# Patient Record
Sex: MALE | Race: WHITE | NOT HISPANIC OR LATINO | Employment: OTHER | URBAN - METROPOLITAN AREA
[De-identification: names, ages, dates, MRNs, and addresses within clinical notes are randomized per-mention and may not be internally consistent; named-entity substitution may affect disease eponyms.]

---

## 2017-03-20 ENCOUNTER — HOSPITAL ENCOUNTER (OUTPATIENT)
Dept: RADIOLOGY | Facility: MEDICAL CENTER | Age: 47
End: 2017-03-20
Attending: NURSE PRACTITIONER
Payer: COMMERCIAL

## 2017-03-20 DIAGNOSIS — Z80.51 FAMILY HISTORY OF MALIGNANT NEOPLASM OF KIDNEY: ICD-10-CM

## 2017-03-20 DIAGNOSIS — R31.29 MICROSCOPIC HEMATURIA: ICD-10-CM

## 2017-03-20 PROCEDURE — 76775 US EXAM ABDO BACK WALL LIM: CPT

## 2017-05-15 ENCOUNTER — HOSPITAL ENCOUNTER (OUTPATIENT)
Dept: RADIOLOGY | Facility: MEDICAL CENTER | Age: 47
End: 2017-05-15
Attending: NURSE PRACTITIONER
Payer: COMMERCIAL

## 2017-05-15 DIAGNOSIS — K76.0 FATTY METAMORPHOSIS OF LIVER: ICD-10-CM

## 2017-05-15 PROCEDURE — 76705 ECHO EXAM OF ABDOMEN: CPT

## 2017-05-22 ENCOUNTER — OFFICE VISIT (OUTPATIENT)
Dept: PULMONOLOGY | Facility: HOSPICE | Age: 47
End: 2017-05-22
Payer: COMMERCIAL

## 2017-05-22 VITALS
HEART RATE: 71 BPM | BODY MASS INDEX: 34.87 KG/M2 | SYSTOLIC BLOOD PRESSURE: 122 MMHG | RESPIRATION RATE: 16 BRPM | OXYGEN SATURATION: 96 % | DIASTOLIC BLOOD PRESSURE: 86 MMHG | HEIGHT: 66 IN | WEIGHT: 217 LBS | TEMPERATURE: 98.8 F

## 2017-05-22 DIAGNOSIS — I10 ESSENTIAL HYPERTENSION: ICD-10-CM

## 2017-05-22 DIAGNOSIS — J45.20 MILD INTERMITTENT ASTHMA WITHOUT COMPLICATION: ICD-10-CM

## 2017-05-22 DIAGNOSIS — G47.33 OSA (OBSTRUCTIVE SLEEP APNEA): ICD-10-CM

## 2017-05-22 PROCEDURE — 99214 OFFICE O/P EST MOD 30 MIN: CPT | Performed by: NURSE PRACTITIONER

## 2017-05-22 NOTE — PROGRESS NOTES
Chief Complaint   Patient presents with   • Apnea   • Asthma       HPI:  Zuhair Jj is a 46 y.o. year old male here today for follow-up on his obstructive sleep apnea and mild intermittent Asthma. His prior PSG indicated an AHI of 15.5 with a low oxygen saturation. He has been compliant on CPAP of 12 CM H20. Compliance card download indicates an average use of 5-6 hours at night. However, his machine does not estimate AHI capability. Overnight oximetry 1/10/2017 on his current pressures indicate a mean 02 saturation of 96.6%. He did have 1 episode of desaturation. However, it appears his mask may have been off at that time. He tolerates the CPAP pressure well. He sleeps better on therapy and wakes more refreshed. He denies any morning headaches. He has a full face mask which is comfortable.    He has a history of mild Asthma and seasonal Allergies. He had been on Qvar in the past.  However, he is off all inhalers at this time. PFT's at his last office visit 11/17/2016 indicated an FEV1 of 3.17 L, 89% predicted with an FEV1/FVC ratio of 81 with a DLCO of 139% predicted. PFT's in August 2015 indicated an FEV1 of 3.45 L, 95% predicted with an FEV1/FVC ratio of 83 and a DLCO of 145% predicted. He denies current dyspnea. He notes an occasional cough. He feels this is often related to allergies and post nasal drip. He denies current mucous production. He notes a seldom wheeze usually with exercise. He denies any fevers or chills. He denies any recent respiratory infections. He has an Albuterol inhaler, but has not required use of it months.       Past Medical History   Diagnosis Date   • Hypertension    • Asthma    • Sleep apnea    • Chest pain    • Overweight        History reviewed. No pertinent past surgical history.    Family History   Problem Relation Age of Onset   • Cancer Father    • Heart Failure Mother        Social History     Social History   • Marital Status:      Spouse Name: N/A   • Number of  "Children: N/A   • Years of Education: N/A     Occupational History   • Not on file.     Social History Main Topics   • Smoking status: Not on file   • Smokeless tobacco: Not on file   • Alcohol Use: Not on file   • Drug Use: Not on file   • Sexual Activity: Not on file     Other Topics Concern   • Not on file     Social History Narrative         ROS:  Constitutional: Denies fevers, chills, sweats, fatigue, weight loss  Eyes: Denies vision loss, pain, drainage, double vision. Wears glasses  Ears/Nose/Mouth/Throat: Denies ear ache, difficulty hearing, sore throat, persistent hoarseness, decayed teeth/toothache. Positive rhinitis   Cardiovascular: Denies chest pain, tightness, palpitations, swelling in feet/legs, fainting, difficulty breathing when laying down  Respiratory: See HPI   GI: Denies heartburn, difficulty swallowing, nausea, vomiting, abdominal pain, diarrhea, constipation  : Denies frequent urination, painful urination  Integumentary: Denies rashes, lumps or color changes  MSK: Denies painful joints, sore muscles, and back pain.   Neurological: Denies frequent headaches, dizziness, weakness  Sleep: See HPI       Current Outpatient Prescriptions on File Prior to Visit   Medication Sig Dispense Refill   • lisinopril-hydrochlorothiazide (PRINZIDE, ZESTORETIC) 20-25 MG per tablet      • metformin ER (GLUCOPHAGE XR) 750 MG TABLET SR 24 HR        No current facility-administered medications on file prior to visit.     Pcn    Blood pressure 122/86, pulse 71, temperature 37.1 °C (98.8 °F), resp. rate 16, height 1.676 m (5' 5.98\"), weight 98.431 kg (217 lb), SpO2 96 %.  PE:   Appearance: Well developed, well nourished, no acute distress  Eyes: PERRL, EOM intact, sclera white, conjunctiva moist  Ears: no lesions or deformities  Hearing: grossly intact  Nose: no lesions or deformities  Oropharynx: tongue normal, posterior pharynx without erythema or exudate  Mallampati Classification: class 4   Neck: supple, trachea " midline, no masses   Respiratory effort: no intercostal retractions or use of accessory muscles  Lung auscultation: no rales, rhonchi or wheezes  Heart auscultation: no murmur rub or gallop  Extremities: no cyanosis or edema  Abdomen: soft ,non tender, no masses  Gait and Station: normal  Digits and nails: no clubbing, cyanosis, petechiae or nodes.  Cranial nerves: grossly intact  Skin: no rashes, lesions or ulcers noted  Orientation: Oriented to time, person and place  Mood and affect: mood and affect appropriate, normal interaction with examiner  Judgement: Intact          Assessment:  1. MAGDALENA (obstructive sleep apnea)     2. Mild intermittent asthma without complication     3. Essential hypertension           Plan:    1) Continue CPAP at 12 CM H20. Order for new machine with AHI capability if eligible.   2) Sleep hygiene discussed.  3) Continue Albuterol HFA inhaler as needed. Stepwise treatment approach to Asthma discussed. Currently his symptoms are stable. He will remain off Qvar.   4) Encouraged routine walking/exercise.  5) Recommend updated Influenza vaccine in the Fall.   6) Follow up in 6 months, sooner if needed.

## 2017-05-22 NOTE — MR AVS SNAPSHOT
"        Zuhair Jj   2017 11:20 AM   Office Visit   MRN: [id number]    Department:  [facility]   Dept Phone:  [phone]    Description:  Male : 1970   Provider:  KENDALL Wright           Reason for Visit     Apnea     Asthma           Allergies as of 2017     Allergen Noted Reactions    Pcn [Penicillins] 2016         You were diagnosed with     MAGDALENA (obstructive sleep apnea)   [964068]       Mild intermittent asthma without complication   [653329]       Essential hypertension   [4274131]         Vital Signs     Blood Pressure Pulse Temperature Respirations Height Weight    122/86 mmHg 71 37.1 °C (98.8 °F) 16 1.676 m (5' 5.98\") 98.431 kg (217 lb)    Body Mass Index Oxygen Saturation                35.04 kg/m2 96%          Basic Information     Date Of Birth Sex Race Ethnicity Preferred Language    1970 Male White Unknown English      Your appointments     2017  1:20 PM   Established Patient Pul with KENDALL Wright   [facility] (--)    [address] [facility] [address]  [facility] [id number] [phone]              Problem List              ICD-10-CM Priority Class Noted - Resolved    Mild intermittent asthma without complication J45.20   10/6/2016 - Present    MAGDALENA (obstructive sleep apnea) G47.33   10/6/2016 - Present    Essential hypertension I10   10/6/2016 - Present      Health Maintenance        Date Due Completion Dates    IMM DTaP/Tdap/Td Vaccine (1 - Tdap) 1987 ---    IMM PNEUMOCOCCAL 19-64 (ADULT) MEDIUM RISK SERIES (1 of 1 - PPSV23) 1987 ---            Current Immunizations     Influenza TIV (IM) 10/2/2013    Influenza Vaccine Quad Inj (Preserved) 10/6/2016  3:32 PM      Below and/or attached are the medications your provider expects you to take. Review all of your home medications and newly ordered medications with your provider and/or pharmacist. Follow medication instructions as directed by your " provider and/or pharmacist. Please keep your medication list with you and share with your provider. Update the information when medications are discontinued, doses are changed, or new medications (including over-the-counter products) are added; and carry medication information at all times in the event of emergency situations     Allergies:  PCN - (reactions not documented)               Medications  Valid as of: May 22, 2017 - 12:16 PM    Generic Name Brand Name Tablet Size Instructions for use    Lisinopril-Hydrochlorothiazide (Tab) PRINZIDE, ZESTORETIC 20-25 MG         MetFORMIN HCl (TABLET SR 24 HR) GLUCOPHAGE  MG         .                 Medicines prescribed today were sent to:     [facility][id number] - [place], [place] - [address]    [address] [place] [address]    Phone: [phone] Fax: [phone]    Open 24 Hours?: No      Medication refill instructions:       If your prescription bottle indicates you have medication refills left, it is not necessary to call your provider’s office. Please contact your pharmacy and they will refill your medication.    If your prescription bottle indicates you do not have any refills left, you may request refills at any time through one of the following ways: The online [employer] system (except Urgent Care), by calling your provider’s office, or by asking your pharmacy to contact your provider’s office with a refill request. Medication refills are processed only during regular business hours and may not be available until the next business day. Your provider may request additional information or to have a follow-up visit with you prior to refilling your medication.   *Please Note: Medication refills are assigned a new Rx number when refilled electronically. Your pharmacy may indicate that no refills were authorized even though a new prescription for the same medication is available at the pharmacy. Please request the medicine by name with the pharmacy before  contacting your provider for a refill.           MegaPath Access Code: [email or web address]  Expires: 6/15/2017  4:36 PM    MegaPath  A secure, online tool to manage your health information     [employer]’s MegaPath® is a secure, online tool that connects you to your personalized health information from the privacy of your home -- day or night - making it very easy for you to manage your healthcare. Once the activation process is completed, you can even access your medical information using the MegaPath maile, which is available for free in the Apple Maile store or Google Play store.     MegaPath provides the following levels of access (as shown below):   My Chart Features   [facility] Primary Care Doctor [facility]  Specialists [facility]  Urgent  Care Non-[facility]  Primary Care  Doctor   Email your healthcare team securely and privately 24/7 X X X    Manage appointments: schedule your next appointment; view details of past/upcoming appointments X      Request prescription refills. X      View recent personal medical records, including lab and immunizations X X X X   View health record, including health history, allergies, medications X X X X   Read reports about your outpatient visits, procedures, consult and ER notes X X X X   See your discharge summary, which is a recap of your hospital and/or ER visit that includes your diagnosis, lab results, and care plan. X X       How to register for MegaPath:  1. Go to  [email or web address].  2. Click on the Sign Up Now box, which takes you to the New Member Sign Up page. You will need to provide the following information:  a. Enter your MegaPath Access Code exactly as it appears at the top of this page. (You will not need to use this code after you’ve completed the sign-up process. If you do not sign up before the expiration date, you must request a new code.)   b. Enter your date of birth.   c. Enter your home email address.   d. Click Submit, and follow the next screen’s  instructions.  3. Create a [employer] ID. This will be your [employer] login ID and cannot be changed, so think of one that is secure and easy to remember.  4. Create a [employer] password. You can change your password at any time.  5. Enter your Password Reset Question and Answer. This can be used at a later time if you forget your password.   6. Enter your e-mail address. This allows you to receive e-mail notifications when new information is available in [employer].  7. Click Sign Up. You can now view your health information.    For assistance activating your [employer] account, call [phone]

## 2017-05-22 NOTE — PATIENT INSTRUCTIONS
Plan:    1) Continue CPAP at 12 CM H20. Order for new machine with AHI capability if eligible.   2) Sleep hygiene discussed.  3) Continue Albuterol HFA inhaler as needed. Stepwise treatment approach to Asthma discussed. Currently his symptoms are stable. He will remain off Qvar.   4) Encouraged routine walking/exercise.  5) Recommend updated Influenza vaccine in the Fall.   6) Follow up in 6 months, sooner if needed.

## 2017-06-22 ENCOUNTER — TELEPHONE (OUTPATIENT)
Dept: PULMONOLOGY | Facility: HOSPICE | Age: 47
End: 2017-06-22

## 2017-09-30 RX ORDER — AZITHROMYCIN 250 MG/1
TABLET, FILM COATED ORAL
Qty: 6 TAB | Refills: 0 | Status: SHIPPED | OUTPATIENT
Start: 2017-09-30 | End: 2017-11-20

## 2017-09-30 NOTE — TELEPHONE ENCOUNTER
Pt called me this morning c/o cough productive of yellow sputum. Had similar episode before responded well to zithromax. Denies any fever or CP.     Will prescribe zpack today     He will call the office on Monday if cont to feel sick

## 2017-10-09 ENCOUNTER — OFFICE VISIT (OUTPATIENT)
Dept: PULMONOLOGY | Facility: HOSPICE | Age: 47
End: 2017-10-09
Payer: COMMERCIAL

## 2017-10-09 VITALS
OXYGEN SATURATION: 98 % | HEART RATE: 68 BPM | DIASTOLIC BLOOD PRESSURE: 84 MMHG | BODY MASS INDEX: 34.62 KG/M2 | WEIGHT: 220.6 LBS | RESPIRATION RATE: 16 BRPM | SYSTOLIC BLOOD PRESSURE: 126 MMHG | HEIGHT: 67 IN

## 2017-10-09 DIAGNOSIS — J01.90 ACUTE SINUSITIS, RECURRENCE NOT SPECIFIED, UNSPECIFIED LOCATION: ICD-10-CM

## 2017-10-09 DIAGNOSIS — G47.33 OSA (OBSTRUCTIVE SLEEP APNEA): ICD-10-CM

## 2017-10-09 DIAGNOSIS — I10 ESSENTIAL HYPERTENSION: ICD-10-CM

## 2017-10-09 DIAGNOSIS — J45.20 MILD INTERMITTENT ASTHMA WITHOUT COMPLICATION: ICD-10-CM

## 2017-10-09 PROCEDURE — 99214 OFFICE O/P EST MOD 30 MIN: CPT | Performed by: NURSE PRACTITIONER

## 2017-10-09 RX ORDER — METHYLPREDNISOLONE 4 MG/1
TABLET ORAL
Qty: 21 TAB | Refills: 0 | Status: SHIPPED | OUTPATIENT
Start: 2017-10-09 | End: 2017-11-20

## 2017-10-09 RX ORDER — SULFAMETHOXAZOLE AND TRIMETHOPRIM 800; 160 MG/1; MG/1
1 TABLET ORAL 2 TIMES DAILY
Qty: 20 TAB | Refills: 0 | Status: SHIPPED | OUTPATIENT
Start: 2017-10-09 | End: 2017-11-20

## 2017-10-09 NOTE — PROGRESS NOTES
Chief Complaint   Patient presents with   • Apnea     12 CM H2O   • Asthma       HPI:  Zuhair Jj is a 47 y.o. year old male here today for follow-up on his obstructive sleep apnea and mild intermittent Asthma. His prior PSG indicated an AHI of 15.5 with a low oxygen saturation. He has been compliant on CPAP of 12 CM H20. Prior compliance card download indicates an average use of 5-6 hours at night. His old machine did not have AHI capability. He was ordered a new machine at his last office visit with AHI capability. However, he has only had his machine for 2 weeks and did not bring his chip to today's office visit. Overnight oximetry 1/10/2017 on his current pressures indicate a mean 02 saturation of 96.6%. He did have 1 episode of desaturation. However, it appears his mask may have been off at that time. He tolerates the CPAP pressure well. He sleeps better on therapy and wakes more refreshed. He denies any morning headaches. He has a full face mask which is comfortable.   He has a history of mild Asthma and seasonal Allergies. He had been on Qvar in the past.  However, he is off all inhalers at this time. PFT's 11/17/2016 indicated an FEV1 of 3.17 L, 89% predicted with an FEV1/FVC ratio of 81 with a DLCO of 139% predicted. PFT's in August 2015 indicated an FEV1 of 3.45 L, 95% predicted with an FEV1/FVC ratio of 83 and a DLCO of 145% predicted.   He called into the office on 9/30/2017 with complaints of an increased cough. He was prescribed a Zpak which he completed. He is feeling better overall. However, he continues to cough and produce thick yellow mucous. He did have streaks of blood in his mucous yesterday which he feels is coming from his sinuses. He feels he may have a sinus infection. He has sinus pressure and purulent sinus drainage. He has had an increased wheeze. He denies any fevers or chills. He has had a mild increase in dyspnea as well. He did not tolerate saline irrigation in the past.        Past Medical History:   Diagnosis Date   • Asthma    • Chest pain    • Hypertension    • Overweight    • Sleep apnea        No past surgical history on file.    Family History   Problem Relation Age of Onset   • Cancer Father    • Heart Failure Mother        Social History     Social History   • Marital status:      Spouse name: N/A   • Number of children: N/A   • Years of education: N/A     Occupational History   • Not on file.     Social History Main Topics   • Smoking status: Never Smoker   • Smokeless tobacco: Never Used   • Alcohol use No   • Drug use: No   • Sexual activity: Not on file     Other Topics Concern   • Not on file     Social History Narrative   • No narrative on file         ROS:  Constitutional: Denies fevers, chills, sweats, fatigue, weight loss  Eyes: Denies vision loss, pain, drainage, double vision. Wears glasses   Ears/Nose/Mouth/Throat: Denies ear ache, difficulty hearing, sore throat, persistent hoarseness, decayed teeth/toothache  Cardiovascular: Denies chest pain, tightness, palpitations, swelling in feet/legs, fainting, difficulty breathing when laying down  Respiratory: See HPI   GI: Denies heartburn, difficulty swallowing, nausea, vomiting, abdominal pain, diarrhea, constipation  : Denies frequent urination, painful urination  Integumentary: Denies rashes, lumps or color changes  MSK: Denies painful joints, sore muscles, and back pain.   Neurological: Denies frequent headaches, dizziness, weakness  Sleep: See HPI       Current Outpatient Prescriptions   Medication Sig Dispense Refill   • lisinopril-hydrochlorothiazide (PRINZIDE, ZESTORETIC) 20-25 MG per tablet      • metformin ER (GLUCOPHAGE XR) 750 MG TABLET SR 24 HR      • azithromycin (ZITHROMAX) 250 MG Tab Take 2 tablets on day 1, then take 1 tablet a day for 4 days. 6 Tab 0     No current facility-administered medications for this visit.        Allergies   Allergen Reactions   • Pcn [Penicillins]        Blood  "pressure 126/84, pulse 68, resp. rate 16, height 1.702 m (5' 7\"), weight 100.1 kg (220 lb 9.6 oz), SpO2 98 %.    PE:   Appearance: Well developed, well nourished, no acute distress  Eyes: PERRL, EOM intact, sclera white, conjunctiva moist  Ears: no lesions or deformities  Hearing: grossly intact  Nose: no lesions or deformities  Oropharynx: tongue normal, posterior pharynx without erythema or exudate  Mallampati Classification: class 4  Neck: supple, trachea midline, no masses   Respiratory effort: no intercostal retractions or use of accessory muscles  Lung auscultation: faint scattered expiratory wheeze  Heart auscultation: no murmur rub or gallop  Extremities: no cyanosis or edema  Abdomen: soft ,non tender, no masses  Gait and Station: normal  Digits and nails: no clubbing, cyanosis, petechiae or nodes.  Cranial nerves: grossly intact  Skin: no rashes, lesions or ulcers noted  Orientation: Oriented to time, person and place  Mood and affect: mood and affect appropriate, normal interaction with examiner  Judgement: Intact          Assessment:  1. MAGDALENA (obstructive sleep apnea)     2. Mild intermittent asthma without complication     3. Essential hypertension     4. BMI 34.0-34.9,adult     5. Acute sinusitis, recurrence not specified, unspecified location  sulfamethoxazole-trimethoprim (BACTRIM DS) 800-160 MG tablet    MethylPREDNISolone (MEDROL DOSEPAK) 4 MG Tablet Therapy Pack         Plan:    1) He is PCN allergic. RX for Bactrim DS 1 po bid x 10 days along with a Medrol dosepack. He did not tolerate saline irrigation in the past.   2) Continue CPAP at 12 CM H20. Request compliance download from his DME. Encouraged to bring his compliance chip to his follow up visit.   3) Sleep hygiene discussed. Weight loss recommended.  4) Encouraged routine walking/exercise.  5) He is up to date on Pneumovax 23 vaccination. He would like to hold off on receiving his Influenza vaccine until he is feeling better.  6) Follow up " in 6 weeks, sooner if needed. He is going to bring in [facility] paperwork to be completed.

## 2017-10-09 NOTE — PATIENT INSTRUCTIONS
Plan:    1) He is PCN allergic. RX for Bactrim DS 1 po bid x 10 days along with a Medrol dosepack. He did not tolerate saline irrigation in the past.   2) Continue CPAP at 12 CM H20. Request compliance download from his DME. Encouraged to bring his compliance chip to his follow up visit.   3) Sleep hygiene discussed. Weight loss recommended.  4) Encouraged routine walking/exercise.  5) He is up to date on Pneumovax 23 vaccination. He would like to hold off on receiving his Influenza vaccine until he is feeling better.  6) Follow up in 6 weeks, sooner if needed. He is going to bring in [facility] paperwork to be completed.

## 2017-10-23 ENCOUNTER — TELEPHONE (OUTPATIENT)
Dept: PULMONOLOGY | Facility: HOSPICE | Age: 47
End: 2017-10-23

## 2017-10-23 NOTE — TELEPHONE ENCOUNTER
1. Caller Name: pt                      Call Back Number: [phone] (home)     2. Message: pt was seen on 10/09 by Claudia Hernandez to discuss [place] paperwork that he needed filled out. He dropped off paperwork today, along with DMV paperwork. Please contact pt when completed. Forms are at     3. Patient approves office to leave a detailed voicemail/MyChart message: N\A

## 2017-11-02 NOTE — TELEPHONE ENCOUNTER
I called amd left a message for Zuhair to call me back in regards to his DMV placard and FMLA paperwork.

## 2017-11-03 NOTE — TELEPHONE ENCOUNTER
Patient called back and I clarified the frequency of his flairs with him. I told him we would call and let him know when his papers are completed.

## 2017-11-06 ENCOUNTER — TELEPHONE (OUTPATIENT)
Dept: PULMONOLOGY | Facility: HOSPICE | Age: 47
End: 2017-11-06

## 2017-11-06 NOTE — TELEPHONE ENCOUNTER
I spoke to patient's spouse who is listed as emergency contact and left a message with her letter Zuhair know that all of his paperwork has been faxed.    Faxed [facility] paperwork to [employer] [place] per patient's request.  Phone: [phone]  Fax: [phone]  RECEIVED CONFIRMATION      Faxed DMV placard form to fax number on form.  Fax: [phone]  CONFIRMATION RECEIVED    All paperwork scanned into patient's chart.

## 2017-11-20 ENCOUNTER — OFFICE VISIT (OUTPATIENT)
Dept: PULMONOLOGY | Facility: HOSPICE | Age: 47
End: 2017-11-20
Payer: COMMERCIAL

## 2017-11-20 VITALS
DIASTOLIC BLOOD PRESSURE: 78 MMHG | OXYGEN SATURATION: 95 % | HEART RATE: 61 BPM | WEIGHT: 219 LBS | SYSTOLIC BLOOD PRESSURE: 120 MMHG | RESPIRATION RATE: 16 BRPM | BODY MASS INDEX: 34.37 KG/M2 | HEIGHT: 67 IN

## 2017-11-20 DIAGNOSIS — G47.33 OSA (OBSTRUCTIVE SLEEP APNEA): ICD-10-CM

## 2017-11-20 DIAGNOSIS — I10 ESSENTIAL HYPERTENSION: ICD-10-CM

## 2017-11-20 DIAGNOSIS — J45.20 MILD INTERMITTENT ASTHMA WITHOUT COMPLICATION: ICD-10-CM

## 2017-11-20 PROCEDURE — 99214 OFFICE O/P EST MOD 30 MIN: CPT | Performed by: NURSE PRACTITIONER

## 2017-11-20 RX ORDER — METHYLPREDNISOLONE 4 MG/1
TABLET ORAL
Qty: 21 TAB | Refills: 1 | Status: SHIPPED | OUTPATIENT
Start: 2017-11-20 | End: 2018-10-10

## 2017-11-20 RX ORDER — AZITHROMYCIN 250 MG/1
TABLET, FILM COATED ORAL
Qty: 6 TAB | Refills: 1 | Status: SHIPPED | OUTPATIENT
Start: 2017-11-20 | End: 2018-06-05

## 2017-11-21 NOTE — PROGRESS NOTES
Chief Complaint   Patient presents with   • Apnea     12 CM H2O   • Asthma       HPI:  Zuhair Jj is a 47 y.o. year old male here today for follow-up on his obstructive sleep apnea and mild intermittent Asthma. His prior PSG indicated an AHI of 15.5 with a low oxygen saturation. He has been compliant on CPAP of 12 CM H20. Prior compliance card download indicates an average use of 5-6 hours at night. His old machine did not have AHI capability. He was ordered a new machine with AHI capability. Overnight oximetry 1/10/2017 on his current pressures indicate a mean 02 saturation of 96.6%. He did have 1 episode of desaturation. However, it appears his mask may have been off at that time. He tolerates the CPAP pressure well. He sleeps better on therapy and wakes more refreshed. He denies any morning headaches. He has a full face mask which is comfortable. His compliance card download today in the office indicates an AHI of 0.9 with an average use of 6.5 hours at night.   He has a history of mild Asthma and seasonal Allergies. He had been on Qvar in the past.  However, he is off all inhalers at this time. PFT's 11/17/2016 indicated an FEV1 of 3.17 L, 89% predicted with an FEV1/FVC ratio of 81 with a DLCO of 139% predicted. PFT's in August 2015 indicated an FEV1 of 3.45 L, 95% predicted with an FEV1/FVC ratio of 83 and a DLCO of 145% predicted.   He did have an episode of sinusitis in September that did not improve with Azithromycin. He is allergic to PCN. He was switched to Bactrim DS along with a Medrol dosepack 10/9/2017 which he completed. He is feeling better now. He denies purulent sinus drainage. He notes mild dyspnea which he feels is related to allergy triggers. He denies current wheezing. He denies any fevers or chills. He does have an Albuterol inhaler, but has not required use of it.       Past Medical History:   Diagnosis Date   • Asthma    • Chest pain    • Hypertension    • Overweight    • Sleep apnea         History reviewed. No pertinent surgical history.    Family History   Problem Relation Age of Onset   • Cancer Father    • Heart Failure Mother        Social History     Social History   • Marital status:      Spouse name: N/A   • Number of children: N/A   • Years of education: N/A     Occupational History   • Not on file.     Social History Main Topics   • Smoking status: Never Smoker   • Smokeless tobacco: Never Used   • Alcohol use No   • Drug use: No   • Sexual activity: Not on file     Other Topics Concern   • Not on file     Social History Narrative   • No narrative on file       ROS:  Constitutional: Denies fevers, chills, sweats, fatigue, weight loss  Eyes: Denies vision loss, pain, drainage, double vision. Wears glasses   Ears/Nose/Mouth/Throat: Denies ear ache, difficulty hearing, sore throat, persistent hoarseness, decayed teeth/toothache  Cardiovascular: Denies chest pain, tightness, palpitations, swelling in feet/legs, fainting, difficulty breathing when laying down  Respiratory: See HPI   GI: Denies heartburn, difficulty swallowing, nausea, vomiting, abdominal pain, diarrhea, constipation  : Denies frequent urination, painful urination  Integumentary: Denies rashes, lumps or color changes  MSK: Denies painful joints, sore muscles, and back pain.   Neurological: Denies frequent headaches, dizziness, weakness  Sleep: See HPI     Current Outpatient Prescriptions   Medication Sig Dispense Refill   • lisinopril-hydrochlorothiazide (PRINZIDE, ZESTORETIC) 20-25 MG per tablet      • metformin ER (GLUCOPHAGE XR) 750 MG TABLET SR 24 HR      • sulfamethoxazole-trimethoprim (BACTRIM DS) 800-160 MG tablet Take 1 Tab by mouth 2 times a day. Take until gone. 20 Tab 0   • MethylPREDNISolone (MEDROL DOSEPAK) 4 MG Tablet Therapy Pack Take as directed. 21 Tab 0   • azithromycin (ZITHROMAX) 250 MG Tab Take 2 tablets on day 1, then take 1 tablet a day for 4 days. 6 Tab 0     No current facility-administered  "medications for this visit.        Allergies   Allergen Reactions   • Pcn [Penicillins]        Blood pressure 120/78, pulse 61, resp. rate 16, height 1.702 m (5' 7\"), weight 99.3 kg (219 lb), SpO2 95 %.    PE:   Appearance: Well developed, well nourished, no acute distress  Eyes: PERRL, EOM intact, sclera white, conjunctiva moist  Ears: no lesions or deformities  Hearing: grossly intact  Nose: no lesions or deformities  Oropharynx: tongue normal, posterior pharynx without erythema or exudate  Mallampati Classification: class 4  Neck: supple, trachea midline, no masses   Respiratory effort: no intercostal retractions or use of accessory muscles  Lung auscultation: no rales, rhonchi or wheezes  Heart auscultation: no murmur rub or gallop  Extremities: no cyanosis or edema  Abdomen: soft ,non tender, no masses  Gait and Station: normal  Digits and nails: no clubbing, cyanosis, petechiae or nodes.  Cranial nerves: grossly intact  Skin: no rashes, lesions or ulcers noted  Orientation: Oriented to time, person and place  Mood and affect: mood and affect appropriate, normal interaction with examiner  Judgement: Intact          Assessment:  1. MAGDALENA (obstructive sleep apnea)     2. Mild intermittent asthma without complication  AMB SPIROMETRY    azithromycin (ZITHROMAX) 250 MG Tab    MethylPREDNISolone (MEDROL DOSEPAK) 4 MG Tablet Therapy Pack   3. Essential hypertension     4. BMI 34.0-34.9,adult           Plan:    1) Continue CPAP at 12 CM H20.   2) Sleep hygiene discussed. Weight loss recommended.  3) Encouraged routine walking/exercise.   4) He is up to date on Pneumovax 23 and Influenza vaccines.  5) He is off inhalers, but does have an Albuterol and Qvar inhaler on hand. We discussed stepwise treatment approach to Asthma.   6) 6 month follow up with updated Spirometry, sooner if needed. RX for Zpak and Medrol dosepack sent to local pharmacy to have on hand.   "

## 2017-11-21 NOTE — PATIENT INSTRUCTIONS
Plan:    1) Continue CPAP at 12 CM H20.   2) Sleep hygiene discussed. Weight loss recommended.  3) Encouraged routine walking/exercise.   4) He is up to date on Pneumovax 23 and Influenza vaccines.  5) He is off inhalers, but does have an Albuterol and Qvar inhaler on hand. We discussed stepwise treatment approach to Asthma.   6) 6 month follow up with updated Spirometry, sooner if needed. RX for Zpak and Medrol dosepack sent to local pharmacy to have on hand.

## 2018-06-05 ENCOUNTER — OFFICE VISIT (OUTPATIENT)
Dept: MEDICAL GROUP | Facility: CLINIC | Age: 48
End: 2018-06-05
Payer: COMMERCIAL

## 2018-06-05 VITALS
DIASTOLIC BLOOD PRESSURE: 70 MMHG | HEIGHT: 67 IN | OXYGEN SATURATION: 95 % | SYSTOLIC BLOOD PRESSURE: 136 MMHG | RESPIRATION RATE: 16 BRPM | WEIGHT: 212 LBS | TEMPERATURE: 99.1 F | BODY MASS INDEX: 33.27 KG/M2 | HEART RATE: 69 BPM

## 2018-06-05 DIAGNOSIS — J45.20 MILD INTERMITTENT ASTHMA WITHOUT COMPLICATION: ICD-10-CM

## 2018-06-05 DIAGNOSIS — E66.9 OBESITY (BMI 30-39.9): ICD-10-CM

## 2018-06-05 DIAGNOSIS — I49.3 FREQUENT UNIFOCAL PVCS: ICD-10-CM

## 2018-06-05 DIAGNOSIS — I10 ESSENTIAL HYPERTENSION: ICD-10-CM

## 2018-06-05 DIAGNOSIS — Z13.6 SCREENING FOR CARDIOVASCULAR CONDITION: ICD-10-CM

## 2018-06-05 DIAGNOSIS — Z00.00 ENCOUNTER FOR MEDICAL EXAMINATION TO ESTABLISH CARE: ICD-10-CM

## 2018-06-05 DIAGNOSIS — R79.89 ELEVATED CORTISOL LEVEL: ICD-10-CM

## 2018-06-05 DIAGNOSIS — Z12.11 SPECIAL SCREENING FOR MALIGNANT NEOPLASM OF COLON: ICD-10-CM

## 2018-06-05 DIAGNOSIS — G47.33 OSA (OBSTRUCTIVE SLEEP APNEA): ICD-10-CM

## 2018-06-05 DIAGNOSIS — F55.1: ICD-10-CM

## 2018-06-05 PROBLEM — I49.9 CARDIAC ARRHYTHMIA: Status: RESOLVED | Noted: 2018-06-05 | Resolved: 2018-06-05

## 2018-06-05 PROBLEM — I49.9 CARDIAC ARRHYTHMIA: Status: ACTIVE | Noted: 2018-06-05

## 2018-06-05 PROCEDURE — 99214 OFFICE O/P EST MOD 30 MIN: CPT | Performed by: PHYSICIAN ASSISTANT

## 2018-06-05 RX ORDER — DULAGLUTIDE 1.5 MG/.5ML
INJECTION, SOLUTION SUBCUTANEOUS
COMMUNITY
Start: 2018-03-13 | End: 2018-06-05

## 2018-06-05 RX ORDER — CHLORHEXIDINE GLUCONATE ORAL RINSE 1.2 MG/ML
SOLUTION DENTAL
COMMUNITY
Start: 2018-04-23 | End: 2018-06-05

## 2018-06-05 ASSESSMENT — PATIENT HEALTH QUESTIONNAIRE - PHQ9: CLINICAL INTERPRETATION OF PHQ2 SCORE: 0
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
## 2018-06-05 NOTE — ASSESSMENT & PLAN NOTE
This is a chronic problem, new to this provider.  This patient's blood pressure is well controlled on lisinopril-hydrochlorothiazide 20-25 mg per day.  His blood pressure is 136/70 with a pulse of 69 today.  He denies chest pain or shortness of breath or headaches at this time.

## 2018-06-05 NOTE — ASSESSMENT & PLAN NOTE
This patient cannot recall when however, he believes he had an elevated cortisol level at one point in time.  He states he is a  and is afraid that elevated cortisol levels will result in a heart attack.  He is requesting lab testing at this time.  He does take methylprednisone 2-3 times annually.

## 2018-06-05 NOTE — ASSESSMENT & PLAN NOTE
This patient uses a litany of herbal medications to treat his various ailments without regard to potential side effects or interactions. He and his wife frequently visit a shaman who prescribes these herbs to him. They also do a lot of online research from unknown sources to determine their decisions regarding which herbal medications to use and when.
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
## 2018-06-05 NOTE — ASSESSMENT & PLAN NOTE
Patient's current BMI is 33.2.  He is working to lose weight, having recently changed his diet but without increasing his daily exercise.

## 2018-06-05 NOTE — ASSESSMENT & PLAN NOTE
This patient's previous primary care provider recently changed locations and is no longer seeing this patient.  Thus he wishes to establish care with a new provider at this time.

## 2018-06-05 NOTE — ASSESSMENT & PLAN NOTE
Discovered on EKG today, this patient states that he has a history of this especially in relation to stressful events. He denies chest pain, tightness, or shortness of breath today. See his attached EKG.

## 2018-06-13 DIAGNOSIS — J06.9 UPPER RESPIRATORY TRACT INFECTION, UNSPECIFIED TYPE: ICD-10-CM

## 2018-06-13 RX ORDER — METHYLPREDNISOLONE 4 MG/1
TABLET ORAL
Qty: 21 TAB | Refills: 0 | Status: SHIPPED | OUTPATIENT
Start: 2018-06-13 | End: 2018-10-10

## 2018-06-13 NOTE — TELEPHONE ENCOUNTER
Have we ever prescribed this med? Yes.  If yes, what date? 11/20/17    Last OV: 11/20/17    Next OV: 10/10/08    DX: Mild intermittent asthma without complication (J45.20)    Medications: MethylPREDNISolone (MEDROL DOSEPAK) 4 MG Tablet Therapy Pack      Plan:     1) Continue CPAP at 12 CM H20.   2) Sleep hygiene discussed. Weight loss recommended.  3) Encouraged routine walking/exercise.   4) He is up to date on Pneumovax 23 and Influenza vaccines.  5) He is off inhalers, but does have an Albuterol and Qvar inhaler on hand. We discussed stepwise treatment approach to Asthma.   6) 6 month follow up with updated Spirometry, sooner if needed. RX for Zpak and Medrol dosepack sent to local pharmacy to have on hand

## 2018-06-15 ENCOUNTER — HOSPITAL ENCOUNTER (OUTPATIENT)
Facility: MEDICAL CENTER | Age: 48
End: 2018-06-15
Attending: PHYSICIAN ASSISTANT
Payer: COMMERCIAL

## 2018-06-15 ENCOUNTER — NON-PROVIDER VISIT (OUTPATIENT)
Dept: MEDICAL GROUP | Facility: CLINIC | Age: 48
End: 2018-06-15
Payer: COMMERCIAL

## 2018-06-15 DIAGNOSIS — J45.20 MILD INTERMITTENT ASTHMA WITHOUT COMPLICATION: ICD-10-CM

## 2018-06-15 DIAGNOSIS — R79.89 ELEVATED CORTISOL LEVEL: ICD-10-CM

## 2018-06-15 DIAGNOSIS — Z13.6 SCREENING FOR CARDIOVASCULAR CONDITION: ICD-10-CM

## 2018-06-15 DIAGNOSIS — Z01.89 ROUTINE LAB DRAW: ICD-10-CM

## 2018-06-15 LAB
ALBUMIN SERPL BCP-MCNC: 4.4 G/DL (ref 3.2–4.9)
ALBUMIN/GLOB SERPL: 1.5 G/DL
ALP SERPL-CCNC: 44 U/L (ref 30–99)
ALT SERPL-CCNC: 36 U/L (ref 2–50)
ANION GAP SERPL CALC-SCNC: 8 MMOL/L (ref 0–11.9)
AST SERPL-CCNC: 21 U/L (ref 12–45)
BILIRUB SERPL-MCNC: 0.9 MG/DL (ref 0.1–1.5)
BUN SERPL-MCNC: 14 MG/DL (ref 8–22)
CALCIUM SERPL-MCNC: 9.6 MG/DL (ref 8.5–10.5)
CHLORIDE SERPL-SCNC: 102 MMOL/L (ref 96–112)
CHOLEST SERPL-MCNC: 213 MG/DL (ref 100–199)
CO2 SERPL-SCNC: 24 MMOL/L (ref 20–33)
CORTIS SERPL-MCNC: 9.3 UG/DL (ref 0–23)
CREAT SERPL-MCNC: 0.73 MG/DL (ref 0.5–1.4)
GLOBULIN SER CALC-MCNC: 2.9 G/DL (ref 1.9–3.5)
GLUCOSE SERPL-MCNC: 99 MG/DL (ref 65–99)
HDLC SERPL-MCNC: 76 MG/DL
LDLC SERPL CALC-MCNC: 105 MG/DL
POTASSIUM SERPL-SCNC: 3.8 MMOL/L (ref 3.6–5.5)
PROT SERPL-MCNC: 7.3 G/DL (ref 6–8.2)
SODIUM SERPL-SCNC: 134 MMOL/L (ref 135–145)
TRIGL SERPL-MCNC: 162 MG/DL (ref 0–149)

## 2018-06-15 PROCEDURE — 99000 SPECIMEN HANDLING OFFICE-LAB: CPT | Performed by: NURSE PRACTITIONER

## 2018-06-15 PROCEDURE — 36415 COLL VENOUS BLD VENIPUNCTURE: CPT | Performed by: NURSE PRACTITIONER

## 2018-06-15 PROCEDURE — 80053 COMPREHEN METABOLIC PANEL: CPT

## 2018-06-15 PROCEDURE — 80061 LIPID PANEL: CPT

## 2018-06-15 PROCEDURE — 82533 TOTAL CORTISOL: CPT

## 2018-06-18 ENCOUNTER — TELEMEDICINE2 (OUTPATIENT)
Dept: CARDIOLOGY | Facility: MEDICAL CENTER | Age: 48
End: 2018-06-18
Payer: COMMERCIAL

## 2018-06-18 VITALS
HEIGHT: 67 IN | WEIGHT: 210 LBS | BODY MASS INDEX: 32.96 KG/M2 | SYSTOLIC BLOOD PRESSURE: 122 MMHG | RESPIRATION RATE: 16 BRPM | HEART RATE: 50 BPM | OXYGEN SATURATION: 94 % | DIASTOLIC BLOOD PRESSURE: 78 MMHG | TEMPERATURE: 98.4 F

## 2018-06-18 RX ORDER — ASPIRIN 81 MG/1
81 TABLET ORAL DAILY
COMMUNITY

## 2018-07-02 ENCOUNTER — TELEMEDICINE2 (OUTPATIENT)
Dept: CARDIOLOGY | Facility: MEDICAL CENTER | Age: 48
End: 2018-07-02
Payer: COMMERCIAL

## 2018-07-02 VITALS
BODY MASS INDEX: 33.27 KG/M2 | HEART RATE: 63 BPM | WEIGHT: 212 LBS | SYSTOLIC BLOOD PRESSURE: 126 MMHG | RESPIRATION RATE: 14 BRPM | HEIGHT: 67 IN | DIASTOLIC BLOOD PRESSURE: 84 MMHG | OXYGEN SATURATION: 95 %

## 2018-07-02 DIAGNOSIS — R01.1 HEART MURMUR: ICD-10-CM

## 2018-07-02 DIAGNOSIS — I10 ESSENTIAL HYPERTENSION: ICD-10-CM

## 2018-07-02 DIAGNOSIS — I49.3 PVC'S (PREMATURE VENTRICULAR CONTRACTIONS): ICD-10-CM

## 2018-07-02 PROCEDURE — 99203 OFFICE O/P NEW LOW 30 MIN: CPT | Performed by: INTERNAL MEDICINE

## 2018-07-02 NOTE — PROGRESS NOTES
"Chief Complaint   Patient presents with   • Premature Ventricular Contractions (PVCs)       Subjective:   Zuhair Jj is a 47 y.o. male who presents today via telemedicine for evaluation of frequent PVCs noted on EKG.  Apparently the patient has had PVCs in the past with normal workup about 10 years ago and an unremarkable Holter monitor about a year ago.  He had a treadmill in 2016 when he was under considerable stress.  This study was normal and the patient accomplished 9 minutes of exercise on the Juan protocol and did not have significant ectopics or ischemia by report.  Patient is not aware of sensations of palpitations generally.  Occasionally he can feel his heartbeat but is not disturbed by this.  He has no orthopnea, PND, pedal edema.  He does not have anginal type symptoms of chest pain or pressure.  He has not had stroke or TIA.  Patient is a lifelong non-smoker.  He used to drink 4 5 cups of coffee a day and has reduce that to 1 or 2 per day.  He does not drink alcohol.  He does reportedly have diabetes and has succeeded in losing about 10 pounds the past 6 months by changing his diet and caloric intake.  His recent chemistry panel is noted below with a glucose of 99 and normal renal function.  He would like to lose more weight.  His lipids have improved with 10 pound weight loss although his triglycerides remain somewhat elevated.  See below.    Past Medical History:   Diagnosis Date   • Asthma    • Chest pain    • Hypertension    • Overweight    • Rheumatic fever with cardiac involvement     patient cant recall but thinks there was a \"tear\" in his heart   • Sleep apnea      Past Surgical History:   Procedure Laterality Date   • OTHER      jaw reconstruction     Family History   Problem Relation Age of Onset   • Cancer Father    • Heart Failure Mother    • No Known Problems Child      Social History     Social History   • Marital status:      Spouse name: N/A   • Number of children: N/A   • " "Years of education: N/A     Occupational History   • Not on file.     Social History Main Topics   • Smoking status: Never Smoker   • Smokeless tobacco: Never Used   • Alcohol use No   • Drug use: No   • Sexual activity: Yes     Partners: Female     Other Topics Concern   • Not on file     Social History Narrative   • No narrative on file     Allergies   Allergen Reactions   • Pcn [Penicillins] Anaphylaxis     Outpatient Encounter Prescriptions as of 7/2/2018   Medication Sig Dispense Refill   • aspirin 81 MG EC tablet Take 81 mg by mouth every day at 6 PM.     • MethylPREDNISolone (MEDROL DOSEPAK) 4 MG Tablet Therapy Pack TAKE AS INSTRUCTED - PER PACKAGE INSTRUCTIONS 21 Tab 0   • lisinopril-hydrochlorothiazide (PRINZIDE, ZESTORETIC) 20-25 MG per tablet Take 1 Tab by mouth every day.     • metformin ER (GLUCOPHAGE XR) 750 MG TABLET SR 24 HR Take 750 mg by mouth every day.     • MethylPREDNISolone (MEDROL DOSEPAK) 4 MG Tablet Therapy Pack Take as directed. (Patient not taking: Reported on 6/18/2018) 21 Tab 1     No facility-administered encounter medications on file as of 7/2/2018.      ROS.      Objective:   /84   Pulse 63   Resp 14   Ht 1.702 m (5' 7\")   Wt 96.2 kg (212 lb)   SpO2 95%   BMI 33.20 kg/m²     Physical Exam General: WD, WN, male in NAD. Weight up 2# in the past week but down 10# in the past 6 months .  The exam is limited due to constraints of telemedicine.  The stethoscope was not functional today.  Neuro: Alert, oriented  Psych: normal mood, affect  Results for JERI SIMMONS (MRN [id number]) as of 7/2/2018 08:31   Ref. Range 6/15/2018 07:15   Sodium Latest Ref Range: 135 - 145 mmol/L 134 (L)   Potassium Latest Ref Range: 3.6 - 5.5 mmol/L 3.8   Chloride Latest Ref Range: 96 - 112 mmol/L 102   Co2 Latest Ref Range: 20 - 33 mmol/L 24   Anion Gap Latest Ref Range: 0.0 - 11.9  8.0   Glucose Latest Ref Range: 65 - 99 mg/dL 99   Bun Latest Ref Range: 8 - 22 mg/dL 14   Creatinine Latest Ref " Range: 0.50 - 1.40 mg/dL 0.73   GFR If  Latest Ref Range: >60 mL/min/1.73 m 2 >60   GFR If Non  Latest Ref Range: >60 mL/min/1.73 m 2 >60   Calcium Latest Ref Range: 8.5 - 10.5 mg/dL 9.6   AST(SGOT) Latest Ref Range: 12 - 45 U/L 21   ALT(SGPT) Latest Ref Range: 2 - 50 U/L 36   Alkaline Phosphatase Latest Ref Range: 30 - 99 U/L 44   Total Bilirubin Latest Ref Range: 0.1 - 1.5 mg/dL 0.9   Albumin Latest Ref Range: 3.2 - 4.9 g/dL 4.4   Total Protein Latest Ref Range: 6.0 - 8.2 g/dL 7.3   Globulin Latest Ref Range: 1.9 - 3.5 g/dL 2.9   A-G Ratio Latest Units: g/dL 1.5   Cholesterol,Tot Latest Ref Range: 100 - 199 mg/dL 213 (H)   Triglycerides Latest Ref Range: 0 - 149 mg/dL 162 (H)   HDL Latest Ref Range: >=40 mg/dL 76   LDL Latest Ref Range: <100 mg/dL 105 (H)         Assessment:     1. PVC's (premature ventricular contractions)  ECHOCARDIOGRAM COMP W/O CONT   2. Heart murmur  ECHOCARDIOGRAM COMP W/O CONT   3. Essential hypertension         Medical Decision Making:  Today's Assessment / Status / Plan:   The uniform PVCs that have been documented are likely of a benign nature.  He has already cut back on caffeine and does not drink alcohol.  He is not particularly symptomatic therefore no treatment is likely to benefit patient from a prognostic or a symptomatic standpoint.  I did suggest an echocardiogram to make sure his left ventricular function is normal and to  evaluate his valvular function.  I asked the schedulers to have the patient come to [facility] for the echo in order for me to examine the patient by auscultation as there is a history of heart murmur in the past.  I suspect that his exam will be normal and his left ventricular function will be normal, therefore, treatment of the PVCs will not be indicated.  He exercised for 9 minutes on a Juan protocol in 2016 and is asymptomatic from a coronary ischemic standpoint.  I do not think further ischemic assessment is necessary unless  his left ventricular function is abnormal.  He will call for the results of his study.  He is encouraged to continue his efforts at weight loss.  His blood pressure is well controlled with ACE inhibitor and mild diuretic therapy.

## 2018-07-05 ENCOUNTER — TELEMEDICINE ORIGINATING SITE VISIT (OUTPATIENT)
Dept: MEDICAL GROUP | Facility: CLINIC | Age: 48
End: 2018-07-05
Payer: COMMERCIAL

## 2018-07-05 DIAGNOSIS — I49.3 FREQUENT UNIFOCAL PVCS: ICD-10-CM

## 2018-07-30 ENCOUNTER — HOSPITAL ENCOUNTER (OUTPATIENT)
Dept: CARDIOLOGY | Facility: MEDICAL CENTER | Age: 48
End: 2018-07-30
Attending: INTERNAL MEDICINE
Payer: COMMERCIAL

## 2018-07-30 PROCEDURE — 93306 TTE W/DOPPLER COMPLETE: CPT

## 2018-10-10 ENCOUNTER — OFFICE VISIT (OUTPATIENT)
Dept: PULMONOLOGY | Facility: HOSPICE | Age: 48
End: 2018-10-10
Payer: COMMERCIAL

## 2018-10-10 ENCOUNTER — NON-PROVIDER VISIT (OUTPATIENT)
Dept: PULMONOLOGY | Facility: HOSPICE | Age: 48
End: 2018-10-10
Payer: COMMERCIAL

## 2018-10-10 VITALS
HEIGHT: 67 IN | WEIGHT: 221 LBS | BODY MASS INDEX: 34.69 KG/M2 | TEMPERATURE: 98.8 F | RESPIRATION RATE: 16 BRPM | OXYGEN SATURATION: 94 % | SYSTOLIC BLOOD PRESSURE: 140 MMHG | DIASTOLIC BLOOD PRESSURE: 90 MMHG | HEART RATE: 61 BPM

## 2018-10-10 DIAGNOSIS — J45.20 MILD INTERMITTENT ASTHMA WITHOUT COMPLICATION: ICD-10-CM

## 2018-10-10 DIAGNOSIS — J30.2 SEASONAL ALLERGIES: ICD-10-CM

## 2018-10-10 DIAGNOSIS — G47.33 OSA (OBSTRUCTIVE SLEEP APNEA): ICD-10-CM

## 2018-10-10 PROCEDURE — 99214 OFFICE O/P EST MOD 30 MIN: CPT | Performed by: NURSE PRACTITIONER

## 2018-10-10 PROCEDURE — 94060 EVALUATION OF WHEEZING: CPT | Performed by: INTERNAL MEDICINE

## 2018-10-10 ASSESSMENT — PULMONARY FUNCTION TESTS
FEV1_PREDICTED: 3.55
FVC: 3.8
FEV1: 2.94
FEV1_PERCENT_CHANGE: 3
FEV1/FVC: 79
FVC_PERCENT_PREDICTED: 81
FEV1_PERCENT_CHANGE: 2
FEV1/FVC: 80
FEV1: 3.04
FEV1/FVC_PREDICTED: 77.85
FVC_PREDICTED: 4.56
FEV1_PREDICTED: 82
FEV1/FVC_PERCENT_CHANGE: 150
FEV1/FVC_PERCENT_PREDICTED: 101
FVC: 3.72

## 2018-10-10 NOTE — PATIENT INSTRUCTIONS
Plan:    1) Continue CPAP at 12 CM H20. Order for new mask and supplies sent to his DME.   2) Sleep hygiene discussed. Weight loss recommended.  3) Encouraged routine walking/exercise. Recommend use of Albuterol prior to exercise.   4) He is up to date on Pneumovax 23 and Influenza vaccines.  5) He is off inhalers, but does have an Albuterol and Qvar inhaler on hand. We discussed stepwise treatment approach to Asthma.   6) Add Flonase nasal spray and Mucinex OTC prn.   7) Annual follow up with repeat Spirometry, sooner OV if needed.

## 2018-10-10 NOTE — PROCEDURES
Good patient effort & cooperation. The results of this test meet the ATS standards for acceptability and repeatability. Test was performed on the Brightblue/D spirometry system. Predicted values were N-Ramon. A bronchodilator of Ventolin HFA - 2 puffs with a spacer was administered to the patient.    Spirometry was completed on October 10, 2018.  Patient had borderline reduction of mid flows and FEV1, FEV1 was 2.94 L 82% predicted.  Bronchodilator response was borderline, clinical trial could be indicated.  Superimposed restriction could be present not assessed by spirometry alone.  Impression is mild obstruction with normal-appearing flow volume loop
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
## 2018-10-22 ENCOUNTER — TELEPHONE (OUTPATIENT)
Dept: PULMONOLOGY | Facility: HOSPICE | Age: 48
End: 2018-10-22

## 2018-10-22 NOTE — TELEPHONE ENCOUNTER
Pt mailed in [facility] paperwork Due 10/30/18.Asked to be faxed to [phone] when completed. Filled out and put in [facility] in box.

## 2018-10-24 ENCOUNTER — TELEPHONE (OUTPATIENT)
Dept: PULMONOLOGY | Facility: HOSPICE | Age: 48
End: 2018-10-24

## 2018-10-24 NOTE — TELEPHONE ENCOUNTER
The reason for the bill was pt did not notify angel when he changed from [facility] to [facility].  They had submitted claim to [facility] and it was denied - hence the reason for the bill.  Yani [facility] explained now that they have the correct insurance info, they will resubmit to [facility].  Pt will still have a portion to pay but it will be significantly less.  She will personally call the pt and explain this to him.    I also left him a detailed message with this information and explained nothing was needed from us at this time

## 2018-10-24 NOTE — TELEPHONE ENCOUNTER
Patient came in with a copy of his bill from [employer] and believes that they need something from us and that is why he is getting a bill.  I will verify if that is correct or if this is just his portion of the payment due for equipment.

## 2018-11-05 RX ORDER — METFORMIN HYDROCHLORIDE 750 MG/1
750 TABLET, EXTENDED RELEASE ORAL DAILY
Qty: 30 TAB | Refills: 0 | OUTPATIENT
Start: 2018-11-05

## 2018-11-05 NOTE — TELEPHONE ENCOUNTER
Was the patient seen in the last year in this department? Yes    Does patient have an active prescription for medications requested? Yes    Received Request Via: Patient   Last Appointment 6/5/18  Last Labs 6/15/18

## 2018-11-08 RX ORDER — METFORMIN HYDROCHLORIDE 750 MG/1
750 TABLET, EXTENDED RELEASE ORAL DAILY
Qty: 30 TAB | Refills: 0 | Status: SHIPPED | OUTPATIENT
Start: 2018-11-08 | End: 2018-11-21 | Stop reason: SDUPTHER

## 2018-11-21 ENCOUNTER — OFFICE VISIT (OUTPATIENT)
Dept: MEDICAL GROUP | Facility: CLINIC | Age: 48
End: 2018-11-21
Payer: COMMERCIAL

## 2018-11-21 VITALS
RESPIRATION RATE: 16 BRPM | SYSTOLIC BLOOD PRESSURE: 132 MMHG | HEIGHT: 67 IN | WEIGHT: 218.4 LBS | BODY MASS INDEX: 34.28 KG/M2 | TEMPERATURE: 98.2 F | DIASTOLIC BLOOD PRESSURE: 90 MMHG | HEART RATE: 64 BPM | OXYGEN SATURATION: 97 %

## 2018-11-21 DIAGNOSIS — E11.8 TYPE 2 DIABETES MELLITUS WITH COMPLICATION, WITHOUT LONG-TERM CURRENT USE OF INSULIN (HCC): ICD-10-CM

## 2018-11-21 DIAGNOSIS — I10 ESSENTIAL HYPERTENSION: ICD-10-CM

## 2018-11-21 PROBLEM — Z00.00 ENCOUNTER FOR MEDICAL EXAMINATION TO ESTABLISH CARE: Status: RESOLVED | Noted: 2018-06-05 | Resolved: 2018-11-21

## 2018-11-21 LAB
HBA1C MFR BLD: 5.1 % (ref ?–5.8)
INT CON NEG: NEGATIVE
INT CON POS: POSITIVE

## 2018-11-21 PROCEDURE — 99213 OFFICE O/P EST LOW 20 MIN: CPT | Performed by: PHYSICIAN ASSISTANT

## 2018-11-21 PROCEDURE — 83036 HEMOGLOBIN GLYCOSYLATED A1C: CPT | Performed by: PHYSICIAN ASSISTANT

## 2018-11-21 RX ORDER — METFORMIN HYDROCHLORIDE 750 MG/1
750 TABLET, EXTENDED RELEASE ORAL DAILY
Qty: 90 TAB | Refills: 1 | Status: SHIPPED | OUTPATIENT
Start: 2018-11-21 | End: 2019-05-14 | Stop reason: SDUPTHER

## 2018-11-21 RX ORDER — LISINOPRIL AND HYDROCHLOROTHIAZIDE 25; 20 MG/1; MG/1
1 TABLET ORAL DAILY
Qty: 90 TAB | Refills: 1 | Status: SHIPPED | OUTPATIENT
Start: 2018-11-21 | End: 2019-08-26 | Stop reason: SDUPTHER

## 2018-11-21 NOTE — PROGRESS NOTES
Chief Complaint   Patient presents with   • Other     medications       HISTORY OF PRESENT ILLNESS: Patient is a 48 y.o. male established patient who presents today for evaluation and management of:    Essential hypertension  This is a chronic problem that is currently well controlled on daily-hydrochlorothiazide 20-25 mg/tab.  This patient's blood pressure is 132/90 although he states he does have some white coat syndrome today.  He denies chest pain, shortness of breath or blurry vision.  He, additionally, has an upper respiratory infection at this time.    Type 2 diabetes mellitus with complication, without long-term current use of insulin ([facility])  Last A1c: 5.1% 11/21/18   DM Medications: Metformin extended release 750 mg p.o. once daily patient reports good medication compliance.   HTN: Blood pressure goal <140/<90 Yes  ACE: Lisinopril  Hyperlipidemia: Cholesterol goal LDL <100 No.  LDL currently 105 in June 2018  Currently Rx Statin: None  Diabetic diet: Yes  Exercise: None  Last monofilament foot exam: November 21, 2018 with normal sensation intact to 10 g test in all 4 areas tested in bilateral feet.  Capillary refill less than 3 seconds.  No onychomycosis noted except on right pinky toenail.  No lesions or cracks on feet.  Feet are in good condition.  Checks feet at home: Yes, no sores currently   Last Eye exam: Patient cannot recall.  Kidney function: GFR greater than 60 June 2018  Microalbumin screening: Not recently tested.  Ordered today.  Has patient received flu vaccine: No patient refuses  Has patient received Hep B series:No    A1c goal <7 Yes  Current barriers to control include none  Glucose monitoring frequency: None  Hypoglycemic episodes No  Diabetic complications: none    The patient is taking ASA every day and is taking all other medications as prescribed. Patient denies any side effects of medication.         Patient Active Problem List    Diagnosis Date Noted   • Type 2 diabetes mellitus with  complication, without long-term current use of insulin ([facility]) 2018   • Obesity (BMI 30-39.9) 2018   • Elevated cortisol level ([facility]) 2018   • Frequent unifocal PVCs 2018   • Abuse of herbal or folk remedies 2018   • Mild intermittent asthma without complication 10/06/2016   • MAGDALENA (obstructive sleep apnea) 10/06/2016   • Essential hypertension 10/06/2016       Allergies:Pcn [penicillins]    Current Outpatient Prescriptions   Medication Sig Dispense Refill   • metFORMIN ER (GLUCOPHAGE XR) 750 MG TABLET SR 24 HR Take 1 Tab by mouth every day. 90 Tab 1   • lisinopril-hydrochlorothiazide (PRINZIDE, ZESTORETIC) 20-25 MG per tablet Take 1 Tab by mouth every day. 90 Tab 1   • aspirin 81 MG EC tablet Take 81 mg by mouth every day at 6 PM.       No current facility-administered medications for this visit.        Social History   Substance Use Topics   • Smoking status: Never Smoker   • Smokeless tobacco: Never Used   • Alcohol use No       Family Status   Relation Status   • Fa    • Mo    • Bro Other   • Bro Other   • Child Alive     Family History   Problem Relation Age of Onset   • Cancer Father    • Heart Failure Mother    • No Known Problems Child        Review of Systems: See HPI above.   Constitutional: Negative for fever, chills, weight loss and malaise.   HENT: Negative for ear pain, nosebleeds, congestion, sore throat and neck pain.    Eyes: Negative for blurred vision.   Respiratory: Negative for shortness of breath, cough, sputum production and wheezing.    Cardiovascular: Negative for chest pain, palpitations, orthopnea and leg swelling.   Gastrointestinal: Negative for heartburn, nausea, vomiting and abdominal pain.   Genitourinary: Negative for dysuria, urgency and frequency.   Musculoskeletal: Negative for myalgias, back pain and joint pain.   Skin: Negative for rash and itching.   Neurological: Negative for dizziness, tingling, tremors, sensory change, focal weakness  "and headaches.   Endo/Heme/Allergies: Does not bruise/bleed easily.   Psychiatric/Behavioral: Negative for depression, suicidal ideas and memory loss.  The patient is not nervous/anxious and does not have insomnia.      Exam:  Blood pressure 132/90, pulse 64, temperature 36.8 °C (98.2 °F), temperature source Temporal, resp. rate 16, height 1.702 m (5' 7\"), weight 99.1 kg (218 lb 6.4 oz), SpO2 97 %.  Body mass index is 34.21 kg/m².  General:  Obese male in NAD  Head: is grossly normal.  Neck: Supple without masses. Thyroid is not visibly enlarged.  Pulmonary: Clear to ausculation. Normal effort. No rales, ronchi, or wheezing.  Cardiovascular: Regular rate and rhythm without murmur. Carotid pulses are intact and equal bilaterally.  Extremities: no clubbing, cyanosis, or edema.    Medical decision-making and discussion:  1. Type 2 diabetes mellitus with complication, without long-term current use of insulin (HCC)    - metFORMIN ER (GLUCOPHAGE XR) 750 MG TABLET SR 24 HR; Take 1 Tab by mouth every day.  Dispense: 90 Tab; Refill: 1  - Lipid Profile; Future  - POCT  A1C  - Diabetic Monofilament LE Exam  - MICROALBUMIN CREAT RATIO URINE; Future    2. Essential hypertension    - COMP METABOLIC PANEL; Future  - lisinopril-hydrochlorothiazide (PRINZIDE, ZESTORETIC) 20-25 MG per tablet; Take 1 Tab by mouth every day.  Dispense: 90 Tab; Refill: 1  - MICROALBUMIN CREAT RATIO URINE; Future      Please note that this dictation was created using voice recognition software. I have made every reasonable attempt to correct obvious errors, but I expect that there are errors of grammar and possibly content that I did not discover before finalizing the note.      Return in about 6 months (around 5/21/2019) for DM, HTN, HLD.  "

## 2018-11-21 NOTE — ASSESSMENT & PLAN NOTE
Last A1c: 5.1% 11/21/18   DM Medications: Metformin extended release 750 mg p.o. once daily patient reports good medication compliance.   HTN: Blood pressure goal <140/<90 Yes  ACE: Lisinopril  Hyperlipidemia: Cholesterol goal LDL <100 No.  LDL currently 105 in June 2018  Currently Rx Statin: None  Diabetic diet: Yes  Exercise: None  Last monofilament foot exam: November 21, 2018 with normal sensation intact to 10 g test in all 4 areas tested in bilateral feet.  Capillary refill less than 3 seconds.  No onychomycosis noted except on right pinky toenail.  No lesions or cracks on feet.  Feet are in good condition.  Checks feet at home: Yes, no sores currently   Last Eye exam: Patient cannot recall.  Kidney function: GFR greater than 60 June 2018  Microalbumin screening: Not recently tested.  Ordered today.  Has patient received flu vaccine: No patient refuses  Has patient received Hep B series:No    A1c goal <7 Yes  Current barriers to control include none  Glucose monitoring frequency: None  Hypoglycemic episodes No  Diabetic complications: none    The patient is taking ASA every day and is taking all other medications as prescribed. Patient denies any side effects of medication.

## 2018-11-21 NOTE — ASSESSMENT & PLAN NOTE
This is a chronic problem that is currently well controlled on daily-hydrochlorothiazide 20-25 mg/tab.  This patient's blood pressure is 132/90 although he states he does have some white coat syndrome today.  He denies chest pain, shortness of breath or blurry vision.  He, additionally, has an upper respiratory infection at this time.

## 2019-02-13 ENCOUNTER — OFFICE VISIT (OUTPATIENT)
Dept: MEDICAL GROUP | Facility: CLINIC | Age: 49
End: 2019-02-13
Payer: COMMERCIAL

## 2019-02-13 VITALS
RESPIRATION RATE: 16 BRPM | DIASTOLIC BLOOD PRESSURE: 86 MMHG | WEIGHT: 222 LBS | TEMPERATURE: 98.1 F | SYSTOLIC BLOOD PRESSURE: 124 MMHG | BODY MASS INDEX: 34.84 KG/M2 | OXYGEN SATURATION: 96 % | HEIGHT: 67 IN | HEART RATE: 76 BPM

## 2019-02-13 DIAGNOSIS — E78.2 MIXED HYPERLIPIDEMIA: ICD-10-CM

## 2019-02-13 DIAGNOSIS — E66.9 OBESITY (BMI 30-39.9): ICD-10-CM

## 2019-02-13 DIAGNOSIS — E11.8 TYPE 2 DIABETES MELLITUS WITH COMPLICATION, WITHOUT LONG-TERM CURRENT USE OF INSULIN (HCC): ICD-10-CM

## 2019-02-13 DIAGNOSIS — I10 ESSENTIAL HYPERTENSION: ICD-10-CM

## 2019-02-13 PROCEDURE — 99213 OFFICE O/P EST LOW 20 MIN: CPT | Performed by: PHYSICIAN ASSISTANT

## 2019-02-13 RX ORDER — SIMVASTATIN 10 MG
10 TABLET ORAL EVERY EVENING
Qty: 90 TAB | Refills: 3 | Status: SHIPPED | OUTPATIENT
Start: 2019-02-13 | End: 2020-01-27 | Stop reason: SDUPTHER

## 2019-02-13 RX ORDER — ROSUVASTATIN CALCIUM 5 MG/1
5 TABLET, COATED ORAL EVERY EVENING
COMMUNITY
End: 2019-02-13

## 2019-02-13 ASSESSMENT — PATIENT HEALTH QUESTIONNAIRE - PHQ9: CLINICAL INTERPRETATION OF PHQ2 SCORE: 0

## 2019-02-14 NOTE — ASSESSMENT & PLAN NOTE
This is poorly controlled as patient does not exercise her reduce his calorie intake for weight loss.  Weight has increased from 218pounds to 222 pounds in the last 4 months.

## 2019-02-14 NOTE — PROGRESS NOTES
Chief Complaint   Patient presents with   • Diabetes     FV   • Other     FMLA Paperwork        HISTORY OF PRESENT ILLNESS: Patient is a 48 y.o. male established patient who presents today for evaluation and management of:    Type 2 diabetes mellitus with complication, without long-term current use of insulin ([facility])  Well controlled.     Essential hypertension  Continue lisinopril-hydrochlorothiazide every morning as prescribed. 10 min spent in discussion of FMLA paperwork.  Patient states he is unable to work for longer than 12 hours without experiencing side effects from elevated blood pressures.  Patient denies chest pain or shortness of breath unless working for greater than 12 hours.    Obesity (BMI 30-39.9)  This is poorly controlled as patient does not exercise her reduce his calorie intake for weight loss.  Weight has increased from 218pounds to 222 pounds in the last 4 months.       Patient Active Problem List    Diagnosis Date Noted   • Type 2 diabetes mellitus with complication, without long-term current use of insulin ([facility]) 11/21/2018   • Obesity (BMI 30-39.9) 06/05/2018   • Elevated cortisol level ([facility]) 06/05/2018   • Frequent unifocal PVCs 06/05/2018   • Abuse of herbal or folk remedies 06/05/2018   • Mild intermittent asthma without complication 10/06/2016   • MAGDALENA (obstructive sleep apnea) 10/06/2016   • Essential hypertension 10/06/2016       Allergies:Pcn [penicillins]    Current Outpatient Prescriptions   Medication Sig Dispense Refill   • simvastatin (ZOCOR) 10 MG Tab Take 1 Tab by mouth every evening. 90 Tab 3   • metFORMIN ER (GLUCOPHAGE XR) 750 MG TABLET SR 24 HR Take 1 Tab by mouth every day. 90 Tab 1   • lisinopril-hydrochlorothiazide (PRINZIDE, ZESTORETIC) 20-25 MG per tablet Take 1 Tab by mouth every day. 90 Tab 1   • aspirin 81 MG EC tablet Take 81 mg by mouth every day at 6 PM.       No current facility-administered medications for this visit.        Social History   Substance Use Topics  "  • Smoking status: Never Smoker   • Smokeless tobacco: Never Used   • Alcohol use No       Family Status   Relation Status   • Fa    • Mo    • Bro Other   • Bro Other   • Child Alive     Family History   Problem Relation Age of Onset   • Cancer Father    • Heart Failure Mother    • No Known Problems Child        Review of Systems: See HPI above.   Constitutional: Negative for fever, chills, weight loss and malaise.   HENT: Negative for ear pain, nosebleeds, congestion, sore throat and neck pain.    Eyes: Negative for blurred vision.   Respiratory: Negative for shortness of breath, cough, sputum production and wheezing.    Cardiovascular: Negative for chest pain, palpitations, orthopnea and leg swelling.   Gastrointestinal: Negative for heartburn, nausea, vomiting and abdominal pain.   Genitourinary: Negative for dysuria, urgency and frequency.   Musculoskeletal: Negative for myalgias, back pain and joint pain.   Skin: Negative for rash and itching.   Neurological: Negative for dizziness, tingling, tremors, sensory change, focal weakness and headaches.   Endo/Heme/Allergies: Does not bruise/bleed easily.   Psychiatric/Behavioral: Negative for depression, suicidal ideas and memory loss.  The patient is not nervous/anxious and does not have insomnia.      Exam:  Blood pressure 124/86, pulse 76, temperature 36.7 °C (98.1 °F), temperature source Temporal, resp. rate 16, height 1.702 m (5' 7\"), weight 100.7 kg (222 lb), SpO2 96 %.  Body mass index is 34.77 kg/m².  General:  Obese male in NAD  Head: is grossly normal.  Neck: Supple without masses. Thyroid is not visibly enlarged.  Pulmonary: Clear to ausculation. Normal effort. No rales, ronchi, or wheezing.  Cardiovascular: Regular rate and rhythm without murmur. Carotid pulses are intact and equal bilaterally.  Extremities: no clubbing, cyanosis, or edema.    Medical decision-making and discussion:  1. Mixed hyperlipidemia    - simvastatin (ZOCOR) 10 MG " Tab; Take 1 Tab by mouth every evening.  Dispense: 90 Tab; Refill: 3    2. Type 2 diabetes mellitus with complication, without long-term current use of insulin (HCC)  Continue metformin as prescribed.    3. Essential hypertension  Diet and exercise discussed.  Patient FMLA for paperwork was completed which took at least 15 minutes.    4. Obesity (BMI 30-39.9)        Please note that this dictation was created using voice recognition software. I have made every reasonable attempt to correct obvious errors, but I expect that there are errors of grammar and possibly content that I did not discover before finalizing the note.      Return in about 6 weeks (around 3/27/2019) for for labs, 6 months for follow up with me. .

## 2019-02-14 NOTE — ASSESSMENT & PLAN NOTE
Continue lisinopril-hydrochlorothiazide every morning as prescribed. 10 min spent in discussion of [place] paperwork.  Patient states he is unable to work for longer than 12 hours without experiencing side effects from elevated blood pressures.  Patient denies chest pain or shortness of breath unless working for greater than 12 hours.

## 2019-06-05 ENCOUNTER — NON-PROVIDER VISIT (OUTPATIENT)
Dept: MEDICAL GROUP | Facility: CLINIC | Age: 49
End: 2019-06-05
Payer: COMMERCIAL

## 2019-06-05 ENCOUNTER — OFFICE VISIT (OUTPATIENT)
Dept: MEDICAL GROUP | Facility: CLINIC | Age: 49
End: 2019-06-05
Payer: COMMERCIAL

## 2019-06-05 ENCOUNTER — HOSPITAL ENCOUNTER (OUTPATIENT)
Facility: MEDICAL CENTER | Age: 49
End: 2019-06-05
Attending: PHYSICIAN ASSISTANT
Payer: COMMERCIAL

## 2019-06-05 VITALS
BODY MASS INDEX: 35.94 KG/M2 | OXYGEN SATURATION: 98 % | TEMPERATURE: 98.4 F | WEIGHT: 229 LBS | HEART RATE: 51 BPM | DIASTOLIC BLOOD PRESSURE: 82 MMHG | HEIGHT: 67 IN | SYSTOLIC BLOOD PRESSURE: 140 MMHG

## 2019-06-05 DIAGNOSIS — E11.8 TYPE 2 DIABETES MELLITUS WITH COMPLICATION, WITHOUT LONG-TERM CURRENT USE OF INSULIN (HCC): ICD-10-CM

## 2019-06-05 DIAGNOSIS — S83.412A SPRAIN OF MEDIAL COLLATERAL LIGAMENT OF LEFT KNEE, INITIAL ENCOUNTER: ICD-10-CM

## 2019-06-05 DIAGNOSIS — I10 ESSENTIAL HYPERTENSION: ICD-10-CM

## 2019-06-05 PROCEDURE — 80053 COMPREHEN METABOLIC PANEL: CPT

## 2019-06-05 PROCEDURE — 82570 ASSAY OF URINE CREATININE: CPT

## 2019-06-05 PROCEDURE — 99213 OFFICE O/P EST LOW 20 MIN: CPT | Performed by: PHYSICIAN ASSISTANT

## 2019-06-05 PROCEDURE — 36415 COLL VENOUS BLD VENIPUNCTURE: CPT | Performed by: PHYSICIAN ASSISTANT

## 2019-06-05 PROCEDURE — 82043 UR ALBUMIN QUANTITATIVE: CPT

## 2019-06-05 PROCEDURE — 99000 SPECIMEN HANDLING OFFICE-LAB: CPT | Performed by: PHYSICIAN ASSISTANT

## 2019-06-05 PROCEDURE — 80061 LIPID PANEL: CPT

## 2019-06-05 NOTE — ASSESSMENT & PLAN NOTE
Patient states he fell approximately 3 weeks ago on his left knee and continues to have pain.  He states he has been adjusted multiple times by a chiropractor and continues to have pain.  He is using Aspercreme for relief but this does not seem to be working very well at this time.

## 2019-06-05 NOTE — PROGRESS NOTES
Chief Complaint   Patient presents with   • Knee Pain     L knee, due to fall   • Shoulder Pain     R shoulder, due to fall       HISTORY OF PRESENT ILLNESS: Patient is a 48 y.o. male established patient who presents today for evaluation and management of:    Sprain of medial collateral ligament of left knee  Patient states he fell approximately 3 weeks ago on his left knee and continues to have pain.  He states he has been adjusted multiple times by a chiropractor and continues to have pain.  He is using Aspercreme for relief but this does not seem to be working very well at this time.       Patient Active Problem List    Diagnosis Date Noted   • Sprain of medial collateral ligament of left knee 2019   • Type 2 diabetes mellitus with complication, without long-term current use of insulin ([facility]) 2018   • Obesity (BMI 30-39.9) 2018   • Elevated cortisol level ([facility]) 2018   • Frequent unifocal PVCs 2018   • Abuse of herbal or folk remedies 2018   • Mild intermittent asthma without complication 10/06/2016   • MAGDALENA (obstructive sleep apnea) 10/06/2016   • Essential hypertension 10/06/2016       Allergies:Pcn [penicillins]    Current Outpatient Prescriptions   Medication Sig Dispense Refill   • Diclofenac Sodium 1 % Gel Apply 1 mg to skin as directed 2 times a day as needed. 30 g 0   • metFORMIN ER (GLUCOPHAGE XR) 750 MG TABLET SR 24 HR TAKE ONE TABLET BY MOUTH DAILY 90 Tab 2   • simvastatin (ZOCOR) 10 MG Tab Take 1 Tab by mouth every evening. 90 Tab 3   • lisinopril-hydrochlorothiazide (PRINZIDE, ZESTORETIC) 20-25 MG per tablet Take 1 Tab by mouth every day. 90 Tab 1   • aspirin 81 MG EC tablet Take 81 mg by mouth every day at 6 PM.       No current facility-administered medications for this visit.        Social History   Substance Use Topics   • Smoking status: Never Smoker   • Smokeless tobacco: Never Used   • Alcohol use No       Family Status   Relation Status   • Fa    • Mo  "   • Bro Other   • Bro Other   • Child Alive     Family History   Problem Relation Age of Onset   • Cancer Father    • Heart Failure Mother    • No Known Problems Child        Review of Systems: See HPI above.   Constitutional: Negative for fever, chills, weight loss and malaise.   Respiratory: Negative for shortness of breath  Cardiovascular: Negative for chest pain  Musculoskeletal: Negative for myalgias, back pain and positive for left knee and right shoulder pain.   Neurological: Negative for dizziness, tingling, tremors, sensory change, focal weakness and headaches.       Exam:  /82 (BP Location: Left arm, Patient Position: Sitting, BP Cuff Size: Large adult)   Pulse (!) 51   Temp 36.9 °C (98.4 °F) (Temporal)   Ht 1.702 m (5' 7\")   Wt 103.9 kg (229 lb)   SpO2 98%   Body mass index is 35.87 kg/m².  General:  Obese male in NAD  Head: is grossly normal.  Neck: Supple without masses. Thyroid is not visibly enlarged.  Pulmonary: Normal effort.  Cardiovascular: Carotid pulses are intact and equal bilaterally.  Extremities: no clubbing, cyanosis, or edema.  Musculoskeletal: Pain with lateral torsion of left leg.    Medical decision-making and discussion:  1. Sprain of medial collateral ligament of left knee, initial encounter  Patient provided with knee brace today.  - Diclofenac Sodium 1 % Gel; Apply 1 mg to skin as directed 2 times a day as needed.  Dispense: 30 g; Refill: 0      Please note that this dictation was created using voice recognition software. I have made every reasonable attempt to correct obvious errors, but I expect that there are errors of grammar and possibly content that I did not discover before finalizing the note.      Return in about 3 months (around 2019) for Chronic conditions.  "

## 2019-06-06 ENCOUNTER — TELEPHONE (OUTPATIENT)
Dept: MEDICAL GROUP | Facility: CLINIC | Age: 49
End: 2019-06-06

## 2019-06-06 LAB
ALBUMIN SERPL BCP-MCNC: 4.5 G/DL (ref 3.2–4.9)
ALBUMIN/GLOB SERPL: 2 G/DL
ALP SERPL-CCNC: 42 U/L (ref 30–99)
ALT SERPL-CCNC: 68 U/L (ref 2–50)
ANION GAP SERPL CALC-SCNC: 9 MMOL/L (ref 0–11.9)
AST SERPL-CCNC: 47 U/L (ref 12–45)
BILIRUB SERPL-MCNC: 1.4 MG/DL (ref 0.1–1.5)
BUN SERPL-MCNC: 16 MG/DL (ref 8–22)
CALCIUM SERPL-MCNC: 9.5 MG/DL (ref 8.5–10.5)
CHLORIDE SERPL-SCNC: 101 MMOL/L (ref 96–112)
CHOLEST SERPL-MCNC: 213 MG/DL (ref 100–199)
CO2 SERPL-SCNC: 26 MMOL/L (ref 20–33)
CREAT SERPL-MCNC: 0.9 MG/DL (ref 0.5–1.4)
CREAT UR-MCNC: 290.3 MG/DL
GLOBULIN SER CALC-MCNC: 2.3 G/DL (ref 1.9–3.5)
GLUCOSE SERPL-MCNC: 111 MG/DL (ref 65–99)
HDLC SERPL-MCNC: 65 MG/DL
LDLC SERPL CALC-MCNC: 96 MG/DL
MICROALBUMIN UR-MCNC: 1.2 MG/DL
MICROALBUMIN/CREAT UR: 4 MG/G (ref 0–30)
POTASSIUM SERPL-SCNC: 4.1 MMOL/L (ref 3.6–5.5)
PROT SERPL-MCNC: 6.8 G/DL (ref 6–8.2)
SODIUM SERPL-SCNC: 136 MMOL/L (ref 135–145)
TRIGL SERPL-MCNC: 261 MG/DL (ref 0–149)

## 2019-06-06 NOTE — TELEPHONE ENCOUNTER
Phone Number Called: [phone]     Call outcome: left message for patient to call back regarding message below    Message:

## 2019-06-06 NOTE — TELEPHONE ENCOUNTER
----- Message from Tasha Wyatt P.A.-C. sent at 6/6/2019 10:51 AM PDT -----  I reviewed labs. Liver function has decreased again, blood sugar is elevated and everything else is within normal limits and looks good. Reduce carbohydrate intake, work on weight loss and return for follow up as scheduled.

## 2019-06-13 ENCOUNTER — TELEPHONE (OUTPATIENT)
Dept: MEDICAL GROUP | Facility: CLINIC | Age: 49
End: 2019-06-13

## 2019-06-13 ENCOUNTER — TELEPHONE (OUTPATIENT)
Dept: PULMONOLOGY | Facility: HOSPICE | Age: 49
End: 2019-06-13

## 2019-06-13 DIAGNOSIS — J45.909 UNCOMPLICATED ASTHMA, UNSPECIFIED ASTHMA SEVERITY, UNSPECIFIED WHETHER PERSISTENT: ICD-10-CM

## 2019-06-13 RX ORDER — AZITHROMYCIN 250 MG/1
TABLET, FILM COATED ORAL
Qty: 6 TAB | Refills: 1 | Status: SHIPPED | OUTPATIENT
Start: 2019-06-13 | End: 2020-02-12

## 2019-06-13 NOTE — TELEPHONE ENCOUNTER
The patient called and stated [place] and is asking for an antibiotic to take with him in case he gets sick.  Last appt was 10/10/18 with David.  Please advise, thank you.

## 2019-06-13 NOTE — TELEPHONE ENCOUNTER
DOCUMENTATION OF PAR STATUS:    1. Name of Medication & Dose: Diclofenac Gel 1%     2. Name of Prescription Coverage Company & phone #: [employer]    3. Date Prior Auth Submitted: 6/13/19     4. What information was given to obtain insurance decision? All info on pain and last Chart note    5. Prior Auth Status? Approved    6. Patient Notified: yes

## 2019-08-28 ENCOUNTER — TELEPHONE (OUTPATIENT)
Dept: MEDICAL GROUP | Facility: CLINIC | Age: 49
End: 2019-08-28

## 2019-08-28 NOTE — TELEPHONE ENCOUNTER
Pt came into the office inquiring about his cologaurd test that has not been sent to him. Informed Pt that I would look into it for him and be in touch.

## 2019-08-29 ENCOUNTER — TELEPHONE (OUTPATIENT)
Dept: PULMONOLOGY | Facility: HOSPICE | Age: 49
End: 2019-08-29

## 2019-08-29 NOTE — TELEPHONE ENCOUNTER
VOICEMAIL 08/28/19  1. Caller Name: Zuhair                      Call Back Number: [phone] (home)       2. Message: Pt called and l/m.  RE: FMLA. If they new physician will being okay with taking it over.    3. Patient approves office to leave a detailed voicemail/MyChart message: N\A        08/29/19  Called and spoke with pt. Pt said wanted to see if the new physician would take over his FMLA paper work that Claudia Hernandez has been doing for him . Notified pt she probably would but he would need to be seen first.  Told pt to discuss this with Jessica Simmons PA-C at his appt.  Pt understood.

## 2019-10-09 ENCOUNTER — NON-PROVIDER VISIT (OUTPATIENT)
Dept: PULMONOLOGY | Facility: HOSPICE | Age: 49
End: 2019-10-09
Payer: COMMERCIAL

## 2019-10-09 ENCOUNTER — OFFICE VISIT (OUTPATIENT)
Dept: PULMONOLOGY | Facility: HOSPICE | Age: 49
End: 2019-10-09
Payer: COMMERCIAL

## 2019-10-09 VITALS
RESPIRATION RATE: 16 BRPM | BODY MASS INDEX: 35.31 KG/M2 | HEART RATE: 85 BPM | HEIGHT: 67 IN | OXYGEN SATURATION: 95 % | SYSTOLIC BLOOD PRESSURE: 136 MMHG | WEIGHT: 225 LBS | DIASTOLIC BLOOD PRESSURE: 82 MMHG

## 2019-10-09 VITALS — BODY MASS INDEX: 35.24 KG/M2 | WEIGHT: 225 LBS

## 2019-10-09 DIAGNOSIS — G47.33 OSA (OBSTRUCTIVE SLEEP APNEA): ICD-10-CM

## 2019-10-09 DIAGNOSIS — J45.20 MILD INTERMITTENT ASTHMA WITHOUT COMPLICATION: ICD-10-CM

## 2019-10-09 PROBLEM — F55.1: Status: RESOLVED | Noted: 2018-06-05 | Resolved: 2019-10-09

## 2019-10-09 PROCEDURE — 94060 EVALUATION OF WHEEZING: CPT | Performed by: INTERNAL MEDICINE

## 2019-10-09 PROCEDURE — 99214 OFFICE O/P EST MOD 30 MIN: CPT | Performed by: PHYSICIAN ASSISTANT

## 2019-10-09 ASSESSMENT — ENCOUNTER SYMPTOMS
DIZZINESS: 1
WEIGHT LOSS: 0
ORTHOPNEA: 0
ROS GI COMMENTS: NO DENTURES, NO DIFFICULTY SWALLOWING
SPUTUM PRODUCTION: 0
INSOMNIA: 0
HEARTBURN: 0
DIAPHORESIS: 0
EYES NEGATIVE: 1
COUGH: 1
TREMORS: 0
WHEEZING: 1
SORE THROAT: 1
PALPITATIONS: 1
SHORTNESS OF BREATH: 1
HEADACHES: 0
CLAUDICATION: 0
FEVER: 0
CHILLS: 0
SINUS PAIN: 1

## 2019-10-09 ASSESSMENT — PULMONARY FUNCTION TESTS
FEV1/FVC_PERCENT_CHANGE: 0
FEV1: 3.04
FEV1_PREDICTED: 86
FVC: 3.96
FVC_PREDICTED: 4.54
FVC_PERCENT_PREDICTED: 87
FVC_PERCENT_PREDICTED: 87
FEV1: 3.19
FEV1_PERCENT_PREDICTED: 90
FEV1/FVC: 77
FEV1_PERCENT_CHANGE: 0
FEV1/FVC_PREDICTED: 77.53
FEV1/FVC_PERCENT_PREDICTED: 103
FEV1/FVC: 80.56
FEV1_PREDICTED: 3.52
FVC: 3.96
FEV1_PERCENT_CHANGE: 4
FEV1/FVC_PERCENT_PREDICTED: 99
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
## 2019-10-09 NOTE — PROCEDURES
Technician: Luis Miguel Macario notes:  Good patient effort and cooperation. ATS standards met for reproducibility and acceptability. Predicted set is N-HanesIII.  A bronchodilator of Ventolin HFA 2 puffs via spacer was administered.    Interpretation:  FVC is normal, improved compared to 10/2018, from 3.72 L to 3.96 L  FEV1 is normal, improved compared to 10/2018, from 2.94L to 3.04 L  There is no obstruction  Normal spirometry    __________  Augusto Marcos MD  Pulmonary and Critical Care Medicine  Formerly [facility], later [facility]

## 2019-11-21 DIAGNOSIS — I10 ESSENTIAL HYPERTENSION: ICD-10-CM

## 2019-11-21 RX ORDER — LISINOPRIL AND HYDROCHLOROTHIAZIDE 25; 20 MG/1; MG/1
TABLET ORAL
Qty: 90 TAB | Refills: 0 | Status: SHIPPED | OUTPATIENT
Start: 2019-11-21 | End: 2020-01-27 | Stop reason: SDUPTHER

## 2020-01-27 ENCOUNTER — OFFICE VISIT (OUTPATIENT)
Dept: MEDICAL GROUP | Facility: CLINIC | Age: 50
End: 2020-01-27
Payer: COMMERCIAL

## 2020-01-27 VITALS
OXYGEN SATURATION: 93 % | DIASTOLIC BLOOD PRESSURE: 78 MMHG | TEMPERATURE: 98.1 F | HEIGHT: 67 IN | SYSTOLIC BLOOD PRESSURE: 122 MMHG | HEART RATE: 60 BPM | RESPIRATION RATE: 16 BRPM | BODY MASS INDEX: 35.91 KG/M2 | WEIGHT: 228.8 LBS

## 2020-01-27 DIAGNOSIS — E78.2 MIXED HYPERLIPIDEMIA: ICD-10-CM

## 2020-01-27 DIAGNOSIS — M25.512 ACUTE PAIN OF LEFT SHOULDER: ICD-10-CM

## 2020-01-27 DIAGNOSIS — E11.8 TYPE 2 DIABETES MELLITUS WITH COMPLICATION, WITHOUT LONG-TERM CURRENT USE OF INSULIN (HCC): ICD-10-CM

## 2020-01-27 DIAGNOSIS — E66.9 OBESITY (BMI 30-39.9): ICD-10-CM

## 2020-01-27 DIAGNOSIS — I10 ESSENTIAL HYPERTENSION: ICD-10-CM

## 2020-01-27 PROCEDURE — 99214 OFFICE O/P EST MOD 30 MIN: CPT | Performed by: PHYSICIAN ASSISTANT

## 2020-01-27 RX ORDER — METFORMIN HYDROCHLORIDE 750 MG/1
750 TABLET, EXTENDED RELEASE ORAL DAILY
Qty: 90 TAB | Refills: 2 | Status: SHIPPED | OUTPATIENT
Start: 2020-01-27 | End: 2020-11-17 | Stop reason: SDUPTHER

## 2020-01-27 RX ORDER — LISINOPRIL AND HYDROCHLOROTHIAZIDE 25; 20 MG/1; MG/1
1 TABLET ORAL DAILY
Qty: 90 TAB | Refills: 2 | Status: SHIPPED
Start: 2020-01-27 | End: 2020-03-24

## 2020-01-27 RX ORDER — SIMVASTATIN 10 MG
10 TABLET ORAL EVERY EVENING
Qty: 90 TAB | Refills: 3 | Status: SHIPPED | OUTPATIENT
Start: 2020-01-27 | End: 2020-10-12

## 2020-01-27 NOTE — ASSESSMENT & PLAN NOTE
Last A1c: 5.1% sustained for 1 year   DM Medications: metformin XR 750mg  Patient reports poor medication compliance.   HTN: Blood pressure goal <130/<80 Yes  ACE: lisinopril  Hyperlipidemia: Cholesterol goal LDL <100 No.   Currently Rx Statin: patient refuses on and off, currently not taking but will restart  Diabetic diet: No  Exercise: walks at work. No formal exercise.   Last monofilament foot exam: 11/2018wnl Checks feet at home: Yes, no sores currently   Last Eye exam: none recently    Kidney function: GFr > 60 December 2019  Microalbumin screening: wnl December 2019  Has patient received flu vaccine: Yes  Has patient received Hep B series:Yes    A1c goal <7 Yes  Current barriers to control include none   Glucose monitoring frequency: none.   Diabetic complications: cardiovascular disease    The patient is taking ASA every day and is not taking all other medications as prescribed. Patient denies any side effects of medication.

## 2020-01-27 NOTE — ASSESSMENT & PLAN NOTE
December 21st, 2019  Total: 241  HDL: 63  LDL: 151 mg/dL    Most recent labs as above. Continues to deny blurry vision or headache. Is not currently taking a statin medicine against medical advice. Does not modify diet to reduce cholesterol intake. Does not exercise. Patient denies adverse medication side effects including myalgias.

## 2020-01-27 NOTE — ASSESSMENT & PLAN NOTE
This is poorly controlled as patient does not exercise or reduce his calorie intake for weight loss.  Weight has increased from 218pounds to 228 pounds in the last year
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
## 2020-02-10 ENCOUNTER — HOSPITAL ENCOUNTER (EMERGENCY)
Facility: MEDICAL CENTER | Age: 50
End: 2020-02-11
Attending: EMERGENCY MEDICINE
Payer: COMMERCIAL

## 2020-02-10 DIAGNOSIS — B34.9 VIRAL ILLNESS: ICD-10-CM

## 2020-02-10 LAB — GLUCOSE BLD-MCNC: 131 MG/DL (ref 65–99)

## 2020-02-10 PROCEDURE — 82962 GLUCOSE BLOOD TEST: CPT

## 2020-02-10 PROCEDURE — 99284 EMERGENCY DEPT VISIT MOD MDM: CPT

## 2020-02-11 ENCOUNTER — APPOINTMENT (OUTPATIENT)
Dept: RADIOLOGY | Facility: MEDICAL CENTER | Age: 50
End: 2020-02-11
Attending: EMERGENCY MEDICINE
Payer: COMMERCIAL

## 2020-02-11 VITALS
OXYGEN SATURATION: 93 % | BODY MASS INDEX: 33.91 KG/M2 | SYSTOLIC BLOOD PRESSURE: 145 MMHG | HEART RATE: 105 BPM | RESPIRATION RATE: 20 BRPM | WEIGHT: 223.77 LBS | HEIGHT: 68 IN | TEMPERATURE: 98.2 F | DIASTOLIC BLOOD PRESSURE: 61 MMHG

## 2020-02-11 LAB
ALBUMIN SERPL BCP-MCNC: 4.8 G/DL (ref 3.2–4.9)
ALBUMIN/GLOB SERPL: 1.9 G/DL
ALP SERPL-CCNC: 47 U/L (ref 30–99)
ALT SERPL-CCNC: 40 U/L (ref 2–50)
ANION GAP SERPL CALC-SCNC: 14 MMOL/L (ref 0–11.9)
AST SERPL-CCNC: 34 U/L (ref 12–45)
BASOPHILS # BLD AUTO: 0.7 % (ref 0–1.8)
BASOPHILS # BLD: 0.05 K/UL (ref 0–0.12)
BILIRUB SERPL-MCNC: 0.5 MG/DL (ref 0.1–1.5)
BUN SERPL-MCNC: 16 MG/DL (ref 8–22)
CALCIUM SERPL-MCNC: 8.9 MG/DL (ref 8.5–10.5)
CHLORIDE SERPL-SCNC: 103 MMOL/L (ref 96–112)
CO2 SERPL-SCNC: 23 MMOL/L (ref 20–33)
CREAT SERPL-MCNC: 0.84 MG/DL (ref 0.5–1.4)
EKG IMPRESSION: NORMAL
EOSINOPHIL # BLD AUTO: 0.11 K/UL (ref 0–0.51)
EOSINOPHIL NFR BLD: 1.5 % (ref 0–6.9)
ERYTHROCYTE [DISTWIDTH] IN BLOOD BY AUTOMATED COUNT: 45.7 FL (ref 35.9–50)
GLOBULIN SER CALC-MCNC: 2.5 G/DL (ref 1.9–3.5)
GLUCOSE SERPL-MCNC: 104 MG/DL (ref 65–99)
HCT VFR BLD AUTO: 46.7 % (ref 42–52)
HGB BLD-MCNC: 16.6 G/DL (ref 14–18)
IMM GRANULOCYTES # BLD AUTO: 0.03 K/UL (ref 0–0.11)
IMM GRANULOCYTES NFR BLD AUTO: 0.4 % (ref 0–0.9)
LIPASE SERPL-CCNC: 34 U/L (ref 11–82)
LYMPHOCYTES # BLD AUTO: 1.62 K/UL (ref 1–4.8)
LYMPHOCYTES NFR BLD: 21.4 % (ref 22–41)
MCH RBC QN AUTO: 33.9 PG (ref 27–33)
MCHC RBC AUTO-ENTMCNC: 35.5 G/DL (ref 33.7–35.3)
MCV RBC AUTO: 95.3 FL (ref 81.4–97.8)
MONOCYTES # BLD AUTO: 0.99 K/UL (ref 0–0.85)
MONOCYTES NFR BLD AUTO: 13.1 % (ref 0–13.4)
NEUTROPHILS # BLD AUTO: 4.78 K/UL (ref 1.82–7.42)
NEUTROPHILS NFR BLD: 62.9 % (ref 44–72)
NRBC # BLD AUTO: 0 K/UL
NRBC BLD-RTO: 0 /100 WBC
PLATELET # BLD AUTO: 187 K/UL (ref 164–446)
PMV BLD AUTO: 8.7 FL (ref 9–12.9)
POTASSIUM SERPL-SCNC: 3.7 MMOL/L (ref 3.6–5.5)
PROT SERPL-MCNC: 7.3 G/DL (ref 6–8.2)
RBC # BLD AUTO: 4.9 M/UL (ref 4.7–6.1)
SODIUM SERPL-SCNC: 140 MMOL/L (ref 135–145)
TROPONIN T SERPL-MCNC: <6 NG/L (ref 6–19)
WBC # BLD AUTO: 7.6 K/UL (ref 4.8–10.8)

## 2020-02-11 PROCEDURE — 85025 COMPLETE CBC W/AUTO DIFF WBC: CPT

## 2020-02-11 PROCEDURE — 71045 X-RAY EXAM CHEST 1 VIEW: CPT

## 2020-02-11 PROCEDURE — 84484 ASSAY OF TROPONIN QUANT: CPT

## 2020-02-11 PROCEDURE — 80053 COMPREHEN METABOLIC PANEL: CPT

## 2020-02-11 PROCEDURE — 93005 ELECTROCARDIOGRAM TRACING: CPT | Performed by: EMERGENCY MEDICINE

## 2020-02-11 PROCEDURE — 83690 ASSAY OF LIPASE: CPT
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
## 2020-02-11 NOTE — ED PROVIDER NOTES
"ED Provider Note    CHIEF COMPLAINT  Chief Complaint   Patient presents with   • Cough   • Fatigue   • Shoulder Pain       HPI  Zuhair Jj is a 49 y.o. male who presents with generalized malaise and a cough.  The patient states he has been sick over the last week.  He states he has no energy.  He does not have any focal weakness.  He has had a cough.  He is also had some left shoulder pain.  He states he does have a history of hypertension.  He states he is had a cardiac stress test recently that was negative.  He does have diabetes myelitis as well.  The patient has had some nausea but no vomiting.  He has had some anorexia.    REVIEW OF SYSTEMS  See HPI for further details. All other systems are negative.     PAST MEDICAL HISTORY  Past Medical History:   Diagnosis Date   • Asthma    • Chest pain    • Hypertension    • Overweight    • Rheumatic fever with cardiac involvement     patient cant recall but thinks there was a \"tear\" in his heart   • Sleep apnea        FAMILY HISTORY  [unfilled]    SOCIAL HISTORY  Social History     Socioeconomic History   • Marital status:      Spouse name: Not on file   • Number of children: Not on file   • Years of education: Not on file   • Highest education level: Not on file   Occupational History   • Not on file   Social Needs   • Financial resource strain: Not on file   • Food insecurity:     Worry: Not on file     Inability: Not on file   • Transportation needs:     Medical: Not on file     Non-medical: Not on file   Tobacco Use   • Smoking status: Never Smoker   • Smokeless tobacco: Never Used   Substance and Sexual Activity   • Alcohol use: No   • Drug use: No   • Sexual activity: Yes     Partners: Female   Lifestyle   • Physical activity:     Days per week: Not on file     Minutes per session: Not on file   • Stress: Not on file   Relationships   • Social connections:     Talks on phone: Not on file     Gets together: Not on file     Attends Orthodox service: Not " "on file     Active member of club or organization: Not on file     Attends meetings of clubs or organizations: Not on file     Relationship status: Not on file   • Intimate partner violence:     Fear of current or ex partner: Not on file     Emotionally abused: Not on file     Physically abused: Not on file     Forced sexual activity: Not on file   Other Topics Concern   • Not on file   Social History Narrative   • Not on file       SURGICAL HISTORY  Past Surgical History:   Procedure Laterality Date   • OTHER      jaw reconstruction       CURRENT MEDICATIONS  Home Medications    **Home medications have not yet been reviewed for this encounter**         ALLERGIES  Allergies   Allergen Reactions   • Pcn [Penicillins] Anaphylaxis       PHYSICAL EXAM  VITAL SIGNS: /86   Pulse 100   Temp 36.8 °C (98.2 °F) (Oral)   Resp 20   Ht 1.727 m (5' 8\")   Wt 101.5 kg (223 lb 12.3 oz)   SpO2 97%   BMI 34.02 kg/m²       Constitutional: Patient appears ill but nontoxic.   HENT: Normocephalic, Atraumatic, Bilateral external ears normal, Oropharynx moist, No oral exudates, Nose normal.   Eyes: PERRLA, EOMI, Conjunctiva normal, No discharge.   Neck: Normal range of motion, No tenderness, Supple, No stridor.   Lymphatic: No lymphadenopathy noted.   Cardiovascular: Tachycardic heart rate, irregular irregular rhythm, No murmurs, No rubs, No gallops.   Thorax & Lungs: Normal breath sounds, No respiratory distress, No wheezing, No chest tenderness.   Abdomen: Bowel sounds normal, Soft, No tenderness, No masses, No pulsatile masses.   Skin: Warm, Dry, No erythema, No rash.   Back: No tenderness, No CVA tenderness.   Extremities: Intact distal pulses, No edema, No tenderness, No cyanosis, No clubbing.   Neurologic: Alert & oriented x 3, Normal motor function, Normal sensory function, No focal deficits noted.   Psychiatric: Affect normal, Judgment normal, Mood normal.     Results for orders placed or performed during the hospital " encounter of 02/10/20   CBC WITH DIFFERENTIAL   Result Value Ref Range    WBC 7.6 4.8 - 10.8 K/uL    RBC 4.90 4.70 - 6.10 M/uL    Hemoglobin 16.6 14.0 - 18.0 g/dL    Hematocrit 46.7 42.0 - 52.0 %    MCV 95.3 81.4 - 97.8 fL    MCH 33.9 (H) 27.0 - 33.0 pg    MCHC 35.5 (H) 33.7 - 35.3 g/dL    RDW 45.7 35.9 - 50.0 fL    Platelet Count 187 164 - 446 K/uL    MPV 8.7 (L) 9.0 - 12.9 fL    Neutrophils-Polys 62.90 44.00 - 72.00 %    Lymphocytes 21.40 (L) 22.00 - 41.00 %    Monocytes 13.10 0.00 - 13.40 %    Eosinophils 1.50 0.00 - 6.90 %    Basophils 0.70 0.00 - 1.80 %    Immature Granulocytes 0.40 0.00 - 0.90 %    Nucleated RBC 0.00 /100 WBC    Neutrophils (Absolute) 4.78 1.82 - 7.42 K/uL    Lymphs (Absolute) 1.62 1.00 - 4.80 K/uL    Monos (Absolute) 0.99 (H) 0.00 - 0.85 K/uL    Eos (Absolute) 0.11 0.00 - 0.51 K/uL    Baso (Absolute) 0.05 0.00 - 0.12 K/uL    Immature Granulocytes (abs) 0.03 0.00 - 0.11 K/uL    NRBC (Absolute) 0.00 K/uL   COMP METABOLIC PANEL   Result Value Ref Range    Sodium 140 135 - 145 mmol/L    Potassium 3.7 3.6 - 5.5 mmol/L    Chloride 103 96 - 112 mmol/L    Co2 23 20 - 33 mmol/L    Anion Gap 14.0 (H) 0.0 - 11.9    Glucose 104 (H) 65 - 99 mg/dL    Bun 16 8 - 22 mg/dL    Creatinine 0.84 0.50 - 1.40 mg/dL    Calcium 8.9 8.5 - 10.5 mg/dL    AST(SGOT) 34 12 - 45 U/L    ALT(SGPT) 40 2 - 50 U/L    Alkaline Phosphatase 47 30 - 99 U/L    Total Bilirubin 0.5 0.1 - 1.5 mg/dL    Albumin 4.8 3.2 - 4.9 g/dL    Total Protein 7.3 6.0 - 8.2 g/dL    Globulin 2.5 1.9 - 3.5 g/dL    A-G Ratio 1.9 g/dL   LIPASE   Result Value Ref Range    Lipase 34 11 - 82 U/L   TROPONIN   Result Value Ref Range    Troponin T <6 6 - 19 ng/L   ESTIMATED GFR   Result Value Ref Range    GFR If African American >60 >60 mL/min/1.73 m 2    GFR If Non African American >60 >60 mL/min/1.73 m 2   ACCU-CHEK GLUCOSE   Result Value Ref Range    Glucose - Accu-Ck 131 (H) 65 - 99 mg/dL   EKG (NOW)   Result Value Ref Range    Report       [facility]  [facility] Emergency Dept.    Test Date:  2020  Pt Name:    JERI SIMMONS                Department: ER  MRN:        [id number]                      Room:       [facility]  Gender:     Male                         Technician: [id number]  :        1970                   Requested By:IBAN THOMPSON  Order #:    [id number]                    Reading MD: IBAN THOMPSON MD    Measurements  Intervals                                Axis  Rate:       103                          P:          34  KY:         168                          QRS:        41  QRSD:       94                           T:          52  QT:         320  QTc:        419    Interpretive Statements  Twelve-lead EKG shows a sinus tachycardia with frequent premature ventricular    contractions but no sustained runs of ventricular beats.  The native beats do    not show any evidence of ST segment elevation nor depression and abnormal T  waves.  Electronically Signed On 2020 1:28:34 PST by IBAN THOMPSON MD         RADIOLOGY/PROCEDURES  DX-CHEST-PORTABLE (1 VIEW)   Final Result         1.  No acute cardiopulmonary disease.            COURSE & MEDICAL DECISION MAKING  Pertinent Labs & Imaging studies reviewed. (See chart for details)  This a 49-year-old male who presents the emergency department with generalized malaise, cough, and shoulder pain.  The patient's EKG does show premature contractions but no evidence of ischemia.  He states that he is had a recent stress test that was negative.  Based on the patient's presenting symptoms I suspect this is all from a viral process.  Chest x-ray does not show any evidence of pneumonia.  The laboratory analysis is reassuring as the patient is not acidotic nor does he have a significant leukocytosis.  He did have a troponin ordered in triage via protocol which is negative and again this does not sound cardiac in nature.  At the time of discharge the patient continues to maintain a nontoxic state.   I suspect this is all from a viral process.  The patient will take antipyretics and drink lots of fluids.  The patient will return to the emerge department if he is acutely worse.    FINAL IMPRESSION  1.  Viral illness    Disposition  The patient will be discharged in stable condition    Electronically signed by: Trevor Paulino M.D., 2/11/2020 12:28 AM

## 2020-02-11 NOTE — ED NOTES
Patient awake alert and oriented x 4, [place] 15, bed in low position, call light within reach, on room air, unlabored breathing noted, no cough noted, interacts with staff, interactions noted as appropriate.

## 2020-02-11 NOTE — ED NOTES
Patient verbalized understanding of discharge instructions, provided with discharge paperwork, gait steady, ambulated independently to NIALL gómez.

## 2020-02-12 ENCOUNTER — OFFICE VISIT (OUTPATIENT)
Dept: MEDICAL GROUP | Facility: CLINIC | Age: 50
End: 2020-02-12
Payer: COMMERCIAL

## 2020-02-12 VITALS
OXYGEN SATURATION: 95 % | SYSTOLIC BLOOD PRESSURE: 146 MMHG | HEART RATE: 46 BPM | BODY MASS INDEX: 34.69 KG/M2 | HEIGHT: 67 IN | DIASTOLIC BLOOD PRESSURE: 72 MMHG | TEMPERATURE: 97 F | WEIGHT: 221 LBS

## 2020-02-12 DIAGNOSIS — M25.512 ACUTE PAIN OF LEFT SHOULDER: ICD-10-CM

## 2020-02-12 DIAGNOSIS — J00 ACUTE NASOPHARYNGITIS: ICD-10-CM

## 2020-02-12 PROCEDURE — 99213 OFFICE O/P EST LOW 20 MIN: CPT | Performed by: PHYSICIAN ASSISTANT

## 2020-02-12 ASSESSMENT — PATIENT HEALTH QUESTIONNAIRE - PHQ9
CLINICAL INTERPRETATION OF PHQ2 SCORE: 3
SUM OF ALL RESPONSES TO PHQ QUESTIONS 1-9: 10
5. POOR APPETITE OR OVEREATING: 2 - MORE THAN HALF THE DAYS

## 2020-02-12 NOTE — LETTER
February 12, 2020       Patient: Zuhair Jj   YOB: 1970   Date of Visit: 2/12/2020         To Whom It May Concern:    It is my medical opinion that Zuhair Jj work no more than 12 hours per 24 hour period.    If you have any questions or concerns, please don't hesitate to call [phone].          Sincerely,          Tasha Wyatt P.A.-C.

## 2020-02-12 NOTE — LETTER
February 12, 2020       Patient: Zuhair Jj   YOB: 1970   Date of Visit: 2/12/2020         To Whom It May Concern:    It is my medical opinion that Zuhair Jj has been acutely ill and return to work on 20/17/2020.    If you have any questions or concerns, please don't hesitate to call [phone].          Sincerely,          Tasha Wyatt P.A.-C.

## 2020-02-12 NOTE — LETTER
February 12, 2020       Patient: Zuhair Jj   YOB: 1970   Date of Visit: 2/12/2020         To Whom It May Concern:    It is my medical opinion that Zuhair Jj has been acutely ill and return to work on 02/17/2020.      If you have any questions or concerns, please don't hesitate to call [phone].          Sincerely,          Tasha Wyatt P.A.-C.

## 2020-02-13 PROBLEM — J00 ACUTE NASOPHARYNGITIS: Status: ACTIVE | Noted: 2020-02-13
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
## 2020-02-13 NOTE — ASSESSMENT & PLAN NOTE
varun has been ill for the past 2 weeks, and continues to have some URI symptoms with improvement recently.

## 2020-02-16 ENCOUNTER — TELEPHONE (OUTPATIENT)
Dept: PULMONOLOGY | Facility: HOSPICE | Age: 50
End: 2020-02-16

## 2020-02-16 RX ORDER — AZITHROMYCIN 250 MG/1
TABLET, FILM COATED ORAL
Qty: 6 TAB | Refills: 0 | Status: SHIPPED
Start: 2020-02-16 | End: 2020-03-24

## 2020-02-16 NOTE — TELEPHONE ENCOUNTER
Called by patient regarding worsening cough with yellow phlegm and subjective fever/chills. He had a recent ED visit on 02/10/2020 for a cough presumed to be related to viral infection. Given yellow phlegm and subjective fever, will start a course of Z-pack. Patient is to seek ED if cough or fever/chills worsen. He endorsed understanding and amenable to plan.    Naun Hemphill MD   Pulmonary Critical Care   [facility]

## 2020-03-09 ENCOUNTER — OFFICE VISIT (OUTPATIENT)
Dept: MEDICAL GROUP | Facility: CLINIC | Age: 50
End: 2020-03-09
Payer: COMMERCIAL

## 2020-03-09 VITALS
HEIGHT: 67 IN | SYSTOLIC BLOOD PRESSURE: 132 MMHG | DIASTOLIC BLOOD PRESSURE: 82 MMHG | BODY MASS INDEX: 34.53 KG/M2 | HEART RATE: 65 BPM | WEIGHT: 220 LBS | TEMPERATURE: 98.6 F | RESPIRATION RATE: 16 BRPM | OXYGEN SATURATION: 96 %

## 2020-03-09 DIAGNOSIS — J45.20 MILD INTERMITTENT ASTHMA WITHOUT COMPLICATION: ICD-10-CM

## 2020-03-09 DIAGNOSIS — Z02.71 DISABILITY EXAMINATION: ICD-10-CM

## 2020-03-09 DIAGNOSIS — I49.3 FREQUENT UNIFOCAL PVCS: ICD-10-CM

## 2020-03-09 DIAGNOSIS — I10 ESSENTIAL HYPERTENSION: ICD-10-CM

## 2020-03-09 DIAGNOSIS — M25.512 ACUTE PAIN OF LEFT SHOULDER: ICD-10-CM

## 2020-03-09 PROCEDURE — 99214 OFFICE O/P EST MOD 30 MIN: CPT | Performed by: PHYSICIAN ASSISTANT

## 2020-03-09 RX ORDER — MELOXICAM 15 MG/1
TABLET ORAL
COMMUNITY
Start: 2020-02-13 | End: 2020-03-24

## 2020-03-09 ASSESSMENT — FIBROSIS 4 INDEX: FIB4 SCORE: 1.466146915168975872

## 2020-03-09 NOTE — ASSESSMENT & PLAN NOTE
Continue lisinopril-hydrochlorothiazide every morning as prescribed. 10 min spent in discussion of disability paperwork.  Patient states he is unable to work for longer than 12 hours without experiencing side effects from elevated blood pressures.  Patient denies chest pain or shortness of breath unless working for greater than 12 hours.

## 2020-03-09 NOTE — ASSESSMENT & PLAN NOTE
Discovered on EKG and per cardiology this is not problematic.  The patient states he does feel these and does have some shortness of breath when they occur.  He claims that these make it difficult for him to breathe while running
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
## 2020-03-09 NOTE — ASSESSMENT & PLAN NOTE
This patient claims today that his work is forcing him into early correction due to poor physical condition.  For many years now, they have made an exception and allowing him to continue to work as a  but given him the option to not work overtime in order to maintain a normal blood pressure.  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  He is requesting paperwork to be completed which has been scanned into his chart.

## 2020-03-19 ENCOUNTER — TELEPHONE (OUTPATIENT)
Dept: MEDICAL GROUP | Facility: CLINIC | Age: 50
End: 2020-03-19

## 2020-03-19 DIAGNOSIS — I10 ESSENTIAL HYPERTENSION: ICD-10-CM

## 2020-03-19 DIAGNOSIS — I49.3 FREQUENT UNIFOCAL PVCS: ICD-10-CM

## 2020-03-19 NOTE — TELEPHONE ENCOUNTER
VOICEMAIL  1. Caller Name: Zuhair S Garrison                        Call Back Number: [phone] (home)       2. Message: Patients shelter would like him to see Cardiologist, he needs a referral     3. Patient approves office to leave a detailed voicemail/MyChart message: yes

## 2020-03-19 NOTE — TELEPHONE ENCOUNTER
This is curious. The patient has been telling me that he has been seeing a cardiologist as is documented at his last visit when he said that his cardiologist told him his PVC's were not problematic. I am happy to place a referral.

## 2020-03-24 ENCOUNTER — OFFICE VISIT (OUTPATIENT)
Dept: CARDIOLOGY | Facility: MEDICAL CENTER | Age: 50
End: 2020-03-24
Payer: COMMERCIAL

## 2020-03-24 VITALS
WEIGHT: 224.65 LBS | OXYGEN SATURATION: 93 % | HEIGHT: 67 IN | SYSTOLIC BLOOD PRESSURE: 148 MMHG | BODY MASS INDEX: 35.26 KG/M2 | DIASTOLIC BLOOD PRESSURE: 90 MMHG | HEART RATE: 86 BPM

## 2020-03-24 DIAGNOSIS — I10 ESSENTIAL HYPERTENSION: ICD-10-CM

## 2020-03-24 DIAGNOSIS — I49.3 PVC'S (PREMATURE VENTRICULAR CONTRACTIONS): ICD-10-CM

## 2020-03-24 DIAGNOSIS — R06.09 DYSPNEA ON EXERTION: ICD-10-CM

## 2020-03-24 DIAGNOSIS — E78.2 MIXED HYPERLIPIDEMIA: ICD-10-CM

## 2020-03-24 LAB — EKG IMPRESSION: NORMAL

## 2020-03-24 PROCEDURE — 93000 ELECTROCARDIOGRAM COMPLETE: CPT | Performed by: INTERNAL MEDICINE

## 2020-03-24 PROCEDURE — 99215 OFFICE O/P EST HI 40 MIN: CPT | Performed by: INTERNAL MEDICINE

## 2020-03-24 RX ORDER — METOPROLOL SUCCINATE 25 MG/1
25 TABLET, EXTENDED RELEASE ORAL DAILY
Qty: 90 TAB | Refills: 3 | Status: SHIPPED | OUTPATIENT
Start: 2020-03-24 | End: 2020-03-30 | Stop reason: SDUPTHER

## 2020-03-24 RX ORDER — LISINOPRIL AND HYDROCHLOROTHIAZIDE 25; 20 MG/1; MG/1
2 TABLET ORAL DAILY
Qty: 180 TAB | Refills: 3 | Status: SHIPPED | OUTPATIENT
Start: 2020-03-24 | End: 2020-11-17 | Stop reason: SDUPTHER

## 2020-03-24 ASSESSMENT — ENCOUNTER SYMPTOMS
DECREASED APPETITE: 0
SHORTNESS OF BREATH: 0
CONSTIPATION: 0
HEARTBURN: 0
DYSPNEA ON EXERTION: 1
BLURRED VISION: 0
WEIGHT LOSS: 0
FLANK PAIN: 0
SYNCOPE: 0
DIARRHEA: 0
WEIGHT GAIN: 0
ABDOMINAL PAIN: 0
BACK PAIN: 0
FEVER: 0
PALPITATIONS: 0
PND: 0
DEPRESSION: 0
ALTERED MENTAL STATUS: 0
VOMITING: 0
CLAUDICATION: 0
COUGH: 0
ORTHOPNEA: 0
NEAR-SYNCOPE: 0
IRREGULAR HEARTBEAT: 0
NAUSEA: 0
DIZZINESS: 0

## 2020-03-24 ASSESSMENT — FIBROSIS 4 INDEX: FIB4 SCORE: 1.466146915168975872
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
## 2020-03-25 ENCOUNTER — OFFICE VISIT (OUTPATIENT)
Dept: PULMONOLOGY | Facility: HOSPICE | Age: 50
End: 2020-03-25
Payer: COMMERCIAL

## 2020-03-25 ENCOUNTER — TELEPHONE (OUTPATIENT)
Dept: CARDIOLOGY | Facility: MEDICAL CENTER | Age: 50
End: 2020-03-25

## 2020-03-25 ENCOUNTER — NON-PROVIDER VISIT (OUTPATIENT)
Dept: CARDIOLOGY | Facility: MEDICAL CENTER | Age: 50
End: 2020-03-25
Payer: COMMERCIAL

## 2020-03-25 VITALS
WEIGHT: 220 LBS | OXYGEN SATURATION: 92 % | DIASTOLIC BLOOD PRESSURE: 82 MMHG | RESPIRATION RATE: 16 BRPM | SYSTOLIC BLOOD PRESSURE: 144 MMHG | HEART RATE: 82 BPM | HEIGHT: 67 IN | BODY MASS INDEX: 34.53 KG/M2

## 2020-03-25 DIAGNOSIS — J45.20 MILD INTERMITTENT ASTHMA WITHOUT COMPLICATION: ICD-10-CM

## 2020-03-25 DIAGNOSIS — G47.33 OSA (OBSTRUCTIVE SLEEP APNEA): ICD-10-CM

## 2020-03-25 DIAGNOSIS — E66.9 OBESITY (BMI 30-39.9): ICD-10-CM

## 2020-03-25 DIAGNOSIS — R07.9 CHEST PAIN, UNSPECIFIED TYPE: ICD-10-CM

## 2020-03-25 DIAGNOSIS — I49.3 PVC'S (PREMATURE VENTRICULAR CONTRACTIONS): ICD-10-CM

## 2020-03-25 PROCEDURE — 99214 OFFICE O/P EST MOD 30 MIN: CPT | Performed by: INTERNAL MEDICINE

## 2020-03-25 PROCEDURE — 93268 ECG RECORD/REVIEW: CPT | Performed by: INTERNAL MEDICINE

## 2020-03-25 ASSESSMENT — ENCOUNTER SYMPTOMS
EYE DISCHARGE: 0
CHILLS: 0
DIARRHEA: 0
WEIGHT LOSS: 0
BACK PAIN: 0
PALPITATIONS: 0
DIAPHORESIS: 0
BLURRED VISION: 0
COUGH: 0
FALLS: 0
SPEECH CHANGE: 0
ORTHOPNEA: 0
PND: 0
WHEEZING: 0
SPUTUM PRODUCTION: 0
DEPRESSION: 0
CONSTIPATION: 0
PHOTOPHOBIA: 0
FOCAL WEAKNESS: 0
TREMORS: 0
HEARTBURN: 0
HEADACHES: 0
DOUBLE VISION: 0
NAUSEA: 0
DIZZINESS: 0
VOMITING: 0
STRIDOR: 0
EYE PAIN: 0
WEAKNESS: 0
FEVER: 0
SORE THROAT: 0
HEMOPTYSIS: 0
SHORTNESS OF BREATH: 1
CLAUDICATION: 0
SINUS PAIN: 0
NECK PAIN: 0
ABDOMINAL PAIN: 0
EYE REDNESS: 0
MYALGIAS: 0

## 2020-03-25 ASSESSMENT — FIBROSIS 4 INDEX: FIB4 SCORE: 1.466146915168975872

## 2020-03-25 NOTE — TELEPHONE ENCOUNTER
Patient enrolled in the 3 day [employer] Heart monitoring program per Dr. Amaro.  Monitor to be mailed to patient by [facility].>Pending Baseline.  >Pending EOS.
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
## 2020-03-30 ENCOUNTER — HOSPITAL ENCOUNTER (OUTPATIENT)
Dept: RADIOLOGY | Facility: MEDICAL CENTER | Age: 50
End: 2020-03-30
Attending: INTERNAL MEDICINE
Payer: COMMERCIAL

## 2020-03-30 ENCOUNTER — TELEPHONE (OUTPATIENT)
Dept: CARDIOLOGY | Facility: MEDICAL CENTER | Age: 50
End: 2020-03-30

## 2020-03-30 DIAGNOSIS — R06.09 DYSPNEA ON EXERTION: ICD-10-CM

## 2020-03-30 PROCEDURE — A9502 TC99M TETROFOSMIN: HCPCS

## 2020-03-30 PROCEDURE — 93018 CV STRESS TEST I&R ONLY: CPT | Performed by: INTERNAL MEDICINE

## 2020-03-30 PROCEDURE — 78452 HT MUSCLE IMAGE SPECT MULT: CPT | Mod: 26 | Performed by: INTERNAL MEDICINE

## 2020-03-30 RX ORDER — METOPROLOL SUCCINATE 25 MG/1
50 TABLET, EXTENDED RELEASE ORAL DAILY
Qty: 90 TAB | Refills: 3 | COMMUNITY
Start: 2020-03-30 | End: 2020-04-10

## 2020-03-30 NOTE — TELEPHONE ENCOUNTER
Riccardo Amaro M.D.  Gail Richards R.N.             Wellington Reynolds,   Can you let Mr Jj know that his nuclear stress test did not suggest any significant coronary disease? And if he is taking his metoprolol and tolerating, can we titrate up to 50mg daily? He has a lot of ventricular ectopy?     Called pt and discussed stress test results and recommendations to increase metoprolol for ventricular ectopy. He has been tolerating 25mg of metoprolol ok and says that the lowest his BP has been is 110/60. Med list updated. He will f/u as planned on 4/10/20.

## 2020-04-01 NOTE — TELEPHONE ENCOUNTER
Returned call. Ever since increasing metoprolol dose to 50mg daily on 3/30/20, pt noticed some mild diarrhea, dizziness, and drowsiness. He says that it is tolerable, but he wanted to let us know. Since it has only been a couple days since increasing the dose, he is fine with continuing as is until f/u appt with [facility] on 4/10/20. He is hoping symptoms will resolve by then. If not, advised pt that he should go back to 25mg daily dose if symptoms become any worse. He reports that his BP has been 140s/80s the last couple days.

## 2020-04-01 NOTE — TELEPHONE ENCOUNTER
SUNDAY/roberta    Pt calling to report a little drowsy, a little dizzy and some diarrhea for last few days and believes it is from the metoprolol.  These issues have developed since you spoke with him on 3/30.    Please call Zuhair

## 2020-04-02 ENCOUNTER — OFFICE VISIT (OUTPATIENT)
Dept: MEDICAL GROUP | Facility: CLINIC | Age: 50
End: 2020-04-02
Payer: COMMERCIAL

## 2020-04-02 DIAGNOSIS — Z02.71 DISABILITY EXAMINATION: ICD-10-CM

## 2020-04-02 PROCEDURE — 99211 OFF/OP EST MAY X REQ PHY/QHP: CPT | Performed by: PHYSICIAN ASSISTANT

## 2020-04-02 NOTE — PROGRESS NOTES
"Chief Complaint   Patient presents with   • Paperwork       HISTORY OF PRESENT ILLNESS: Patient is a 49 y.o. male established patient who presents today for evaluation and management of:    Disability examination  This patient presents for \"catastrophic leave\" paperwork. He states that he hasn't gone back to work since February 24th 2020 due to illness and because he doesn't feel he can work the overtime hours that are requested of him. He does not have a catastrophic illness however, his paperwork is completed because part of the reason he feels he has not gone back to work is his asthma. He also presents with a list of his essential job duties and requests that this is signed by myself confirming that he is unable to perform these functions. I did not sign this second form because, this patient knows that he is a . He is aware that because of his job he needs to remain physically fit. It seems to me that many of his ailments and the reason he can no longer complete these physically demanding functions is due to deconditioning. I explain to the patient today that had he remained physically fit, eaten healthy food in correct portion sizes and taken better care of his body he would likely still be able to complete his job functions and that as he continues to live a sedentary lifestyle he only becomes more unable to do his job. He has claimed for a couple of years that he has an arrhythmia and that he has hypertension that prevent him from working long hours but his arrhythmia has recently been shown to be PVC's, which are not life-threatening and are being further evaluated by cardiology. His asthma has been evaluated by pulmonology recently and he is deemed to be fit if he uses his medication correctly. I do not believe this qualifies him to be receiving disability and no longer wish to complete any further paperwork for his medical halfway or leave from work.        Patient Active Problem " List    Diagnosis Date Noted   • Dyspnea on exertion 2020   • PVC's (premature ventricular contractions) 2020   • Disability examination 2020   • Acute nasopharyngitis 2020   • Acute pain of left shoulder 2020   • Mixed hyperlipidemia 2020   • Sprain of medial collateral ligament of left knee 2019   • Type 2 diabetes mellitus with complication, without long-term current use of insulin (HCC) 2018   • Obesity (BMI 30-39.9) 2018   • Elevated cortisol level 2018   • Frequent unifocal PVCs 2018   • Mild intermittent asthma without complication 10/06/2016   • MAGDALENA (obstructive sleep apnea) 10/06/2016   • Essential hypertension 10/06/2016       Allergies:Pcn [penicillins]    Current Outpatient Medications   Medication Sig Dispense Refill   • metoprolol SR (TOPROL XL) 25 MG TABLET SR 24 HR Take 2 Tabs by mouth every day. 90 Tab 3   • lisinopril-hydrochlorothiazide (PRINZIDE) 20-25 MG per tablet Take 2 Tabs by mouth every day. 180 Tab 3   • simvastatin (ZOCOR) 10 MG Tab Take 1 Tab by mouth every evening. 90 Tab 3   • metFORMIN ER (GLUCOPHAGE XR) 750 MG TABLET SR 24 HR Take 1 Tab by mouth every day. 90 Tab 2   • aspirin 81 MG EC tablet Take 81 mg by mouth every day at 6 PM.       No current facility-administered medications for this visit.        Social History     Tobacco Use   • Smoking status: Never Smoker   • Smokeless tobacco: Never Used   Substance Use Topics   • Alcohol use: No   • Drug use: No       Family Status   Relation Name Status   • Fa     • Mo     • Bro  Other   • Bro  Other   • Child  Alive     Family History   Problem Relation Age of Onset   • Cancer Father    • Heart Failure Mother    • No Known Problems Child        Review of Systems: See HPI above.   Constitutional: Negative for fever, chills, weight loss and malaise.   HENT: Negative for ear pain, nosebleeds, congestion, sore throat and neck pain.    Eyes: Negative for  blurred vision.   Respiratory: Negative for shortness of breath, cough, sputum production and wheezing.    Cardiovascular: Negative for chest pain, palpitations, orthopnea and leg swelling.   Gastrointestinal: Negative for heartburn, nausea, vomiting and abdominal pain.   Genitourinary: Negative for dysuria, urgency and frequency.   Musculoskeletal: Negative for myalgias, back pain and positive for shoulder pain.       Exam:  There were no vitals taken for this visit.  There is no height or weight on file to calculate BMI.  General:  Obese male in NAD  Head: is grossly normal.  Neck: Supple without masses. Thyroid is not visibly enlarged.  Pulmonary:  Normal effort.  Cardiovascular: Carotid pulses are intact and equal bilaterally.  Extremities: no clubbing, cyanosis, or edema.  Due to COVID-19 precautions, no physical contact occurred between myself and this patient.     Medical decision-making and discussion:  1. Disability examination  See HPI above, I refuse to complete any further paperwork for this patient regarding his wish to receive disability from work.       Please note that this dictation was created using voice recognition software. I have made every reasonable attempt to correct obvious errors, but I expect that there are errors of grammar and possibly content that I did not discover before finalizing the note.

## 2020-04-02 NOTE — ASSESSMENT & PLAN NOTE
"This patient presents for \"catastrophic leave\" paperwork. He states that he hasn't gone back to work since February 24th 2020 due to illness and because he doesn't feel he can work the overtime hours that are requested of him. He does not have a catastrophic illness however, his paperwork is completed because part of the reason he feels he has not gone back to work is his asthma. He also presents with a list of his essential job duties and requests that this is signed by myself confirming that he is unable to perform these functions. I did not sign this second form because, this patient knows that he is a . He is aware that because of his job he needs to remain physically fit. It seems to me that many of his ailments and the reason he can no longer complete these physically demanding functions is due to deconditioning. I explain to the patient today that had he remained physically fit, eaten healthy food in correct portion sizes and taken better care of his body he would likely still be able to complete his job functions and that as he continues to live a sedentary lifestyle he only becomes more unable to do his job. He has claimed for a couple of years that he has an arrhythmia and that he has hypertension that prevent him from working long hours but his arrhythmia has recently been shown to be PVC's, which are not life-threatening and are being further evaluated by cardiology. His asthma has been evaluated by pulmonology recently and he is deemed to be fit if he uses his medication correctly. I do not believe this qualifies him to be receiving disability and no longer wish to complete any further paperwork for his medical custodial or leave from work.   "

## 2020-04-06 DIAGNOSIS — I49.3 PVC'S (PREMATURE VENTRICULAR CONTRACTIONS): ICD-10-CM

## 2020-04-09 ENCOUNTER — TELEPHONE (OUTPATIENT)
Dept: CARDIOLOGY | Facility: MEDICAL CENTER | Age: 50
End: 2020-04-09

## 2020-04-09 NOTE — TELEPHONE ENCOUNTER
Pt returned call. He thinks that he can complete a virtual visit because his son will be able to help him. He requests that the instructions be emailed. Emailed mobile and PC instructions to pt's email: [email or web address]. Changed appt to on demand. He has paperwork that he needs filled out stating that he is unable to complete 12 hour shifts. Advised that this paperwork needs to be processed by our PARS, and that he will need to complete an SHRUTHI and pay a fee. Also explained that we ask for 7-10 business days for paperwork. He will respond to the email with his paperwork, and he understands that he will be contacted by a PAR for further processing.

## 2020-04-09 NOTE — TELEPHONE ENCOUNTER
Ivis Richards R.N.             Wellington Reynolds, patient is scheduled to see Dr. Amaro tomorrow but has questions about his paperwork (sorry I don't know what kind of paperwork). This might be something that can be handled without having to have an appointment. Can you call him? If he needs to come in we can leave is appointment scheduled with Dr. Amaro tomorrow. Thank you!!      FINESSEM with pt at [phone] requesting call back to discuss paperwork.

## 2020-04-10 ENCOUNTER — TELEMEDICINE (OUTPATIENT)
Dept: CARDIOLOGY | Facility: MEDICAL CENTER | Age: 50
End: 2020-04-10
Payer: COMMERCIAL

## 2020-04-10 VITALS
HEART RATE: 82 BPM | WEIGHT: 220 LBS | HEIGHT: 67 IN | BODY MASS INDEX: 34.53 KG/M2 | DIASTOLIC BLOOD PRESSURE: 82 MMHG | SYSTOLIC BLOOD PRESSURE: 143 MMHG

## 2020-04-10 DIAGNOSIS — I49.3 PVC'S (PREMATURE VENTRICULAR CONTRACTIONS): ICD-10-CM

## 2020-04-10 DIAGNOSIS — I49.3 FREQUENT UNIFOCAL PVCS: ICD-10-CM

## 2020-04-10 DIAGNOSIS — I10 ESSENTIAL HYPERTENSION: ICD-10-CM

## 2020-04-10 DIAGNOSIS — E11.8 TYPE 2 DIABETES MELLITUS WITH COMPLICATION, WITHOUT LONG-TERM CURRENT USE OF INSULIN (HCC): ICD-10-CM

## 2020-04-10 DIAGNOSIS — E78.2 MIXED HYPERLIPIDEMIA: ICD-10-CM

## 2020-04-10 PROCEDURE — 99215 OFFICE O/P EST HI 40 MIN: CPT | Mod: 95,CR | Performed by: INTERNAL MEDICINE

## 2020-04-10 RX ORDER — METOPROLOL SUCCINATE 100 MG/1
100 TABLET, EXTENDED RELEASE ORAL DAILY
Qty: 90 TAB | Refills: 3 | Status: SHIPPED
Start: 2020-04-10 | End: 2020-04-10

## 2020-04-10 RX ORDER — METOPROLOL SUCCINATE 50 MG/1
50 TABLET, EXTENDED RELEASE ORAL DAILY
Qty: 90 TAB | Refills: 3 | Status: SHIPPED
Start: 2020-04-10 | End: 2020-08-06

## 2020-04-10 RX ORDER — AMLODIPINE BESYLATE 5 MG/1
5 TABLET ORAL DAILY
Qty: 90 TAB | Refills: 3 | Status: SHIPPED
Start: 2020-04-10 | End: 2020-08-06

## 2020-04-10 ASSESSMENT — ENCOUNTER SYMPTOMS
NEAR-SYNCOPE: 0
SHORTNESS OF BREATH: 0
NAUSEA: 0
VOMITING: 0
CONSTIPATION: 0
ORTHOPNEA: 0
DEPRESSION: 0
WEIGHT LOSS: 0
IRREGULAR HEARTBEAT: 0
BLURRED VISION: 0
FLANK PAIN: 0
COUGH: 0
SYNCOPE: 0
PND: 0
ALTERED MENTAL STATUS: 0
HEARTBURN: 0
CLAUDICATION: 0
WEIGHT GAIN: 0
DYSPNEA ON EXERTION: 1
DECREASED APPETITE: 0
DIARRHEA: 0
BACK PAIN: 0
PALPITATIONS: 0
FEVER: 0
ABDOMINAL PAIN: 0
DIZZINESS: 0

## 2020-04-10 ASSESSMENT — FIBROSIS 4 INDEX: FIB4 SCORE: 1.466146915168975872

## 2020-04-10 NOTE — PROGRESS NOTES
"This encounter was conducted via ZOOM.  Verbal consent was obtained. Patient's identity was verified.    Cardiology Note    Chief Complaint   Patient presents with   • Premature Ventricular Contractions (PVCs)       History of Present Illness: Zuhair Jj is a 49 y.o. male PMH MAGDALENA on cpap, asthma, HTN, PVCs, DM2, HLD, obesity who presents for follow up.    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 He is very stressed due to inability to meet demand of 16h day due to physical strain; he states he can do 12h. He does say wakes during the night with dyspnea despite wearing cpap mask. Describes home blood pressure 140s/60-80s. Palpitations daily. Probably fewer in number, but persisting.     Review of Systems   Constitution: Negative for decreased appetite, fever, malaise/fatigue, weight gain and weight loss.   HENT: Negative for congestion and nosebleeds.    Eyes: Negative for blurred vision.   Cardiovascular: Positive for chest pain and dyspnea on exertion. Negative for claudication, irregular heartbeat, leg swelling, near-syncope, orthopnea, palpitations, paroxysmal nocturnal dyspnea and syncope.   Respiratory: Negative for cough and shortness of breath.    Endocrine: Negative for cold intolerance and heat intolerance.   Skin: Negative for rash.   Musculoskeletal: Negative for back pain.   Gastrointestinal: Negative for abdominal pain, constipation, diarrhea, heartburn, melena, nausea and vomiting.   Genitourinary: Negative for dysuria, flank pain and hematuria.   Neurological: Negative for dizziness.   Psychiatric/Behavioral: Negative for altered mental status and depression.         Past Medical History:   Diagnosis Date   • Asthma    • Chest pain    • Hypertension    • Overweight    • Rheumatic fever with cardiac involvement     patient cant recall but thinks there was a \"tear\" in his heart   • Sleep apnea          Past Surgical " History:   Procedure Laterality Date   • OTHER      jaw reconstruction         Current Outpatient Medications   Medication Sig Dispense Refill   • metoprolol SR (TOPROL XL) 50 MG TABLET SR 24 HR Take 1 Tab by mouth every day. 90 Tab 3   • amLODIPine (NORVASC) 5 MG Tab Take 1 Tab by mouth every day. 90 Tab 3   • lisinopril-hydrochlorothiazide (PRINZIDE) 20-25 MG per tablet Take 2 Tabs by mouth every day. 180 Tab 3   • simvastatin (ZOCOR) 10 MG Tab Take 1 Tab by mouth every evening. 90 Tab 3   • metFORMIN ER (GLUCOPHAGE XR) 750 MG TABLET SR 24 HR Take 1 Tab by mouth every day. 90 Tab 2   • aspirin 81 MG EC tablet Take 81 mg by mouth every day at 6 PM.       No current facility-administered medications for this visit.          Allergies   Allergen Reactions   • Pcn [Penicillins] Anaphylaxis         Family History   Problem Relation Age of Onset   • Cancer Father    • Heart Failure Mother    • No Known Problems Child          Social History     Socioeconomic History   • Marital status:      Spouse name: Not on file   • Number of children: Not on file   • Years of education: Not on file   • Highest education level: Not on file   Occupational History   • Not on file   Social Needs   • Financial resource strain: Not on file   • Food insecurity     Worry: Not on file     Inability: Not on file   • Transportation needs     Medical: Not on file     Non-medical: Not on file   Tobacco Use   • Smoking status: Never Smoker   • Smokeless tobacco: Never Used   Substance and Sexual Activity   • Alcohol use: No   • Drug use: No   • Sexual activity: Yes     Partners: Female   Lifestyle   • Physical activity     Days per week: Not on file     Minutes per session: Not on file   • Stress: Not on file   Relationships   • Social connections     Talks on phone: Not on file     Gets together: Not on file     Attends Ten[employer] service: Not on file     Active member of club or organization: Not on file     Attends meetings of clubs or  "organizations: Not on file     Relationship status: Not on file   • Intimate partner violence     Fear of current or ex partner: Not on file     Emotionally abused: Not on file     Physically abused: Not on file     Forced sexual activity: Not on file   Other Topics Concern   • Not on file   Social History Narrative   • Not on file         Physical Exam:  Ambulatory Vitals  /82 (BP Location: Left arm, Patient Position: Sitting, BP Cuff Size: Adult)   Pulse 82   Ht 1.702 m (5' 7\")   Wt 99.8 kg (220 lb)    BP Readings from Last 4 Encounters:   04/10/20 143/82   03/25/20 144/82   03/25/20 136/88   03/24/20 148/90     Weight/BMI:   Vitals:    04/10/20 0948   BP: 143/82   Weight: 99.8 kg (220 lb)   Height: 1.702 m (5' 7\")    Body mass index is 34.46 kg/m².  Wt Readings from Last 4 Encounters:   04/10/20 99.8 kg (220 lb)   03/25/20 99.8 kg (220 lb)   03/25/20 99.8 kg (220 lb)   03/24/20 101.9 kg (224 lb 10.4 oz)     Physical Exam:  Constitutional: Alert, no distress, well-groomed.  Skin: No rashes in visible areas.  Eye: Round. Conjunctiva clear, lids normal. No icterus.   ENMT: Lips pink without lesions, good dentition, moist mucous membranes. Phonation normal.  Neck: No masses, no thyromegaly. Moves freely without pain.  CV: Pulse as reported by patient  Respiratory: Unlabored respiratory effort, no cough or audible wheeze  Psych: Alert and oriented x3, normal affect and mood.     Lab Data Review:  Lab Results   Component Value Date/Time    CHOLSTRLTOT 213 (H) 06/05/2019 07:50 AM    LDL 96 06/05/2019 07:50 AM    HDL 65 06/05/2019 07:50 AM    TRIGLYCERIDE 261 (H) 06/05/2019 07:50 AM       Lab Results   Component Value Date/Time    SODIUM 140 02/11/2020 12:38 AM    POTASSIUM 3.7 02/11/2020 12:38 AM    CHLORIDE 103 02/11/2020 12:38 AM    CO2 23 02/11/2020 12:38 AM    GLUCOSE 104 (H) 02/11/2020 12:38 AM    BUN 16 02/11/2020 12:38 AM    CREATININE 0.84 02/11/2020 12:38 AM     CrCl cannot be calculated (Patient's " most recent lab result is older than the maximum 7 days allowed.).  Lab Results   Component Value Date/Time    ALKPHOSPHAT 47 02/11/2020 12:38 AM    ASTSGOT 34 02/11/2020 12:38 AM    ALTSGPT 40 02/11/2020 12:38 AM    TBILIRUBIN 0.5 02/11/2020 12:38 AM      Lab Results   Component Value Date/Time    WBC 7.6 02/11/2020 12:38 AM     Lab Results   Component Value Date/Time    HBA1C 5.1 11/21/2018 08:47 AM     No components found for: TROP      Cardiac Imaging and Procedures Review:      EKG 3/24/20 interpreted by me sinus, early R transition, frequent unifocal PVCs    treadmill EKG 2016 - no ischemia at 10.1 METS workload.    TTE 7/2018 - mild concentric LVH otherwise normal study    Nuclear stress spect exercise 3/2020   NUCLEAR IMAGING INTERPRETATION   Negative ECG for signs of ischemia at 8.3 METS of exercise.   Baseline hypertensive with normal blood pressure response to exercise.   Normal heart rate response to exercise.   No perfusion evidence of significant jeopardized viable myocardium or prior    myocardial infarction.   Normal left ventricular size, ejection fraction, and wall motion.   Frequent ventricular ectopy.   Little treadmill score +7.5, low risk.   ECG INTERPRETATION   No ischemic changes with stress compared to baseline ECG. Frequent premature    ventricular contractions (PVC) and single vetricular couplet with stress.    Frequent PVC with rest. PVC burden somewhat increased with stress.    biotel 4/2020  Findings:  Underlying rhythm: Limited information. Mostly sinus rhythm. Frequent ventricular ectopy.    Medical Decision Making:  Problem List Items Addressed This Visit     Essential hypertension    Relevant Medications    metoprolol SR (TOPROL XL) 50 MG TABLET SR 24 HR    amLODIPine (NORVASC) 5 MG Tab    Type 2 diabetes mellitus with complication, without long-term current use of insulin (HCC)    Mixed hyperlipidemia    Relevant Medications    metoprolol SR (TOPROL XL) 50 MG TABLET SR 24 HR     amLODIPine (NORVASC) 5 MG Tab    PVC's (premature ventricular contractions)    Relevant Medications    metoprolol SR (TOPROL XL) 50 MG TABLET SR 24 HR    amLODIPine (NORVASC) 5 MG Tab    RESOLVED: Frequent unifocal PVCs    Relevant Medications    metoprolol SR (TOPROL XL) 50 MG TABLET SR 24 HR    amLODIPine (NORVASC) 5 MG Tab        HTN above goal <130/80. Continue prinzide. Add amlodipine. Check BP at home. He is aware of goal and will inform if sustains above.    PVCs. Frequent on EKG and stress. Titrate metoprolol. Instructed as to how to do. He will keep us updated on progress. If does not resolve or reach limit with max metoprolol, can consider antiarrhythmic vs ablation with EP.    HLD/DM. Check annual lipids with PMD. Continue statin. Continue aspirin.    His chest pressure and dyspnea is likely due to combination of factors including cole, obesity, htn, and pvcs. Will attempt to best control cardiac component medically for now. No ischemia/infarct on stress.    It was my pleasure to meet with Mr. Jj.

## 2020-04-16 ENCOUNTER — TELEPHONE (OUTPATIENT)
Dept: CARDIOLOGY | Facility: MEDICAL CENTER | Age: 50
End: 2020-04-16

## 2020-04-16 NOTE — TELEPHONE ENCOUNTER
"VR reviewed pt's \"Certification for Catastrophic Leave Request.\" After reviewing pt's chart he finds no indication to complete catastrophic leave request. 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 Called pt and informed him of this. He thinks he sent us the wrong paperwork. He is going to look and see if he has paperwork that would be more appropriate for recommending a restriction in his shift length. He still has the email address he sent the first set of paperwork to. Incomplete paperwork sent to scanning.   "

## 2020-04-16 NOTE — TELEPHONE ENCOUNTER
VR    Hello, patient would like a call back to go over his medications and to make sure he is taking them correctly, He also would like an update on a medical form he had emailed Xi for his FDC. He would like a call back at [phone]

## 2020-04-16 NOTE — TELEPHONE ENCOUNTER
Returned call. Reviewed med list and dosages with pt, and he has been taking correctly. He said his BP has been 120s-130s/70s-80s, HR 70s-80s. Also advised that VR is rounding in the hospital this week, but when he is able to, he will review and advise on his paperwork.

## 2020-04-20 ENCOUNTER — NON-PROVIDER VISIT (OUTPATIENT)
Dept: CARDIOLOGY | Facility: MEDICAL CENTER | Age: 50
End: 2020-04-20
Payer: COMMERCIAL

## 2020-04-20 DIAGNOSIS — Z02.9 ADMINISTRATIVE ENCOUNTER: Primary | ICD-10-CM

## 2020-05-06 ENCOUNTER — TELEPHONE (OUTPATIENT)
Dept: CARDIOLOGY | Facility: MEDICAL CENTER | Age: 50
End: 2020-05-06

## 2020-05-06 NOTE — TELEPHONE ENCOUNTER
ALBINO Wick,     I spoke with Zuhair a few minutes ago because he had questions about is [place] [employer] paperwork that our office completed for him. I was unable to help answer his questions so I felt that it was necessary to send you a message. Zuhair states that he's getting the run around from a [place] [employer] Physician whose not in agreement about his paperwork and how it was completed. The patient would like to talked to VR and get his opinion on this situation. I told Zuhair that I would relay this information to you.     Zuhair stated that he called our office because he sent Xi Cowan an email over the weekend but didn't get a response obviously because she's no longer working in our office. The patient was made aware of this information.     Called pt to discuss. He explains that the doctor with [employer] thought that additional information is needed regarding his activity restrictions at work. 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 The doctor would like this clarified. Pt will email over this paperwork for further review.

## 2020-05-13 DIAGNOSIS — I49.3 PVC'S (PREMATURE VENTRICULAR CONTRACTIONS): ICD-10-CM

## 2020-05-15 ENCOUNTER — PATIENT MESSAGE (OUTPATIENT)
Dept: CARDIOLOGY | Facility: MEDICAL CENTER | Age: 50
End: 2020-05-15

## 2020-05-18 NOTE — PATIENT COMMUNICATION
D/w VR. VR tried to call ROSALINDA ANGELES doctor last week, but was unable to get a hold of them. He does not think it likely that we will be able to add anything to the previously completed paperwork, but he wanted to s/w ROSALINDA ANGELES doctor first just to be sure. He is ok with us providing ROSALINDA ANGELES office with his cell number for further discussion. Called [phone] and LVM with Brian Jones providing with Dr. Gu's cell phone number and requesting that the medical advisor contact him.

## 2020-07-07 ENCOUNTER — APPOINTMENT (OUTPATIENT)
Dept: SLEEP MEDICINE | Facility: MEDICAL CENTER | Age: 50
End: 2020-07-07
Payer: COMMERCIAL

## 2020-08-06 ENCOUNTER — TELEMEDICINE (OUTPATIENT)
Dept: MEDICAL GROUP | Facility: CLINIC | Age: 50
End: 2020-08-06
Payer: COMMERCIAL

## 2020-08-06 VITALS — SYSTOLIC BLOOD PRESSURE: 120 MMHG | RESPIRATION RATE: 16 BRPM | HEART RATE: 68 BPM | DIASTOLIC BLOOD PRESSURE: 70 MMHG

## 2020-08-06 DIAGNOSIS — R06.09 DYSPNEA ON EXERTION: ICD-10-CM

## 2020-08-06 DIAGNOSIS — I10 ESSENTIAL HYPERTENSION: ICD-10-CM

## 2020-08-06 DIAGNOSIS — I49.3 PVC'S (PREMATURE VENTRICULAR CONTRACTIONS): ICD-10-CM

## 2020-08-06 DIAGNOSIS — E78.2 MIXED HYPERLIPIDEMIA: ICD-10-CM

## 2020-08-06 DIAGNOSIS — J45.20 MILD INTERMITTENT ASTHMA WITHOUT COMPLICATION: ICD-10-CM

## 2020-08-06 DIAGNOSIS — Z20.822 CLOSE EXPOSURE TO COVID-19 VIRUS: ICD-10-CM

## 2020-08-06 DIAGNOSIS — E66.9 OBESITY (BMI 30-39.9): ICD-10-CM

## 2020-08-06 DIAGNOSIS — G47.33 OSA (OBSTRUCTIVE SLEEP APNEA): ICD-10-CM

## 2020-08-06 PROBLEM — J00 ACUTE NASOPHARYNGITIS: Status: RESOLVED | Noted: 2020-02-13 | Resolved: 2020-08-06

## 2020-08-06 PROBLEM — Z02.71 DISABILITY EXAMINATION: Status: RESOLVED | Noted: 2020-03-09 | Resolved: 2020-08-06

## 2020-08-06 PROCEDURE — 99214 OFFICE O/P EST MOD 30 MIN: CPT | Mod: 95,CR | Performed by: PHYSICIAN ASSISTANT

## 2020-08-06 NOTE — ASSESSMENT & PLAN NOTE
This is a chronic condition for this patient who has previously been controlled on amlodipine 5 mg, lisinopril-hydrochlorothiazide 20-25 mg 2 tablets daily, and metoprolol 50 mg daily.  States that a few months ago he hit his chest against a tree, and after that time he noticed that his blood pressure had dropped and his pulse rate had returned to normal.  So he tapered himself off of amlodipine and metoprolol.  He states that his blood pressures at home have been normal with today's blood pressure being 120/70 on his machine.  He also has decreased his lisinopril-hydrochlorothiazide down to 1 tablet daily.  He is being followed by Riccardo Amaro MD in cardiology.  His last visit with cardiology was in April 2020 and he plans on seeing them again in the next couple months.  He will discuss his medication management further with them at that time.  We will continue to follow and I have instructed the patient to contact me if he has any problems.

## 2020-08-06 NOTE — ASSESSMENT & PLAN NOTE
This is a chronic condition for this patient.  He states that he has been having a little more difficulty breathing recently.  States it has been much worse since the beginning of the year when he had a viral infection in his lungs.  He is currently on 2 breathing medications Qvar 2 puffs twice a day, albuterol MDI as needed.  He has an appointment with Starla Domínguez MD with pulmonary on October 1, 2020 and will address the effectiveness of his medications at that time.  He has not been using his rescue inhaler very often.  We will continue to follow along with pulmonology.

## 2020-08-06 NOTE — ASSESSMENT & PLAN NOTE
This is a chronic condition for this patient.  Patient states he is recently started on the keto diet along with intermittent fasting.  States that he walks 3 to 4 miles a day in addition to his diet.  He did not weigh today as his scale is broken, but he feels that he is losing weight as his pants are fitting better and his thighs do not rub together.  He will continue on this current diet and exercise plan and we will weigh him at his next visit.

## 2020-08-06 NOTE — ASSESSMENT & PLAN NOTE
This is a chronic condition for this patient that was exacerbated after a viral infection in his lungs in January 2020.  He is on 2 breathing medications to help with this condition and is followed by pulmonary with his next appointment on September 23, 2020.  We will continue to follow along with pulmonary.

## 2020-08-06 NOTE — ASSESSMENT & PLAN NOTE
Chronic condition for this patient for which he uses a CPAP.  He has a appointment with WILLIAM West in pulmonology on September 23, 2020 to discuss his breathing medications and adjust his CPAP if necessary.

## 2020-08-06 NOTE — PROGRESS NOTES
Telemedicine Visit: Established Patient     This encounter was conducted via Zoom .   Verbal consent was obtained. Patient's identity was verified.    Subjective:   CC: Establish care with a new provider  Zuhair Jj is a 49 y.o. male presenting for evaluation and management of:    Mild intermittent asthma without complication  This is a chronic condition for this patient.  He states that he has been having a little more difficulty breathing recently.  States it has been much worse since the beginning of the year when he had a viral infection in his lungs.  He is currently on 2 breathing medications Qvar 2 puffs twice a day, albuterol MDI as needed.  He has an appointment with Starla Doímnguez MD with pulmonary on October 1, 2020 and will address the effectiveness of his medications at that time.  He has not been using his rescue inhaler very often.  We will continue to follow along with pulmonology.    MAGDALENA (obstructive sleep apnea)  Chronic condition for this patient for which he uses a CPAP.  He has a appointment with WILLIAM West in pulmonology on September 23, 2020 to discuss his breathing medications and adjust his CPAP if necessary.    Essential hypertension  This is a chronic condition for this patient who has previously been controlled on amlodipine 5 mg, lisinopril-hydrochlorothiazide 20-25 mg 2 tablets daily, and metoprolol 50 mg daily.  States that a few months ago he hit his chest against a tree, and after that time he noticed that his blood pressure had dropped and his pulse rate had returned to normal.  So he tapered himself off of amlodipine and metoprolol.  He states that his blood pressures at home have been normal with today's blood pressure being 120/70 on his machine.  He also has decreased his lisinopril-hydrochlorothiazide down to 1 tablet daily.  He is being followed by Riccardo Amaro MD in cardiology.  His last visit with cardiology was in April 2020 and he plans on seeing them again  in the next couple months.  He will discuss his medication management further with them at that time.  We will continue to follow and I have instructed the patient to contact me if he has any problems.    Dyspnea on exertion  This is a chronic condition for this patient that was exacerbated after a viral infection in his lungs in January 2020.  He is on 2 breathing medications to help with this condition and is followed by pulmonary with his next appointment on September 23, 2020.  We will continue to follow along with pulmonary.    PVC's (premature ventricular contractions)  This was a chronic condition for the patient in the past.  States he took a blow to the chest a few months ago after running into a tree limb.  After that time he noted that his irregular heartbeats went away and his blood pressure dropped back down to normal.  See note above for essential hypertension.  We will continue to monitor this along with cardiology.    Mixed hyperlipidemia  This is a chronic condition for this patient for which he takes simvastatin 10 mg daily. His last lipid profile was June 2019 which showed total cholesterol 213, triglycerides 261, HDL 65, LDL 96.  We will order his annual lab testing and discuss results with him at his next visit.  We will continue on his current medication and dosage.    Close exposure to COVID-19 virus  Both the patient and his wife were exposed to a COVID positive patient approximately 2 weeks ago.  The positive contact is his wife's boss.  They have self quarantine for the last 2 weeks and neither are showing symptoms.  We discussed signs and symptoms that would prompt a need for him to go to the hospital.  We will continue to monitor.    Obesity (BMI 30-39.9)  This is a chronic condition for this patient.  Patient states he is recently started on the keto diet along with intermittent fasting.  States that he walks 3 to 4 miles a day in addition to his diet.  He did not weigh today as his  scale is broken, but he feels that he is losing weight as his pants are fitting better and his thighs do not rub together.  He will continue on this current diet and exercise plan and we will weigh him at his next visit.      ROS   Constitutional: Negative for fever, chills, weight change, fatigue, loss of appetite.  HENT: Negative for ear pain, nosebleeds, congestion, odynophagia, sore throat or changes in taste.    Eyes: Negative for vision changes.   Ears: Negative for recent changes in hearing, pain or discharge.  Neck: Negative for pain, swelling, lumps or goiter.  Respiratory: Positive for shortness of breath and intermittent wheezing.  Negative for cough, sputum production.    Cardiovascular: Negative for chest pain, palpitations, orthopnea and leg swelling.   Gastrointestinal: Negative for constipation, diarrhea, heartburn, dysphagia, nausea, vomiting or abdominal pain.   Genitourinary: Negative for dysuria, urgency and frequency.   Musculoskeletal: Negative for myalgias, joint pain, and back pain.  Skin: Negative for skin, hair or nail changes, rash, itching.   Neurological: Negative for dizziness, tingling, tremors, sensory change, gait/coordination changes, focal weakness and headaches.   Endo/Heme/Allergies: Does not bruise/bleed easily.   Psychiatric/Behavioral: Negative for depression, suicidal ideas and memory loss.  The patient is not nervous/anxious and does not have insomnia.    All other systems reviewed and are negative except as in HPI.      Allergies   Allergen Reactions   • Pcn [Penicillins] Anaphylaxis       Current medicines (including changes today)  Current Outpatient Medications   Medication Sig Dispense Refill   • lisinopril-hydrochlorothiazide (PRINZIDE) 20-25 MG per tablet Take 2 Tabs by mouth every day. 180 Tab 3   • simvastatin (ZOCOR) 10 MG Tab Take 1 Tab by mouth every evening. 90 Tab 3   • metFORMIN ER (GLUCOPHAGE XR) 750 MG TABLET SR 24 HR Take 1 Tab by mouth every day. 90 Tab 2    • aspirin 81 MG EC tablet Take 81 mg by mouth every day at 6 PM.       No current facility-administered medications for this visit.        Patient Active Problem List    Diagnosis Date Noted   • Close exposure to COVID-19 virus 08/06/2020   • Dyspnea on exertion 03/24/2020   • PVC's (premature ventricular contractions) 03/24/2020   • Acute pain of left shoulder 01/27/2020   • Mixed hyperlipidemia 01/27/2020   • Sprain of medial collateral ligament of left knee 06/05/2019   • Type 2 diabetes mellitus with complication, without long-term current use of insulin (HCC) 11/21/2018   • Obesity (BMI 30-39.9) 06/05/2018   • Elevated cortisol level 06/05/2018   • Mild intermittent asthma without complication 10/06/2016   • MAGDALENA (obstructive sleep apnea) 10/06/2016   • Essential hypertension 10/06/2016       Family History   Problem Relation Age of Onset   • Cancer Father    • Heart Failure Mother    • No Known Problems Child        He  has a past medical history of Asthma, Chest pain, Hypertension, Overweight, Rheumatic fever with cardiac involvement, and Sleep apnea.  He  has a past surgical history that includes other.       Objective:   /70 (BP Location: Left arm, Patient Position: Sitting, BP Cuff Size: Adult) Comment: Per patient  Pulse 68   Resp 16     Physical Exam:  Constitutional: Alert, no distress, well-groomed.  Skin: No rashes in visible areas.  Eye: Round. Conjunctiva clear, lids normal. No icterus.   ENMT: Lips pink without lesions, good dentition, moist mucous membranes. Phonation normal.  Neck: No masses, no thyromegaly. Moves freely without pain.  CV: Pulse and blood pressure as reported by patient  Respiratory: Unlabored respiratory effort, no cough or audible wheeze  Psych: Alert and oriented x3, normal affect and mood.       Assessment and Plan:   The following treatment plan was discussed:     1. Mild intermittent asthma without complication  - CBC WITH DIFFERENTIAL; Future    2. Essential  hypertension  - Comp Metabolic Panel; Future  - CBC WITH DIFFERENTIAL; Future  - Lipid Profile; Future    3. Obesity (BMI 30-39.9)  - CBC WITH DIFFERENTIAL; Future    4. Dyspnea on exertion    5. PVC's (premature ventricular contractions)    6. MAGDALENA (obstructive sleep apnea)    7. Mixed hyperlipidemia    8. Close exposure to COVID-19 virus        Follow-up: Return in about 2 months (around 10/6/2020) for Follow-up with labs.

## 2020-08-06 NOTE — ASSESSMENT & PLAN NOTE
This is a chronic condition for this patient for which he takes simvastatin 10 mg daily. His last lipid profile was June 2019 which showed total cholesterol 213, triglycerides 261, HDL 65, LDL 96.  We will order his annual lab testing and discuss results with him at his next visit.  We will continue on his current medication and dosage.

## 2020-08-06 NOTE — ASSESSMENT & PLAN NOTE
This was a chronic condition for the patient in the past.  States he took a blow to the chest a few months ago after running into a tree limb.  After that time he noted that his irregular heartbeats went away and his blood pressure dropped back down to normal.  See note above for essential hypertension.  We will continue to monitor this along with cardiology.

## 2020-08-06 NOTE — ASSESSMENT & PLAN NOTE
Both the patient and his wife were exposed to a COVID positive patient approximately 2 weeks ago.  The positive contact is his wife's boss.  They have self quarantine for the last 2 weeks and neither are showing symptoms.  We discussed signs and symptoms that would prompt a need for him to go to the hospital.  We will continue to monitor.

## 2020-08-06 NOTE — PROGRESS NOTES
No chief complaint on file.      HISTORY OF PRESENT ILLNESS: Patient is a 49 y.o. male established patient who presents today to discuss the following issues:    No problem-specific Assessment & Plan notes found for this encounter.      Patient Active Problem List    Diagnosis Date Noted   • Dyspnea on exertion 03/24/2020   • PVC's (premature ventricular contractions) 03/24/2020   • Disability examination 03/09/2020   • Acute nasopharyngitis 02/13/2020   • Acute pain of left shoulder 01/27/2020   • Mixed hyperlipidemia 01/27/2020   • Sprain of medial collateral ligament of left knee 06/05/2019   • Type 2 diabetes mellitus with complication, without long-term current use of insulin (HCC) 11/21/2018   • Obesity (BMI 30-39.9) 06/05/2018   • Elevated cortisol level 06/05/2018   • Mild intermittent asthma without complication 10/06/2016   • MAGDALENA (obstructive sleep apnea) 10/06/2016   • Essential hypertension 10/06/2016     qvar 2 puff bid    COVID exposure 2 weeks ago through wifes boss. ([facility] and office)    michele vera PRN    Pulm appt in sept 23rd    Riccardo Amaro MD  Cardiology    Allergies:Pcn [penicillins]    Current Outpatient Medications   Medication Sig Dispense Refill   • metoprolol SR (TOPROL XL) 50 MG TABLET SR 24 HR Take 1 Tab by mouth every day. 90 Tab 3   • amLODIPine (NORVASC) 5 MG Tab Take 1 Tab by mouth every day. 90 Tab 3   • lisinopril-hydrochlorothiazide (PRINZIDE) 20-25 MG per tablet Take 2 Tabs by mouth every day. 180 Tab 3   • simvastatin (ZOCOR) 10 MG Tab Take 1 Tab by mouth every evening. 90 Tab 3   • metFORMIN ER (GLUCOPHAGE XR) 750 MG TABLET SR 24 HR Take 1 Tab by mouth every day. 90 Tab 2   • aspirin 81 MG EC tablet Take 81 mg by mouth every day at 6 PM.       No current facility-administered medications for this visit.        Office Visit on 03/24/2020   Component Date Value Ref Range Status   • Report 03/24/2020    Final                    Value:[facility] [place]  B    Test Date:  2020  Pt Name:    JERI SIMMONS                Department: [facility]  MRN:        [id number]                      Room:  Gender:     Male                         Technician: [id number]  :        1970                   Requested By:RICCARDO HOLLINS  Order #:    [id number]                    Reading MD: Riccardo Hollins MD    Measurements  Intervals                                Axis  Rate:       92                           P:          40  MA:         152                          QRS:        61  QRSD:       86                           T:          52  QT:         352  QTc:        436    Interpretive Statements  SINUS TACHYCARDIA  MULTIPLE VENTRICULAR PREMATURE COMPLEXES  EARLY PRECORDIAL R/S TRANSITION  Compared to ECG 2020 00:29:20  ST (T wave) deviation no longer present  T-wave abnormality no longer present  Electronically Signed On 3- 12:42:22 PDT by Riccardo Hollins MD     Admission on 02/10/2020, Discharged on 2020   Component Date Value Ref Range Status   • Glucose - Accu-Ck 02/10/2020 131* 65 - 99 mg/dL Final   • Report 2020    Final                    Value:[facility]    Test Date:  2020  Pt Name:    JERI SIMMONS                Department: ER  MRN:        [id number]                      Room:       [facility]  Gender:     Male                         Technician: [id number]  :        1970                   Requested By:IBAN THOMPSON  Order #:    [id number]                    Reading MD: IBAN THOMPSON MD    Measurements  Intervals                                Axis  Rate:       103                          P:          34  MA:         168                          QRS:        41  QRSD:       94                           T:          52  QT:         320  QTc:        419    Interpretive Statements  Twelve-lead EKG shows a sinus tachycardia with frequent premature ventricular    contractions but no sustained runs of ventricular  beats.  The native beats do    not show any evidence of ST segment elevation nor depression and abnormal T  waves.  Electronically Signed On 2- 1:28:34 PST by IBAN THOMPSON MD     • WBC 02/11/2020 7.6  4.8 - 10.8 K/uL Final   • RBC 02/11/2020 4.90  4.70 - 6.10 M/uL Final   • Hemoglobin 02/11/2020 16.6  14.0 - 18.0 g/dL Final   • Hematocrit 02/11/2020 46.7  42.0 - 52.0 % Final   • MCV 02/11/2020 95.3  81.4 - 97.8 fL Final   • MCH 02/11/2020 33.9* 27.0 - 33.0 pg Final   • MCHC 02/11/2020 35.5* 33.7 - 35.3 g/dL Final   • RDW 02/11/2020 45.7  35.9 - 50.0 fL Final   • Platelet Count 02/11/2020 187  164 - 446 K/uL Final   • MPV 02/11/2020 8.7* 9.0 - 12.9 fL Final   • Neutrophils-Polys 02/11/2020 62.90  44.00 - 72.00 % Final   • Lymphocytes 02/11/2020 21.40* 22.00 - 41.00 % Final   • Monocytes 02/11/2020 13.10  0.00 - 13.40 % Final   • Eosinophils 02/11/2020 1.50  0.00 - 6.90 % Final   • Basophils 02/11/2020 0.70  0.00 - 1.80 % Final   • Immature Granulocytes 02/11/2020 0.40  0.00 - 0.90 % Final   • Nucleated RBC 02/11/2020 0.00  /100 WBC Final   • Neutrophils (Absolute) 02/11/2020 4.78  1.82 - 7.42 K/uL Final    Includes immature neutrophils, if present.   • Lymphs (Absolute) 02/11/2020 1.62  1.00 - 4.80 K/uL Final   • Monos (Absolute) 02/11/2020 0.99* 0.00 - 0.85 K/uL Final   • Eos (Absolute) 02/11/2020 0.11  0.00 - 0.51 K/uL Final   • Baso (Absolute) 02/11/2020 0.05  0.00 - 0.12 K/uL Final   • Immature Granulocytes (abs) 02/11/2020 0.03  0.00 - 0.11 K/uL Final   • NRBC (Absolute) 02/11/2020 0.00  K/uL Final   • Sodium 02/11/2020 140  135 - 145 mmol/L Final   • Potassium 02/11/2020 3.7  3.6 - 5.5 mmol/L Final   • Chloride 02/11/2020 103  96 - 112 mmol/L Final   • Co2 02/11/2020 23  20 - 33 mmol/L Final   • Anion Gap 02/11/2020 14.0* 0.0 - 11.9 Final   • Glucose 02/11/2020 104* 65 - 99 mg/dL Final   • Bun 02/11/2020 16  8 - 22 mg/dL Final   • Creatinine 02/11/2020 0.84  0.50 - 1.40  mg/dL Final   • Calcium 2020 8.9  8.5 - 10.5 mg/dL Final   • AST(SGOT) 2020 34  12 - 45 U/L Final   • ALT(SGPT) 2020 40  2 - 50 U/L Final   • Alkaline Phosphatase 2020 47  30 - 99 U/L Final   • Total Bilirubin 2020 0.5  0.1 - 1.5 mg/dL Final   • Albumin 2020 4.8  3.2 - 4.9 g/dL Final   • Total Protein 2020 7.3  6.0 - 8.2 g/dL Final   • Globulin 2020 2.5  1.9 - 3.5 g/dL Final   • A-G Ratio 2020 1.9  g/dL Final   • Lipase 2020 34  11 - 82 U/L Final   • Troponin T 2020 <6  6 - 19 ng/L Final    Comment: As of 2019 at 0900, the Troponin I test has been replaced with the  Ultra High Sensitivity (Gen 5) Troponin T test.  Please note new analyte,  methodology, and reference range.  The Ultra High Sensitivity Troponin T test has a reference range for  positive troponins that follows the recommendation of the American College  of Cardiology (ACC) committee in conjunction with the 99th percentile  reference population. Normal range: 6-19 ng/L     • GFR If  2020 >60  >60 mL/min/1.73 m 2 Final   • GFR If Non  2020 >60  >60 mL/min/1.73 m 2 Final   ]    Social History     Tobacco Use   • Smoking status: Never Smoker   • Smokeless tobacco: Never Used   Substance Use Topics   • Alcohol use: No   • Drug use: No       Family Status   Relation Name Status   • Fa     • Mo     • Bro  Other   • Bro  Other   • Child  Alive     Family History   Problem Relation Age of Onset   • Cancer Father    • Heart Failure Mother    • No Known Problems Child        No LMP for male patient.    Health Maintenance Summary                RETINAL SCREENING Overdue 1986     IMM HEP B VACCINE Overdue 1987     IMM DTaP/Tdap/Td Vaccine Overdue 1987     COLONOSCOPY Overdue 2018     IMM ZOSTER VACCINES Overdue 2018     A1C SCREENING Overdue 2019      Done 2018 POCT A1C     Patient has more  history with this topic...    DIABETES MONOFILAMENT / LE EXAM Overdue 11/21/2019      Done 11/21/2018 AMB DIABETIC MONOFILAMENT LOWER EXTREMITY EXAM    FASTING LIPID PROFILE Overdue 6/5/2020      Done 6/5/2019 LIPID PROFILE     Patient has more history with this topic...    URINE ACR / MICROALBUMIN Overdue 6/5/2020      Done 6/5/2019 MICROALBUMIN CREAT RATIO URINE    IMM INFLUENZA Next Due 9/1/2020      Done 9/28/2019 Imm Admin: Influenza Vaccine Quad Recombinant     Patient has more history with this topic...    SERUM CREATININE Next Due 2/11/2021      Done 2/11/2020 COMP METABOLIC PANEL     Patient has more history with this topic...           Review of Systems: ***  Constitutional: Negative for fever, chills, weight change, fatigue, loss of appetite.  HENT: Negative for ear pain, nosebleeds, congestion, odynophagia, sore throat or changes in taste.    Eyes: Negative for vision changes.   Ears: Negative for recent changes in hearing, pain or discharge.  Neck: Negative for pain, swelling, lumps or goiter.  Respiratory: Negative for cough, sputum production, shortness of breath and wheezing.    Cardiovascular: Negative for chest pain, palpitations, orthopnea and leg swelling.   Gastrointestinal: Negative for constipation, diarrhea, heartburn, dysphagia, nausea, vomiting or abdominal pain.   Genitourinary: Negative for dysuria, urgency and frequency.   Musculoskeletal: Negative for myalgias, joint pain, and back pain.  Skin: Negative for skin, hair or nail changes, rash, itching.   Neurological: Negative for dizziness, tingling, tremors, sensory change, gait/coordination changes, focal weakness and headaches.   Endo/Heme/Allergies: Does not bruise/bleed easily.   Psychiatric/Behavioral: Negative for depression, suicidal ideas and memory loss.  The patient is not nervous/anxious and does not have insomnia.    All other systems reviewed and are negative except as in HPI.    Exam:  There were no vitals taken for this  visit. There is no height or weight on file to calculate BMI.  General:  Well nourished, *** well developed male. No apparent distress.  Head: Grossly normal.  Eyes: EOM intact, PERRL, conjunctiva non-injected, sclera non-icteric.  Ears: Sandee pinnae, external auditory canals, TM pearly gray with normal light reflex bilaterally.  Neck: Supple supple with no cervical lymphadenopathy, JVD, palpable thyroid nodules or carotid bruits.  Pulmonary: Clear to ausculation bilaterally. Normal effort. No rales, ronchi, or wheezing.  Cardiovascular: Regular rate and rhythm without murmur, rub or gallop.   Abdomen:  Soft, non-distended, non-tender with normal bowel sounds. No CVA tenderness  Extremities: ***Full range of motion. Warm and well perfused with no edema.  Skin: Intact with no obvious rashes or lesions.  Neuro: Grossly intact.  Psych: Alert and oriented x 3.  Appropriately dressed. Mood and affect appropriate.      Assessment/Plan:  No diagnosis found.    Reviewed risks and benefits of treatment plan. Patient verbally agrees to plan of care.     No follow-ups on file.    Please note that this dictation was created using voice recognition software. I have made every reasonable attempt to correct obvious errors, but I expect that there are errors of grammar and possibly content that I did not discover before finalizing the note.

## 2020-09-23 ENCOUNTER — OFFICE VISIT (OUTPATIENT)
Dept: SLEEP MEDICINE | Facility: MEDICAL CENTER | Age: 50
End: 2020-09-23
Payer: COMMERCIAL

## 2020-09-23 VITALS
DIASTOLIC BLOOD PRESSURE: 74 MMHG | HEART RATE: 91 BPM | SYSTOLIC BLOOD PRESSURE: 126 MMHG | WEIGHT: 226 LBS | HEIGHT: 67 IN | OXYGEN SATURATION: 95 % | BODY MASS INDEX: 35.47 KG/M2

## 2020-09-23 DIAGNOSIS — I10 ESSENTIAL HYPERTENSION: ICD-10-CM

## 2020-09-23 DIAGNOSIS — G47.33 OSA (OBSTRUCTIVE SLEEP APNEA): ICD-10-CM

## 2020-09-23 PROCEDURE — 99213 OFFICE O/P EST LOW 20 MIN: CPT | Performed by: NURSE PRACTITIONER

## 2020-09-23 ASSESSMENT — FIBROSIS 4 INDEX: FIB4 SCORE: 1.49

## 2020-09-23 NOTE — PROGRESS NOTES
Chief Complaint   Patient presents with   • Follow-Up     MAGDALENA-Last seen 10/10/2018       HPI:      Mr. Jj is a 51 y/o male patient who is in today for annual MAGDALENA f/u. Patient is a former [facility] patient. PMH includes asthma, hypertension, DM II, dyspnea on exertion, PVCs, obesity.    Patient is also followed by the pulmonary clinic.  Please refer to Dr. Domínguez's note from 3/25/2020 for further details.    Prior PSG from 11/5/07 indicated an AHI of 15.5 with a low oxygen saturation.     PSG titration from 12/10/07 indicated successful titration of nasal CPAP to an optimal pressure of 10 cm with an AHI of 1/h, and average O2 saturation of 94%.    Overnight oximetry 1/10/2017 on his current pressures indicate a mean 02 saturation of 96.6%. He did have 1 episode of desaturation. However, it appears his mask may have been off at that time.     Compliance report from 8/24/20-9/22/20 was downloaded and reviewed with the patient which showed CPAP 12 cmH2O, C-flex 2, 100% compliance, 5 hrs 56 min use, AHI of 3.4. He is tolerating the pressure and mask well, but thinks he would like to increase the pressure slightly because sometimes he has difficulty breathing. He goes to bed between 8-9 pm and wakes up at 6 am.  He feels that he has been having trouble sleeping due to having some difficulty with breathing which does improve if he elevates the head of his bed.  He denies morning headache or snoring.  He continues to use pro-air, Advair and albuterol as directed.  He does report some wheezing at times.  He tries to stay active by walking 5 miles a day.  He has been placed on lisinopril which is managed by his PCP.  He checks his blood pressure at home on a regular basis. He followed up with Dr. Amaro in April 2020 for PVCs.       ROS:    Constitutional: Denies fevers, Denies weight changes  Eyes: Denies changes in vision, no eye pain  Ears/Nose/Throat/Mouth: Denies nasal congestion or sore throat   Cardiovascular:  "Denies chest pain or palpitations   Respiratory: Denies shortness of breath , Denies cough  Gastrointestinal/Hepatic: Denies abdominal pain, nausea, vomiting,   Skin/Breast: Denies rash,   Neurological: Denies headache, confusion,   Psychiatric: denies mood disorder   Sleep: denies snoring       Past Medical History:   Diagnosis Date   • Asthma    • Chest pain    • Hypertension    • Overweight    • Rheumatic fever with cardiac involvement     patient cant recall but thinks there was a \"tear\" in his heart   • Sleep apnea        Past Surgical History:   Procedure Laterality Date   • OTHER      jaw reconstruction       Family History   Problem Relation Age of Onset   • Cancer Father    • Heart Failure Mother    • No Known Problems Child        Social History     Socioeconomic History   • Marital status:      Spouse name: Not on file   • Number of children: Not on file   • Years of education: Not on file   • Highest education level: Not on file   Occupational History   • Not on file   Social Needs   • Financial resource strain: Not on file   • Food insecurity     Worry: Not on file     Inability: Not on file   • Transportation needs     Medical: Not on file     Non-medical: Not on file   Tobacco Use   • Smoking status: Never Smoker   • Smokeless tobacco: Never Used   Substance and Sexual Activity   • Alcohol use: No   • Drug use: No   • Sexual activity: Yes     Partners: Female   Lifestyle   • Physical activity     Days per week: Not on file     Minutes per session: Not on file   • Stress: Not on file   Relationships   • Social connections     Talks on phone: Not on file     Gets together: Not on file     Attends Confucianist service: Not on file     Active member of club or organization: Not on file     Attends meetings of clubs or organizations: Not on file     Relationship status: Not on file   • Intimate partner violence     Fear of current or ex partner: Not on file     Emotionally abused: Not on file     " Physically abused: Not on file     Forced sexual activity: Not on file   Other Topics Concern   • Not on file   Social History Narrative   • Not on file       Allergies as of 09/23/2020 - Reviewed 09/23/2020   Allergen Reaction Noted   • Pcn [penicillins] Anaphylaxis 08/30/2016        Vitals:  Vitals:    09/23/20 1254   BP: 126/74   Pulse: 91   SpO2: 95%       Current medications as of today   Current Outpatient Medications   Medication Sig Dispense Refill   • lisinopril-hydrochlorothiazide (PRINZIDE) 20-25 MG per tablet Take 2 Tabs by mouth every day. 180 Tab 3   • metFORMIN ER (GLUCOPHAGE XR) 750 MG TABLET SR 24 HR Take 1 Tab by mouth every day. 90 Tab 2   • aspirin 81 MG EC tablet Take 81 mg by mouth every day at 6 PM.     • simvastatin (ZOCOR) 10 MG Tab Take 1 Tab by mouth every evening. (Patient not taking: Reported on 9/23/2020) 90 Tab 3     No current facility-administered medications for this visit.          Physical Exam:   Appearance: Well developed, well nourished, no acute distress  Eyes: PERRL, EOM intact, sclera white, conjunctiva moist  Ears: no lesions or deformities  Hearing: grossly intact  Nose: no lesions or deformities  Respiratory effort: no intercostal retractions or use of accessory muscles  Extremities: no cyanosis or edema  Abdomen: soft   Gait and Station: normal  Digits and nails: no clubbing, cyanosis, petechiae or nodes.  Cranial nerves: grossly intact  Skin: no visible rashes, lesions or ulcers noted  Orientation: Oriented to time, person and place  Mood and affect: mood and affect appropriate, normal interaction with examiner  Judgement: Intact    Assessment:  1. MAGDALENA (obstructive sleep apnea)     2. Essential hypertension     3. BMI 35.0-35.9,adult           Plan  Discussed the cardiovascular and neuropsychiatric risks of untreated MAGDALENA; including but not limited to: HTN, DM, MI, ASCVD, CVA, CHF, traffic accidents.     1. Compliance report from 8/24/20-9/22/20 was downloaded and  reviewed with the patient which showed CPAP 12 cmH2O, 100% compliance, 5 hrs 56 min use, AHI of 3.4. Continue CPAP. Patient is compliant and benefiting from CPAP therapy for management of MAGDALENA.   2. DME order ([employer]) for mask (FFM or MOC) and supplies was provided today. Continue to clean mask and supplies weekly, and change supplies per insurance guidelines.   3. DME order to increase CPAP to 13 cmH2O was provided today.  We will reassess AHI at next office visit and tolerance of pressure.  May need to consider an updated titration study.  4. Continue to stay active by walking 5 miles a day as able.   5. Follow up with the appropriate healthcare practitioners for all other medical problems and issues.  Continue to follow-up with [facility].    6. Sleep hygiene discussed.   7. Continue to f/u with the pulmonary clinic. Next apt is on 10/1/20 with Dr. Domínguez.  Continue using inhalers as directed.  8. F/u in 4 months.       JULIA Malhotra.      This dictation was created using voice recognition software. The accuracy of the dictation is limited to the abilities of the software. I expect there may be some errors of grammar and possibly content.

## 2020-10-01 ENCOUNTER — NON-PROVIDER VISIT (OUTPATIENT)
Dept: PULMONOLOGY | Facility: HOSPICE | Age: 50
End: 2020-10-01
Attending: PHYSICIAN ASSISTANT
Payer: COMMERCIAL

## 2020-10-01 ENCOUNTER — OFFICE VISIT (OUTPATIENT)
Dept: PULMONOLOGY | Facility: HOSPICE | Age: 50
End: 2020-10-01
Payer: COMMERCIAL

## 2020-10-01 VITALS
HEART RATE: 75 BPM | TEMPERATURE: 98.1 F | RESPIRATION RATE: 16 BRPM | HEIGHT: 67 IN | WEIGHT: 220 LBS | SYSTOLIC BLOOD PRESSURE: 112 MMHG | BODY MASS INDEX: 34.53 KG/M2 | OXYGEN SATURATION: 96 % | DIASTOLIC BLOOD PRESSURE: 70 MMHG

## 2020-10-01 VITALS — WEIGHT: 222 LBS | BODY MASS INDEX: 34.77 KG/M2

## 2020-10-01 DIAGNOSIS — G47.33 OSA (OBSTRUCTIVE SLEEP APNEA): ICD-10-CM

## 2020-10-01 DIAGNOSIS — J45.20 MILD INTERMITTENT ASTHMA WITHOUT COMPLICATION: ICD-10-CM

## 2020-10-01 PROCEDURE — 94726 PLETHYSMOGRAPHY LUNG VOLUMES: CPT | Performed by: INTERNAL MEDICINE

## 2020-10-01 PROCEDURE — 94729 DIFFUSING CAPACITY: CPT | Performed by: INTERNAL MEDICINE

## 2020-10-01 PROCEDURE — 94060 EVALUATION OF WHEEZING: CPT | Performed by: INTERNAL MEDICINE

## 2020-10-01 PROCEDURE — 99214 OFFICE O/P EST MOD 30 MIN: CPT | Mod: 25 | Performed by: INTERNAL MEDICINE

## 2020-10-01 ASSESSMENT — ENCOUNTER SYMPTOMS
HEARTBURN: 0
SPEECH CHANGE: 0
SPUTUM PRODUCTION: 0
DIZZINESS: 0
ORTHOPNEA: 0
MYALGIAS: 0
DIARRHEA: 0
PALPITATIONS: 0
NECK PAIN: 0
PND: 0
STRIDOR: 0
VOMITING: 0
DOUBLE VISION: 0
WEIGHT LOSS: 0
NAUSEA: 0
HEMOPTYSIS: 0
DEPRESSION: 0
DIAPHORESIS: 0
EYE REDNESS: 0
EYE DISCHARGE: 0
SINUS PAIN: 0
WEAKNESS: 0
BLURRED VISION: 0
HEADACHES: 0
ABDOMINAL PAIN: 0
FEVER: 0
COUGH: 0
PHOTOPHOBIA: 0
TREMORS: 0
EYE PAIN: 0
CONSTIPATION: 0
SORE THROAT: 0
CHILLS: 0
FOCAL WEAKNESS: 0
CLAUDICATION: 0
SHORTNESS OF BREATH: 1
WHEEZING: 0
FALLS: 0
BACK PAIN: 0

## 2020-10-01 ASSESSMENT — PULMONARY FUNCTION TESTS
FEV1/FVC_PERCENT_PREDICTED: 104
FEV1/FVC: 83.68
FEV1/FVC_PERCENT_PREDICTED: 106
FEV1/FVC: 84
FVC_LLN: 3.63
FVC: 3.68
FEV1/FVC_PERCENT_CHANGE: 120
FEV1/FVC_PERCENT_CHANGE: 1
FEV1/FVC_PREDICTED: 79
FVC: 3.86
FEV1/FVC_PERCENT_PREDICTED: 79
FEV1_LLN: 2.88
FEV1/FVC: 83
FEV1/FVC_PERCENT_LLN: 66
FVC_PREDICTED: 4.35
FEV1: 3.04
FEV1/FVC: 83
FEV1_PERCENT_PREDICTED: 93
FVC_PERCENT_PREDICTED: 84
FEV1_PREDICTED: 3.45
FEV1/FVC_PERCENT_PREDICTED: 104
FVC_PERCENT_PREDICTED: 88
FEV1/FVC_PERCENT_PREDICTED: 105
FEV1_PERCENT_CHANGE: 6
FEV1_PERCENT_PREDICTED: 87
FEV1_PERCENT_CHANGE: 5
FEV1: 3.23

## 2020-10-01 ASSESSMENT — FIBROSIS 4 INDEX
FIB4 SCORE: 1.49
FIB4 SCORE: 1.49
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
## 2020-10-01 NOTE — PROCEDURES
Technician: LAMONT Briceño    Technician Comment:  Good patient effort & cooperation.  The results of this test meet the ATS/ERS standards for acceptability & reproducibility.  Test was performed on the Active-Semi Body Plethysmograph-Elite DX system.  Predicted values were GLI-2012 for spirometry, GLI-2017 for DLCO, ITS for Lung Volumes.  The DLCO was uncorrected for Hgb.  A bronchodilator of Ventolin HFA -2puffs via spacer administered.  DLCO performed during dilation period.    Interpretation:  1.  Baseline spirometry shows normal airflows.  2.  There is trend toward bronchodilator response but does not meet strict criteria.  3.  Lung volumes are within normal limits.  4.  Diffusion capacity is elevated at 129% predicted.  Pulmonary function testing shows normal airflows with normal lung volumes, borderline bronchodilator response with elevated DLCO which may be consistent with reactive airways disease.  Suggest clinical correlation.

## 2020-10-02 ENCOUNTER — TELEPHONE (OUTPATIENT)
Dept: CARDIOLOGY | Facility: MEDICAL CENTER | Age: 50
End: 2020-10-02

## 2020-10-02 NOTE — TELEPHONE ENCOUNTER
SUNDAY/roberta    Pt calling to arrange for referral to have cardiac E/P consultation due to extra heart beats.  Pt has insurance again and wants to schedule something.    Please call Zuhair  cell.

## 2020-10-02 NOTE — TELEPHONE ENCOUNTER
LM on patient's voicemail and answering machine requesting a call back to schedule an appointment with EP.

## 2020-10-12 ENCOUNTER — OFFICE VISIT (OUTPATIENT)
Dept: CARDIOLOGY | Facility: MEDICAL CENTER | Age: 50
End: 2020-10-12
Payer: COMMERCIAL

## 2020-10-12 VITALS
BODY MASS INDEX: 34.69 KG/M2 | HEIGHT: 67 IN | WEIGHT: 221 LBS | HEART RATE: 91 BPM | SYSTOLIC BLOOD PRESSURE: 114 MMHG | OXYGEN SATURATION: 97 % | DIASTOLIC BLOOD PRESSURE: 82 MMHG

## 2020-10-12 DIAGNOSIS — G47.33 OSA (OBSTRUCTIVE SLEEP APNEA): ICD-10-CM

## 2020-10-12 DIAGNOSIS — I49.3 PVC'S (PREMATURE VENTRICULAR CONTRACTIONS): ICD-10-CM

## 2020-10-12 DIAGNOSIS — E11.8 TYPE 2 DIABETES MELLITUS WITH COMPLICATION, WITHOUT LONG-TERM CURRENT USE OF INSULIN (HCC): ICD-10-CM

## 2020-10-12 DIAGNOSIS — I10 ESSENTIAL HYPERTENSION: ICD-10-CM

## 2020-10-12 DIAGNOSIS — I48.0 PAF (PAROXYSMAL ATRIAL FIBRILLATION) (HCC): ICD-10-CM

## 2020-10-12 DIAGNOSIS — I49.3 PVC (PREMATURE VENTRICULAR CONTRACTION): ICD-10-CM

## 2020-10-12 LAB — EKG IMPRESSION: NORMAL

## 2020-10-12 PROCEDURE — 93000 ELECTROCARDIOGRAM COMPLETE: CPT | Performed by: INTERNAL MEDICINE

## 2020-10-12 PROCEDURE — 99204 OFFICE O/P NEW MOD 45 MIN: CPT | Performed by: INTERNAL MEDICINE

## 2020-10-12 RX ORDER — BECLOMETHASONE DIPROPIONATE HFA 80 UG/1
AEROSOL, METERED RESPIRATORY (INHALATION)
COMMUNITY
Start: 2020-10-01 | End: 2021-12-13 | Stop reason: SDUPTHER

## 2020-10-12 ASSESSMENT — FIBROSIS 4 INDEX: FIB4 SCORE: 1.49

## 2020-10-12 NOTE — PROGRESS NOTES
"Chief Complaint   Patient presents with   • Premature Ventricular Contractions (PVCs)     PP DX:PVC       Subjective:   Zuhair Jj is a 50 y.o. male who presents today being seen in consult request of Dr. Amaro secondary to symptomatic PVCs outflow tract.  4-month history of these being worse but a long history of them seen by Dr Field you.  Recently diminished symptoms.  Tried beta-blockers but no improvement.  Not too troubled by it at this time.  Normal stress imaging except for PVC's recently and nl echocardiogram (2018) in the past.  Hypertension diabetes.  Sleep apnea.  Does not smoke.  No alcohol use.  Retired from the long-term system.  Feels well.  Unaware of palpitations at this time.  No chest pain or shortness breath.    Past Medical History:   Diagnosis Date   • Asthma    • Chest pain    • Overweight    • Rheumatic fever with cardiac involvement     patient cant recall but thinks there was a \"tear\" in his heart     Past Surgical History:   Procedure Laterality Date   • OTHER      jaw reconstruction     Family History   Problem Relation Age of Onset   • Cancer Father    • Heart Failure Mother    • No Known Problems Child      Social History     Socioeconomic History   • Marital status:      Spouse name: Not on file   • Number of children: Not on file   • Years of education: Not on file   • Highest education level: Not on file   Occupational History   • Not on file   Social Needs   • Financial resource strain: Not on file   • Food insecurity     Worry: Not on file     Inability: Not on file   • Transportation needs     Medical: Not on file     Non-medical: Not on file   Tobacco Use   • Smoking status: Never Smoker   • Smokeless tobacco: Never Used   Substance and Sexual Activity   • Alcohol use: No   • Drug use: No   • Sexual activity: Yes     Partners: Female   Lifestyle   • Physical activity     Days per week: Not on file     Minutes per session: Not on file   • Stress: Not on file " "  Relationships   • Social connections     Talks on phone: Not on file     Gets together: Not on file     Attends Christian service: Not on file     Active member of club or organization: Not on file     Attends meetings of clubs or organizations: Not on file     Relationship status: Not on file   • Intimate partner violence     Fear of current or ex partner: Not on file     Emotionally abused: Not on file     Physically abused: Not on file     Forced sexual activity: Not on file   Other Topics Concern   • Not on file   Social History Narrative   • Not on file     Allergies   Allergen Reactions   • Pcn [Penicillins] Anaphylaxis     Outpatient Encounter Medications as of 10/12/2020   Medication Sig Dispense Refill   • lisinopril-hydrochlorothiazide (PRINZIDE) 20-25 MG per tablet Take 2 Tabs by mouth every day. 180 Tab 3   • metFORMIN ER (GLUCOPHAGE XR) 750 MG TABLET SR 24 HR Take 1 Tab by mouth every day. 90 Tab 2   • aspirin 81 MG EC tablet Take 81 mg by mouth every day at 6 PM.     • PROAIR  (90 Base) MCG/ACT Aero Soln inhalation aerosol      • QVAR REDIHALER 80 MCG/ACT inhaler      • [DISCONTINUED] Albuterol Sulfate 108 (90 Base) MCG/ACT AEROSOL POWDER, BREATH ACTIVATED Inhale 2 Inhalation by mouth every four hours as needed (SOB). (Patient not taking: Reported on 10/12/2020) 1 Each 11   • [DISCONTINUED] beclomethasone (QVAR) 80 MCG/ACT inhaler Inhale 1 Puff by mouth 2 times a day. (Patient not taking: Reported on 10/12/2020) 7.3 g 11   • [DISCONTINUED] simvastatin (ZOCOR) 10 MG Tab Take 1 Tab by mouth every evening. (Patient not taking: Reported on 9/23/2020) 90 Tab 3     No facility-administered encounter medications on file as of 10/12/2020.      ROS     Objective:   /82 (BP Location: Left arm, Patient Position: Sitting, BP Cuff Size: Large adult)   Pulse 91   Ht 1.702 m (5' 7\")   Wt 100.2 kg (221 lb)   SpO2 97%   BMI 34.61 kg/m²     Physical Exam   Constitutional: He is oriented to person, " place, and time. He appears well-developed and well-nourished.   HENT:   Head: Normocephalic and atraumatic.   Eyes: EOM are normal.   Neck: Normal range of motion. Neck supple.   Cardiovascular: Normal rate, regular rhythm and intact distal pulses.  Occasional extrasystoles are present. Exam reveals no gallop and no friction rub.   No murmur heard.  Pulmonary/Chest: Effort normal and breath sounds normal.   Abdominal: Soft.   Musculoskeletal: Normal range of motion.         General: No edema.   Neurological: He is alert and oriented to person, place, and time.   Skin: Skin is warm and dry.   Psychiatric: He has a normal mood and affect. His behavior is normal. Judgment and thought content normal.       Assessment:     1. PAF (paroxysmal atrial fibrillation) ([facility])     2. PVC (premature ventricular contraction)  EKG   3. PVC's (premature ventricular contractions)     4. MAGDALENA (obstructive sleep apnea)     5. Essential hypertension     6. Type 2 diabetes mellitus with complication, without long-term current use of insulin ([facility])         Medical Decision Making:  Today's Assessment / Status / Plan:   1.  Outflow tract PVCs.  Improved.  Not on meds.  If worsens consideration for an antiarrhythmic such as flecainide or sotalol.  Could consider ablation.  At this time patient is feeling well.  2.  Diabetes mellitus treated.  3.  Obesity on diet.  4.  Hypertension currently on meds.  5.  Sleep apnea treated.  6.  Follow-up with me in 6 months.

## 2020-10-13 ENCOUNTER — PATIENT MESSAGE (OUTPATIENT)
Dept: CARDIOLOGY | Facility: MEDICAL CENTER | Age: 50
End: 2020-10-13

## 2020-11-02 ENCOUNTER — HOSPITAL ENCOUNTER (OUTPATIENT)
Facility: MEDICAL CENTER | Age: 50
End: 2020-11-02
Attending: PHYSICIAN ASSISTANT
Payer: COMMERCIAL

## 2020-11-02 ENCOUNTER — NON-PROVIDER VISIT (OUTPATIENT)
Dept: MEDICAL GROUP | Facility: CLINIC | Age: 50
End: 2020-11-02
Payer: COMMERCIAL

## 2020-11-02 PROCEDURE — 80061 LIPID PANEL: CPT

## 2020-11-02 PROCEDURE — 36415 COLL VENOUS BLD VENIPUNCTURE: CPT | Performed by: PHYSICIAN ASSISTANT

## 2020-11-02 PROCEDURE — 85025 COMPLETE CBC W/AUTO DIFF WBC: CPT

## 2020-11-02 PROCEDURE — 80053 COMPREHEN METABOLIC PANEL: CPT

## 2020-11-03 DIAGNOSIS — I10 ESSENTIAL HYPERTENSION: ICD-10-CM

## 2020-11-03 DIAGNOSIS — E66.9 OBESITY (BMI 30-39.9): ICD-10-CM

## 2020-11-03 DIAGNOSIS — J45.20 MILD INTERMITTENT ASTHMA WITHOUT COMPLICATION: ICD-10-CM

## 2020-11-03 LAB
ALBUMIN SERPL BCP-MCNC: 4.4 G/DL (ref 3.2–4.9)
ALBUMIN/GLOB SERPL: 1.9 G/DL
ALP SERPL-CCNC: 57 U/L (ref 30–99)
ALT SERPL-CCNC: 93 U/L (ref 2–50)
ANION GAP SERPL CALC-SCNC: 9 MMOL/L (ref 7–16)
AST SERPL-CCNC: 61 U/L (ref 12–45)
BASOPHILS # BLD AUTO: 1 % (ref 0–1.8)
BASOPHILS # BLD: 0.04 K/UL (ref 0–0.12)
BILIRUB SERPL-MCNC: 0.5 MG/DL (ref 0.1–1.5)
BUN SERPL-MCNC: 9 MG/DL (ref 8–22)
CALCIUM SERPL-MCNC: 9.1 MG/DL (ref 8.5–10.5)
CHLORIDE SERPL-SCNC: 100 MMOL/L (ref 96–112)
CHOLEST SERPL-MCNC: 231 MG/DL (ref 100–199)
CO2 SERPL-SCNC: 23 MMOL/L (ref 20–33)
CREAT SERPL-MCNC: 0.68 MG/DL (ref 0.5–1.4)
EOSINOPHIL # BLD AUTO: 0.04 K/UL (ref 0–0.51)
EOSINOPHIL NFR BLD: 1 % (ref 0–6.9)
ERYTHROCYTE [DISTWIDTH] IN BLOOD BY AUTOMATED COUNT: 46.9 FL (ref 35.9–50)
GLOBULIN SER CALC-MCNC: 2.3 G/DL (ref 1.9–3.5)
GLUCOSE SERPL-MCNC: 113 MG/DL (ref 65–99)
HCT VFR BLD AUTO: 48.5 % (ref 42–52)
HDLC SERPL-MCNC: 65 MG/DL
HGB BLD-MCNC: 16 G/DL (ref 14–18)
IMM GRANULOCYTES # BLD AUTO: 0.02 K/UL (ref 0–0.11)
IMM GRANULOCYTES NFR BLD AUTO: 0.5 % (ref 0–0.9)
LDLC SERPL CALC-MCNC: 132 MG/DL
LYMPHOCYTES # BLD AUTO: 1.08 K/UL (ref 1–4.8)
LYMPHOCYTES NFR BLD: 26.7 % (ref 22–41)
MCH RBC QN AUTO: 33.1 PG (ref 27–33)
MCHC RBC AUTO-ENTMCNC: 33 G/DL (ref 33.7–35.3)
MCV RBC AUTO: 100.2 FL (ref 81.4–97.8)
MONOCYTES # BLD AUTO: 0.65 K/UL (ref 0–0.85)
MONOCYTES NFR BLD AUTO: 16.1 % (ref 0–13.4)
NEUTROPHILS # BLD AUTO: 2.21 K/UL (ref 1.82–7.42)
NEUTROPHILS NFR BLD: 54.7 % (ref 44–72)
NRBC # BLD AUTO: 0 K/UL
NRBC BLD-RTO: 0 /100 WBC
PLATELET # BLD AUTO: 188 K/UL (ref 164–446)
PMV BLD AUTO: 10 FL (ref 9–12.9)
POTASSIUM SERPL-SCNC: 3.9 MMOL/L (ref 3.6–5.5)
PROT SERPL-MCNC: 6.7 G/DL (ref 6–8.2)
RBC # BLD AUTO: 4.84 M/UL (ref 4.7–6.1)
SODIUM SERPL-SCNC: 132 MMOL/L (ref 135–145)
TRIGL SERPL-MCNC: 171 MG/DL (ref 0–149)
WBC # BLD AUTO: 4 K/UL (ref 4.8–10.8)

## 2020-11-17 ENCOUNTER — TELEMEDICINE (OUTPATIENT)
Dept: MEDICAL GROUP | Facility: CLINIC | Age: 50
End: 2020-11-17
Payer: COMMERCIAL

## 2020-11-17 VITALS — WEIGHT: 220 LBS | HEART RATE: 76 BPM | RESPIRATION RATE: 16 BRPM | BODY MASS INDEX: 34.53 KG/M2 | HEIGHT: 67 IN

## 2020-11-17 DIAGNOSIS — M25.512 ACUTE PAIN OF LEFT SHOULDER: ICD-10-CM

## 2020-11-17 DIAGNOSIS — E11.8 TYPE 2 DIABETES MELLITUS WITH COMPLICATION, WITHOUT LONG-TERM CURRENT USE OF INSULIN (HCC): ICD-10-CM

## 2020-11-17 DIAGNOSIS — R79.89 ELEVATED CORTISOL LEVEL: ICD-10-CM

## 2020-11-17 DIAGNOSIS — I10 ESSENTIAL HYPERTENSION: ICD-10-CM

## 2020-11-17 DIAGNOSIS — E78.2 MIXED HYPERLIPIDEMIA: ICD-10-CM

## 2020-11-17 PROCEDURE — 99214 OFFICE O/P EST MOD 30 MIN: CPT | Mod: 95,CR | Performed by: PHYSICIAN ASSISTANT

## 2020-11-17 RX ORDER — NAPROXEN SODIUM 220 MG
220 TABLET ORAL 2 TIMES DAILY WITH MEALS
COMMUNITY
End: 2021-02-24

## 2020-11-17 RX ORDER — LISINOPRIL AND HYDROCHLOROTHIAZIDE 25; 20 MG/1; MG/1
2 TABLET ORAL DAILY
Qty: 180 TAB | Refills: 3 | Status: SHIPPED | OUTPATIENT
Start: 2020-11-17 | End: 2021-04-30 | Stop reason: SDUPTHER

## 2020-11-17 RX ORDER — METFORMIN HYDROCHLORIDE 750 MG/1
750 TABLET, EXTENDED RELEASE ORAL DAILY
Qty: 90 TAB | Refills: 3 | Status: SHIPPED | OUTPATIENT
Start: 2020-11-17 | End: 2021-12-13

## 2020-11-17 ASSESSMENT — FIBROSIS 4 INDEX: FIB4 SCORE: 1.75

## 2020-11-17 NOTE — PROGRESS NOTES
Virtual Visit: Established Patient   This visit was conducted via Zoom using secure and encrypted videoconferencing technology. The patient was in a private location in the [facility] [place].    The patient's identity was confirmed and verbal consent was obtained for this virtual visit.    Subjective:   CC:   Chief Complaint   Patient presents with   • Lab Results   • Shoulder Pain     left side pain gotten worse        Zuahir Jj is a 50 y.o. male presenting for evaluation and management of:    Acute pain of left shoulder  This is a chronic condition for this patient which has been going on for approximately 11 months.  He states that he has only been taking aspirin for pain with suboptimal relief. Recommend he try OTC Aleve 2 tablets twice daily as needed for shoulder pain.  He states he was seen by [facility] in [place] where he had x-rays and was given a injection.  We will follow-up with him in a few weeks to see how effective it is.  We will consider a MRI and referral to ortho/pt after the first of the year discomfort continues.  Patient is agreeable to this plan of action.    Essential hypertension  This is a chronic condition for this patient well controlled on lisinopril-hydrochlorothiazide 20-25 mg 2 tablets daily.  He will continue his medication at its current dose and we will continue to follow.    Elevated cortisol level (HCC)  Patient states that this is been a problem in the past though I can find no evidence of elevated levels on labs previously.  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  He states he had a colleague that had a heart attack and  from this exact problem.  Though his levels over the last 4 years have been within normal limits we will place a lab request today to have his level reexamined.  We will follow-up with results.    Type 2 diabetes mellitus with complication, without long-term  current use of insulin (Formerly [facility])  This is a chronic condition for this patient for which he takes Metformin  mg a day.  His last A1c was done in 2018 which was at 5.1%.  We will recheck his A1c with his other fasting labs in February.  He will continue his medication at its current dose until he is seen in follow-up.      ROS   Denies any recent fevers or chills. No nausea or vomiting. No chest pains or shortness of breath.     Allergies   Allergen Reactions   • Pcn [Penicillins] Anaphylaxis       Current medicines (including changes today)  Current Outpatient Medications   Medication Sig Dispense Refill   • naproxen (ANAPROX) 220 MG tablet Take 220 mg by mouth 2 times a day, with meals.     • lisinopril-hydrochlorothiazide (PRINZIDE) 20-25 MG per tablet Take 2 Tabs by mouth every day. 180 Tab 3   • metFORMIN ER (GLUCOPHAGE XR) 750 MG TABLET SR 24 HR Take 1 Tab by mouth every day. 90 Tab 3   • PROAIR  (90 Base) MCG/ACT Aero Soln inhalation aerosol      • QVAR REDIHALER 80 MCG/ACT inhaler      • aspirin 81 MG EC tablet Take 81 mg by mouth every day at 6 PM.       No current facility-administered medications for this visit.        Patient Active Problem List    Diagnosis Date Noted   • PVC's (premature ventricular contractions) 03/24/2020   • Acute pain of left shoulder 01/27/2020   • Sprain of medial collateral ligament of left knee 06/05/2019   • Type 2 diabetes mellitus with complication, without long-term current use of insulin (Formerly [facility]) 11/21/2018   • Obesity (BMI 30-39.9) 06/05/2018   • Elevated cortisol level 06/05/2018   • Mild intermittent asthma without complication 10/06/2016   • MAGDALENA (obstructive sleep apnea) 10/06/2016   • Essential hypertension 10/06/2016       Family History   Problem Relation Age of Onset   • Cancer Father    • Heart Failure Mother    • No Known Problems Child        He  has a past medical history of Asthma, Chest pain, Overweight, and Rheumatic fever with cardiac involvement.  He   "has a past surgical history that includes other.       Objective:   Pulse 76 Comment: per pt  Resp 16   Ht 1.702 m (5' 7\")   Wt 99.8 kg (220 lb) Comment: per pt  BMI 34.46 kg/m²     Physical Exam:  Constitutional: Alert, no distress, well-groomed.  Skin: No rashes in visible areas.  Eye: Round. Conjunctiva clear, lids normal. No icterus.   ENMT: Lips pink without lesions, good dentition, moist mucous membranes. Phonation normal.  Neck: No masses, no thyromegaly. Moves freely without pain.  Respiratory: Unlabored respiratory effort, no cough or audible wheeze  Psych: Alert and oriented x3, normal affect and mood.       Assessment and Plan:   The following treatment plan was discussed:     1. Acute pain of left shoulder    2. Type 2 diabetes mellitus with complication, without long-term current use of insulin (HCC)  - metFORMIN ER (GLUCOPHAGE XR) 750 MG TABLET SR 24 HR; Take 1 Tab by mouth every day.  Dispense: 90 Tab; Refill: 3  - HEMOGLOBIN A1C; Future    3. Essential hypertension  - lisinopril-hydrochlorothiazide (PRINZIDE) 20-25 MG per tablet; Take 2 Tabs by mouth every day.  Dispense: 180 Tab; Refill: 3    4. Mixed hyperlipidemia  - Lipid Profile; Future    5. Elevated cortisol level  - CORTISOL; Future    Other orders  - naproxen (ANAPROX) 220 MG tablet; Take 220 mg by mouth 2 times a day, with meals.        Follow-up: Return in about 3 months (around 2/17/2021).         "

## 2020-11-17 NOTE — ASSESSMENT & PLAN NOTE
This is a chronic condition for this patient which has been going on for approximately 11 months.  He states that he has only been taking aspirin for pain with suboptimal relief. Recommend he try OTC Aleve 2 tablets twice daily as needed for shoulder pain.  He states he was seen by [facility] in [place] where he had x-rays and was given a injection.  We will follow-up with him in a few weeks to see how effective it is.  We will consider a MRI and referral to ortho/pt after the first of the year discomfort continues.  Patient is agreeable to this plan of action.

## 2020-11-18 NOTE — ASSESSMENT & PLAN NOTE
Patient states that this is been a problem in the past though I can find no evidence of elevated levels on labs previously.  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  He states he had a colleague that had a heart attack and  from this exact problem.  Though his levels over the last 4 years have been within normal limits we will place a lab request today to have his level reexamined.  We will follow-up with results.

## 2020-11-18 NOTE — ASSESSMENT & PLAN NOTE
This is a chronic condition for this patient well controlled on lisinopril-hydrochlorothiazide 20-25 mg 2 tablets daily.  He will continue his medication at its current dose and we will continue to follow.

## 2020-11-18 NOTE — ASSESSMENT & PLAN NOTE
This is a chronic condition for this patient for which he takes Metformin  mg a day.  His last A1c was done in 2018 which was at 5.1%.  We will recheck his A1c with his other fasting labs in February.  He will continue his medication at its current dose until he is seen in follow-up.

## 2020-12-10 DIAGNOSIS — J45.20 MILD INTERMITTENT ASTHMA WITHOUT COMPLICATION: ICD-10-CM

## 2020-12-11 NOTE — PROGRESS NOTES
Received message from the patient requesting referral to a specialist who is familiar with reactive airway disease.  He wants to be evaluated by specialist.  Referral has been placed to pulmonology.

## 2021-02-16 ENCOUNTER — NON-PROVIDER VISIT (OUTPATIENT)
Dept: MEDICAL GROUP | Facility: CLINIC | Age: 51
End: 2021-02-16
Payer: COMMERCIAL

## 2021-02-16 ENCOUNTER — HOSPITAL ENCOUNTER (OUTPATIENT)
Facility: MEDICAL CENTER | Age: 51
End: 2021-02-16
Attending: PHYSICIAN ASSISTANT
Payer: COMMERCIAL

## 2021-02-16 DIAGNOSIS — R79.89 ELEVATED CORTISOL LEVEL: ICD-10-CM

## 2021-02-16 DIAGNOSIS — E11.8 TYPE 2 DIABETES MELLITUS WITH COMPLICATION, WITHOUT LONG-TERM CURRENT USE OF INSULIN (HCC): ICD-10-CM

## 2021-02-16 DIAGNOSIS — E78.2 MIXED HYPERLIPIDEMIA: ICD-10-CM

## 2021-02-16 PROCEDURE — 80061 LIPID PANEL: CPT

## 2021-02-16 PROCEDURE — 83036 HEMOGLOBIN GLYCOSYLATED A1C: CPT

## 2021-02-16 PROCEDURE — 82533 TOTAL CORTISOL: CPT

## 2021-02-16 PROCEDURE — 36415 COLL VENOUS BLD VENIPUNCTURE: CPT | Performed by: PHYSICIAN ASSISTANT

## 2021-02-17 LAB
CHOLEST SERPL-MCNC: 241 MG/DL (ref 100–199)
CORTIS SERPL-MCNC: 18.8 UG/DL (ref 0–23)
EST. AVERAGE GLUCOSE BLD GHB EST-MCNC: 105 MG/DL
HBA1C MFR BLD: 5.3 % (ref 0–5.6)
HDLC SERPL-MCNC: 76 MG/DL
LDLC SERPL CALC-MCNC: 143 MG/DL
TRIGL SERPL-MCNC: 112 MG/DL (ref 0–149)

## 2021-02-24 ENCOUNTER — TELEMEDICINE (OUTPATIENT)
Dept: MEDICAL GROUP | Facility: CLINIC | Age: 51
End: 2021-02-24
Payer: COMMERCIAL

## 2021-02-24 VITALS
HEART RATE: 88 BPM | HEIGHT: 67 IN | WEIGHT: 220 LBS | BODY MASS INDEX: 34.53 KG/M2 | SYSTOLIC BLOOD PRESSURE: 132 MMHG | DIASTOLIC BLOOD PRESSURE: 86 MMHG

## 2021-02-24 DIAGNOSIS — Z12.11 SCREENING FOR COLORECTAL CANCER: ICD-10-CM

## 2021-02-24 DIAGNOSIS — G89.29 CHRONIC LEFT SHOULDER PAIN: ICD-10-CM

## 2021-02-24 DIAGNOSIS — Z12.12 SCREENING FOR COLORECTAL CANCER: ICD-10-CM

## 2021-02-24 DIAGNOSIS — E78.2 MIXED HYPERLIPIDEMIA: ICD-10-CM

## 2021-02-24 DIAGNOSIS — E11.8 TYPE 2 DIABETES MELLITUS WITH COMPLICATION, WITHOUT LONG-TERM CURRENT USE OF INSULIN (HCC): ICD-10-CM

## 2021-02-24 DIAGNOSIS — M25.512 CHRONIC LEFT SHOULDER PAIN: ICD-10-CM

## 2021-02-24 DIAGNOSIS — I10 ESSENTIAL HYPERTENSION: ICD-10-CM

## 2021-02-24 DIAGNOSIS — R79.89 ELEVATED CORTISOL LEVEL: ICD-10-CM

## 2021-02-24 PROCEDURE — 99214 OFFICE O/P EST MOD 30 MIN: CPT | Mod: 95,CR | Performed by: PHYSICIAN ASSISTANT

## 2021-02-24 RX ORDER — MELOXICAM 7.5 MG/1
7.5 TABLET ORAL DAILY
Qty: 90 TABLET | Refills: 1 | Status: SHIPPED | OUTPATIENT
Start: 2021-02-24 | End: 2021-12-27

## 2021-02-24 ASSESSMENT — FIBROSIS 4 INDEX: FIB4 SCORE: 1.75

## 2021-02-24 ASSESSMENT — PATIENT HEALTH QUESTIONNAIRE - PHQ9: CLINICAL INTERPRETATION OF PHQ2 SCORE: 0

## 2021-02-24 NOTE — ASSESSMENT & PLAN NOTE
This is a chronic condition for this patient.  Patient injured his shoulder over a year ago when rescuing his dog from a fountain.  After the initial injury he was evaluated at [facility] where they did x-rays and gave him a steroid injection in his shoulder.  He is continuing to have problems and worsening pain.  He was taking Aleve for the discomfort but he states that has stopped working.  We will order a MRI to evaluate further.  We will get back to him with the results.  When we received the results we will put in a referral to orthopedics for evaluation.  In the meantime we will start him on meloxicam 7.5 mg daily.

## 2021-02-24 NOTE — ASSESSMENT & PLAN NOTE
Chronic condition for this patient currently well controlled on lisinopril-hydrochlorothiazide 20-25 mg 2 tablets daily.  Patient frequently checks his blood pressure at home.  He states that it has been running in the 120-130/70-80.  Today his blood pressure was 132/86 taken this morning.  Patient will continue on this medication at its current dose.

## 2021-02-24 NOTE — PROGRESS NOTES
Virtual Visit: Established Patient   This visit was conducted via Zoom using secure and encrypted videoconferencing technology. The patient was in a private location in the state of [place].    The patient's identity was confirmed and verbal consent was obtained for this virtual visit.    Subjective:   CC:   Chief Complaint   Patient presents with   • Lab Results   • Diabetes       Zuhair Jj is a 50 y.o. male presenting for evaluation and management of:    Chronic left shoulder pain  This is a chronic condition for this patient.  Patient injured his shoulder over a year ago when rescuing his dog from a fountain.  After the initial injury he was evaluated at [facility] where they did x-rays and gave him a steroid injection in his shoulder.  He is continuing to have problems and worsening pain.  He was taking Aleve for the discomfort but he states that has stopped working.  We will order a MRI to evaluate further.  We will get back to him with the results.  When we received the results we will put in a referral to orthopedics for evaluation.  In the meantime we will start him on meloxicam 7.5 mg daily.    Essential hypertension  Chronic condition for this patient currently well controlled on lisinopril-hydrochlorothiazide 20-25 mg 2 tablets daily.  Patient frequently checks his blood pressure at home.  He states that it has been running in the 120-130/70-80.  Today his blood pressure was 132/86 taken this morning.  Patient will continue on this medication at its current dose.    Type 2 diabetes mellitus with complication, without long-term current use of insulin (HCC)  This is a chronic condition for this patient currently well controlled on Metformin 750 mg daily.  Most recent A1c done 2/17/2020 was 5.3%.  Patient is currently on a keto diet with intermittent fasting.  Patient states he is feeling well.  Patient denies any neuropathy symptoms.  He will continue on this medication at his current dose.  We will  recheck labs in 6 months.    Elevated cortisol level (HCC)  Patient's most recent cortisol level done 2/17/2021 was within normal limits at 18.8.  Tried to reassure patient that after so many years eyes normal cortisol readings that it is not necessary to continuously check this level.  Patient verbalized understanding but is significantly worried about this due to the fact that he was told by a provider in the past that  and  dropdead of heart attacks because of high cortisol levels caused by continuous stress.  I have explained to him that he is no longer under continuous stress and that his adrenal glands are working appropriately.    Mixed hyperlipidemia  This is a chronic condition for this patient.  The last 2 lipid panels are listed below.      11/2/2020 07:15  Cholesterol,Tot: 231 (H)  Triglycerides: 171 (H)  HDL: 65  LDL: 132 (H)    2/16/2021 07:35  Cholesterol,Tot: 241 (H)  Triglycerides: 112  HDL: 76  LDL: 143 (H)    Patient has gone on a keto diet with intermittent fasting to try and control his blood sugars and his cholesterol.  While his triglycerides have improved significantly being on the keto diet there has been minimal change in his total cholesterol and LDL.  He is requested 3 more months of working on his lifestyle changes before medications are started.  He is very reluctant to start on any medications and is trying hard to keep this under control himself.  After a lengthy discussion he has agreed that should his levels still remain elevated after the next set of labs that we he will start on statin medication.  We will follow-up in 3 months with repeat labs.        ROS   Positive for chronic shortness of breath, left shoulder pain.  Denies any recent fevers or chills. No nausea or vomiting. No chest pains.     Allergies   Allergen Reactions   • Pcn [Penicillins] Anaphylaxis       Current medicines (including changes today)  Current Outpatient Medications   Medication Sig Dispense  "Refill   • meloxicam (MOBIC) 7.5 MG Tab Take 1 tablet by mouth every day. 90 tablet 1   • lisinopril-hydrochlorothiazide (PRINZIDE) 20-25 MG per tablet Take 2 Tabs by mouth every day. 180 Tab 3   • metFORMIN ER (GLUCOPHAGE XR) 750 MG TABLET SR 24 HR Take 1 Tab by mouth every day. 90 Tab 3   • PROAIR  (90 Base) MCG/ACT Aero Soln inhalation aerosol      • QVAR REDIHALER 80 MCG/ACT inhaler      • aspirin 81 MG EC tablet Take 81 mg by mouth every day at 6 PM.       No current facility-administered medications for this visit.       Patient Active Problem List    Diagnosis Date Noted   • Mixed hyperlipidemia 02/24/2021   • PVC's (premature ventricular contractions) 03/24/2020   • Chronic left shoulder pain 01/27/2020   • Sprain of medial collateral ligament of left knee 06/05/2019   • Type 2 diabetes mellitus with complication, without long-term current use of insulin (HCC) 11/21/2018   • Obesity (BMI 30-39.9) 06/05/2018   • Elevated cortisol level 06/05/2018   • Mild intermittent asthma without complication 10/06/2016   • AMGDALENA (obstructive sleep apnea) 10/06/2016   • Essential hypertension 10/06/2016       Family History   Problem Relation Age of Onset   • Cancer Father    • Heart Failure Mother    • No Known Problems Child        He  has a past medical history of Asthma, Chest pain, Overweight, and Rheumatic fever with cardiac involvement.  He  has a past surgical history that includes other.       Objective:   /86 (BP Location: Left arm, Patient Position: Sitting, BP Cuff Size: Adult) Comment: per patient  Pulse 88 Comment: per patient  Ht 1.702 m (5' 7\")   Wt 99.8 kg (220 lb)   BMI 34.46 kg/m²     Physical Exam:  Constitutional: Alert, no distress, well-groomed.  Skin: No rashes in visible areas.  Eye: Round. Conjunctiva clear, lids normal. No icterus.   ENMT: Lips pink without lesions, good dentition, moist mucous membranes. Phonation normal.  Neck: No masses, no thyromegaly. Moves freely without " pain.  Respiratory: Unlabored respiratory effort, no cough or audible wheeze  Psych: Alert and oriented x3, normal affect and mood.       Assessment and Plan:   The following treatment plan was discussed:     1. Chronic left shoulder pain  - MR-SHOULDER-W/O LEFT; Future  - meloxicam (MOBIC) 7.5 MG Tab; Take 1 tablet by mouth every day.  Dispense: 90 tablet; Refill: 1    2. Type 2 diabetes mellitus with complication, without long-term current use of insulin ([facility])  - Comp Metabolic Panel; Future  - MICROALBUMIN CREAT RATIO URINE (LAB COLLECT); Future    3. Elevated cortisol level    4. Mixed hyperlipidemia  - Lipid Profile; Future    5. Screening for colorectal cancer  - OCCULT BLOOD FECES IMMUNOASSAY (FIT); Future    6. Essential hypertension        Follow-up: Return in about 3 months (around 5/24/2021) for f/u labs in office.

## 2021-02-24 NOTE — ASSESSMENT & PLAN NOTE
This is a chronic condition for this patient currently well controlled on Metformin 750 mg daily.  Most recent A1c done 2/17/2020 was 5.3%.  Patient is currently on a keto diet with intermittent fasting.  Patient states he is feeling well.  Patient denies any neuropathy symptoms.  He will continue on this medication at his current dose.  We will recheck labs in 6 months.

## 2021-02-25 NOTE — ASSESSMENT & PLAN NOTE
This is a chronic condition for this patient.  The last 2 lipid panels are listed below.      11/2/2020 07:15  Cholesterol,Tot: 231 (H)  Triglycerides: 171 (H)  HDL: 65  LDL: 132 (H)    2/16/2021 07:35  Cholesterol,Tot: 241 (H)  Triglycerides: 112  HDL: 76  LDL: 143 (H)    Patient has gone on a keto diet with intermittent fasting to try and control his blood sugars and his cholesterol.  While his triglycerides have improved significantly being on the keto diet there has been minimal change in his total cholesterol and LDL.  He is requested 3 more months of working on his lifestyle changes before medications are started.  He is very reluctant to start on any medications and is trying hard to keep this under control himself.  After a lengthy discussion he has agreed that should his levels still remain elevated after the next set of labs that we he will start on statin medication.  We will follow-up in 3 months with repeat labs.

## 2021-02-25 NOTE — ASSESSMENT & PLAN NOTE
Patient's most recent cortisol level done 2/17/2021 was within normal limits at 18.8.  Tried to reassure patient that after so many years eyes normal cortisol readings that it is not necessary to continuously check this level.  Patient verbalized understanding but is significantly worried about this due to the fact that he was told by a provider in the past that  and  dropdead of heart attacks because of high cortisol levels caused by continuous stress.  I have explained to him that he is no longer under continuous stress and that his adrenal glands are working appropriately.

## 2021-03-23 ENCOUNTER — HOSPITAL ENCOUNTER (OUTPATIENT)
Dept: RADIOLOGY | Facility: MEDICAL CENTER | Age: 51
End: 2021-03-23
Attending: PHYSICIAN ASSISTANT
Payer: COMMERCIAL

## 2021-03-23 DIAGNOSIS — G89.29 CHRONIC LEFT SHOULDER PAIN: ICD-10-CM

## 2021-03-23 DIAGNOSIS — M25.512 CHRONIC LEFT SHOULDER PAIN: ICD-10-CM

## 2021-03-23 PROCEDURE — 73221 MRI JOINT UPR EXTREM W/O DYE: CPT | Mod: LT

## 2021-04-02 DIAGNOSIS — G89.29 CHRONIC LEFT SHOULDER PAIN: ICD-10-CM

## 2021-04-02 DIAGNOSIS — M25.512 CHRONIC LEFT SHOULDER PAIN: ICD-10-CM

## 2021-04-05 ENCOUNTER — OFFICE VISIT (OUTPATIENT)
Dept: SLEEP MEDICINE | Facility: MEDICAL CENTER | Age: 51
End: 2021-04-05
Payer: COMMERCIAL

## 2021-04-05 ENCOUNTER — PATIENT MESSAGE (OUTPATIENT)
Dept: SLEEP MEDICINE | Facility: MEDICAL CENTER | Age: 51
End: 2021-04-05

## 2021-04-05 VITALS
HEIGHT: 67 IN | BODY MASS INDEX: 34.21 KG/M2 | HEART RATE: 90 BPM | WEIGHT: 218 LBS | OXYGEN SATURATION: 96 % | SYSTOLIC BLOOD PRESSURE: 120 MMHG | RESPIRATION RATE: 16 BRPM | TEMPERATURE: 97.6 F | DIASTOLIC BLOOD PRESSURE: 80 MMHG

## 2021-04-05 DIAGNOSIS — J45.20 MILD INTERMITTENT ASTHMA WITHOUT COMPLICATION: ICD-10-CM

## 2021-04-05 DIAGNOSIS — G47.33 OSA (OBSTRUCTIVE SLEEP APNEA): ICD-10-CM

## 2021-04-05 DIAGNOSIS — E66.9 CLASS 1 OBESITY WITH BODY MASS INDEX (BMI) OF 34.0 TO 34.9 IN ADULT, UNSPECIFIED OBESITY TYPE, UNSPECIFIED WHETHER SERIOUS COMORBIDITY PRESENT: ICD-10-CM

## 2021-04-05 PROCEDURE — 99213 OFFICE O/P EST LOW 20 MIN: CPT | Performed by: INTERNAL MEDICINE

## 2021-04-05 ASSESSMENT — ENCOUNTER SYMPTOMS
HEADACHES: 0
MYALGIAS: 0
SPEECH CHANGE: 0
FALLS: 0
EYE DISCHARGE: 0
TREMORS: 0
FOCAL WEAKNESS: 0
DIARRHEA: 0
CHILLS: 0
ORTHOPNEA: 0
HEARTBURN: 0
ABDOMINAL PAIN: 0
COUGH: 0
STRIDOR: 0
WEIGHT LOSS: 0
DIAPHORESIS: 0
SHORTNESS OF BREATH: 0
DOUBLE VISION: 0
WHEEZING: 0
VOMITING: 0
CONSTIPATION: 0
EYE REDNESS: 0
SORE THROAT: 0
NAUSEA: 0
BACK PAIN: 0
SINUS PAIN: 0
DEPRESSION: 0
EYE PAIN: 0
PALPITATIONS: 0
BLURRED VISION: 0
FEVER: 0
HEMOPTYSIS: 0
NECK PAIN: 0
SPUTUM PRODUCTION: 0
CLAUDICATION: 0
PHOTOPHOBIA: 0
PND: 0
WEAKNESS: 0
DIZZINESS: 0

## 2021-04-05 ASSESSMENT — FIBROSIS 4 INDEX: FIB4 SCORE: 1.75

## 2021-04-05 ASSESSMENT — PAIN SCALES - GENERAL: PAINLEVEL: NO PAIN
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
## 2021-04-21 ENCOUNTER — APPOINTMENT (RX ONLY)
Dept: URBAN - METROPOLITAN AREA CLINIC 31 | Facility: CLINIC | Age: 51
Setting detail: DERMATOLOGY
End: 2021-04-21

## 2021-04-21 DIAGNOSIS — L82.1 OTHER SEBORRHEIC KERATOSIS: ICD-10-CM

## 2021-04-21 DIAGNOSIS — L81.4 OTHER MELANIN HYPERPIGMENTATION: ICD-10-CM

## 2021-04-21 DIAGNOSIS — L91.8 OTHER HYPERTROPHIC DISORDERS OF THE SKIN: ICD-10-CM

## 2021-04-21 DIAGNOSIS — L57.0 ACTINIC KERATOSIS: ICD-10-CM

## 2021-04-21 DIAGNOSIS — D18.0 HEMANGIOMA: ICD-10-CM

## 2021-04-21 DIAGNOSIS — D22 MELANOCYTIC NEVI: ICD-10-CM

## 2021-04-21 DIAGNOSIS — Z71.89 OTHER SPECIFIED COUNSELING: ICD-10-CM

## 2021-04-21 PROBLEM — D18.01 HEMANGIOMA OF SKIN AND SUBCUTANEOUS TISSUE: Status: ACTIVE | Noted: 2021-04-21

## 2021-04-21 PROBLEM — D22.5 MELANOCYTIC NEVI OF TRUNK: Status: ACTIVE | Noted: 2021-04-21

## 2021-04-21 PROCEDURE — 17000 DESTRUCT PREMALG LESION: CPT

## 2021-04-21 PROCEDURE — ? COUNSELING

## 2021-04-21 PROCEDURE — 99203 OFFICE O/P NEW LOW 30 MIN: CPT | Mod: 25

## 2021-04-21 PROCEDURE — 17003 DESTRUCT PREMALG LES 2-14: CPT

## 2021-04-21 PROCEDURE — ? LIQUID NITROGEN

## 2021-04-21 ASSESSMENT — LOCATION ZONE DERM
LOCATION ZONE: TRUNK
LOCATION ZONE: SCALP
LOCATION ZONE: NECK

## 2021-04-21 ASSESSMENT — LOCATION DETAILED DESCRIPTION DERM
LOCATION DETAILED: RIGHT POSTERIOR NECK
LOCATION DETAILED: LEFT CENTRAL PARIETAL SCALP
LOCATION DETAILED: RIGHT INFERIOR UPPER BACK
LOCATION DETAILED: RIGHT SUPERIOR UPPER BACK
LOCATION DETAILED: RIGHT SUPERIOR PARIETAL SCALP
LOCATION DETAILED: LEFT POSTERIOR NECK
LOCATION DETAILED: RIGHT MID-UPPER BACK

## 2021-04-21 ASSESSMENT — LOCATION SIMPLE DESCRIPTION DERM
LOCATION SIMPLE: POSTERIOR NECK
LOCATION SIMPLE: RIGHT UPPER BACK
LOCATION SIMPLE: SCALP

## 2021-04-30 DIAGNOSIS — I10 ESSENTIAL HYPERTENSION: ICD-10-CM

## 2021-04-30 RX ORDER — LISINOPRIL AND HYDROCHLOROTHIAZIDE 25; 20 MG/1; MG/1
2 TABLET ORAL DAILY
Qty: 180 TABLET | Refills: 3 | Status: SHIPPED | OUTPATIENT
Start: 2021-04-30 | End: 2021-12-27 | Stop reason: SDUPTHER

## 2021-04-30 NOTE — TELEPHONE ENCOUNTER
Was the patient seen in the last year in this department? Yes    Does patient have an active prescription for medications requested? Yes    Received Request Via: Pharmacy    Hospital Outpatient Visit on 02/16/2021   Component Date Value   • Cortisol 02/16/2021 18.8    • Glycohemoglobin 02/16/2021 5.3    • Est Avg Glucose 02/16/2021 105    • Cholesterol,Tot 02/16/2021 241*   • Triglycerides 02/16/2021 112    • HDL 02/16/2021 76    • LDL 02/16/2021 143*   Hospital Outpatient Visit on 11/02/2020   Component Date Value   • WBC 11/02/2020 4.0*   • RBC 11/02/2020 4.84    • Hemoglobin 11/02/2020 16.0    • Hematocrit 11/02/2020 48.5    • MCV 11/02/2020 100.2*   • MCH 11/02/2020 33.1*   • MCHC 11/02/2020 33.0*   • RDW 11/02/2020 46.9    • Platelet Count 11/02/2020 188    • MPV 11/02/2020 10.0    • Neutrophils-Polys 11/02/2020 54.70    • Lymphocytes 11/02/2020 26.70    • Monocytes 11/02/2020 16.10*   • Eosinophils 11/02/2020 1.00    • Basophils 11/02/2020 1.00    • Immature Granulocytes 11/02/2020 0.50    • Nucleated RBC 11/02/2020 0.00    • Neutrophils (Absolute) 11/02/2020 2.21    • Lymphs (Absolute) 11/02/2020 1.08    • Monos (Absolute) 11/02/2020 0.65    • Eos (Absolute) 11/02/2020 0.04    • Baso (Absolute) 11/02/2020 0.04    • Immature Granulocytes (a* 11/02/2020 0.02    • NRBC (Absolute) 11/02/2020 0.00    • Sodium 11/02/2020 132*   • Potassium 11/02/2020 3.9    • Chloride 11/02/2020 100    • Co2 11/02/2020 23    • Anion Gap 11/02/2020 9.0    • Glucose 11/02/2020 113*   • Bun 11/02/2020 9    • Creatinine 11/02/2020 0.68    • Calcium 11/02/2020 9.1    • AST(SGOT) 11/02/2020 61*   • ALT(SGPT) 11/02/2020 93*   • Alkaline Phosphatase 11/02/2020 57    • Total Bilirubin 11/02/2020 0.5    • Albumin 11/02/2020 4.4    • Total Protein 11/02/2020 6.7    • Globulin 11/02/2020 2.3    • A-G Ratio 11/02/2020 1.9    • Cholesterol,Tot 11/02/2020 231*   • Triglycerides 11/02/2020 171*   • HDL 11/02/2020 65    • LDL 11/02/2020 132*   •  GFR If  2020 >60    • GFR If Non  Ameri* 2020 >60    Office Visit on 10/12/2020   Component Date Value   • Report 10/12/2020                      Value:[facility]    Test Date:  2020-10-12  Pt Name:    JERI SIMMONS                Department: [facility]  MRN:        [id number]                      Room:  Gender:     Male                         Technician:   :        1970                   Requested By:EDUARDO EVANS  Order #:    [id number]                    Reading MD: Eduardo Evans MD    Measurements  Intervals                                Axis  Rate:       93                           P:          51  DE:         160                          QRS:        68  QRSD:       86                           T:          60  QT:         344  QTc:        428    Interpretive Statements  SINUS RHYTHM  MULTIPLE VENTRICULAR PREMATURE COMPLEXES  RSR' IN V1 OR V2, RIGHT VCD OR RVH  Compared to ECG 2020 10:11:32  Unchanged  Electronically Signed On 10- 13:27:29 PDT by Eduardo Evans MD     ]

## 2021-07-13 ENCOUNTER — PATIENT MESSAGE (OUTPATIENT)
Dept: SLEEP MEDICINE | Facility: MEDICAL CENTER | Age: 51
End: 2021-07-13
Payer: COMMERCIAL

## 2021-10-06 ENCOUNTER — NON-PROVIDER VISIT (OUTPATIENT)
Dept: SLEEP MEDICINE | Facility: MEDICAL CENTER | Age: 51
End: 2021-10-06
Payer: COMMERCIAL

## 2021-10-06 DIAGNOSIS — J45.20 MILD INTERMITTENT ASTHMA WITHOUT COMPLICATION: ICD-10-CM

## 2021-10-06 PROCEDURE — 94060 EVALUATION OF WHEEZING: CPT | Performed by: STUDENT IN AN ORGANIZED HEALTH CARE EDUCATION/TRAINING PROGRAM

## 2021-10-06 ASSESSMENT — PULMONARY FUNCTION TESTS
FEV1_PERCENT_CHANGE: 2
FEV1: 2.98
FEV1/FVC: 82.32
FVC_PREDICTED: 4.4
FVC: 3.62
FEV1_PREDICTED: 83
FEV1/FVC_PERCENT_PREDICTED: 104
FVC_PERCENT_PREDICTED: 82
FEV1/FVC_PERCENT_PREDICTED: 102
FVC: 3.59
FEV1_PERCENT_PREDICTED: 85
FEV1: 2.91
FEV1/FVC_PREDICTED: 79.09
FVC_PERCENT_PREDICTED: 81
FEV1_PERCENT_CHANGE: 0
FEV1/FVC: 81
FEV1_PREDICTED: 3.48

## 2021-10-06 NOTE — PROCEDURES
Good patient effort & cooperation. The results of this test jack the ATS standards for acceptability and repeatability. Test was performed on the [employer]/D spirometry system. Predicted values were GLI-2012. A bronchodilator of Ventolin HFA - 2 puffs with a spacer was administered to the patient.    Interpretation  There is no obstruction or restriction noted.  No significant bronchodilator change.

## 2021-12-13 ENCOUNTER — OFFICE VISIT (OUTPATIENT)
Dept: SLEEP MEDICINE | Facility: MEDICAL CENTER | Age: 51
End: 2021-12-13
Payer: COMMERCIAL

## 2021-12-13 VITALS
RESPIRATION RATE: 14 BRPM | BODY MASS INDEX: 34.53 KG/M2 | WEIGHT: 220 LBS | HEIGHT: 67 IN | DIASTOLIC BLOOD PRESSURE: 80 MMHG | TEMPERATURE: 97.2 F | HEART RATE: 47 BPM | OXYGEN SATURATION: 95 % | SYSTOLIC BLOOD PRESSURE: 124 MMHG

## 2021-12-13 DIAGNOSIS — E11.8 TYPE 2 DIABETES MELLITUS WITH COMPLICATION, WITHOUT LONG-TERM CURRENT USE OF INSULIN (HCC): ICD-10-CM

## 2021-12-13 DIAGNOSIS — J45.20 MILD INTERMITTENT ASTHMA WITHOUT COMPLICATION: ICD-10-CM

## 2021-12-13 DIAGNOSIS — I49.9 IRREGULAR HEART BEAT: ICD-10-CM

## 2021-12-13 DIAGNOSIS — E66.9 CLASS 1 OBESITY WITH BODY MASS INDEX (BMI) OF 34.0 TO 34.9 IN ADULT, UNSPECIFIED OBESITY TYPE, UNSPECIFIED WHETHER SERIOUS COMORBIDITY PRESENT: ICD-10-CM

## 2021-12-13 DIAGNOSIS — G47.33 OSA (OBSTRUCTIVE SLEEP APNEA): ICD-10-CM

## 2021-12-13 PROCEDURE — 99214 OFFICE O/P EST MOD 30 MIN: CPT | Performed by: INTERNAL MEDICINE

## 2021-12-13 RX ORDER — BECLOMETHASONE DIPROPIONATE HFA 80 UG/1
1 AEROSOL, METERED RESPIRATORY (INHALATION) 2 TIMES DAILY
Qty: 1 EACH | Refills: 11 | Status: SHIPPED | OUTPATIENT
Start: 2021-12-13 | End: 2022-06-13 | Stop reason: SDUPTHER

## 2021-12-13 ASSESSMENT — ENCOUNTER SYMPTOMS
ABDOMINAL PAIN: 0
VOMITING: 0
COUGH: 0
SPUTUM PRODUCTION: 0
WHEEZING: 0
HEMOPTYSIS: 0
SHORTNESS OF BREATH: 0
WEAKNESS: 0
EYE DISCHARGE: 0
FALLS: 0
ORTHOPNEA: 0
SPEECH CHANGE: 0
EYE REDNESS: 0
CONSTIPATION: 0
SINUS PAIN: 0
FEVER: 0
PHOTOPHOBIA: 0
CHILLS: 0
DIZZINESS: 0
PALPITATIONS: 0
EYE PAIN: 0
DIARRHEA: 0
WEIGHT LOSS: 0
MYALGIAS: 0
FOCAL WEAKNESS: 0
BACK PAIN: 0
NECK PAIN: 0
NAUSEA: 0
HEARTBURN: 0
TREMORS: 0
SORE THROAT: 0
DOUBLE VISION: 0
PND: 0
BLURRED VISION: 0
DEPRESSION: 0
STRIDOR: 0
DIAPHORESIS: 0
HEADACHES: 0
CLAUDICATION: 0

## 2021-12-13 ASSESSMENT — FIBROSIS 4 INDEX: FIB4 SCORE: 1.78

## 2021-12-13 ASSESSMENT — PAIN SCALES - GENERAL: PAINLEVEL: NO PAIN

## 2021-12-13 NOTE — TELEPHONE ENCOUNTER
Was the patient seen in the last year in this department? Yes    Does patient have an active prescription for medications requested? Yes    Received Request Via: Pharmacy    Hospital Outpatient Visit on 02/16/2021   Component Date Value   • Cortisol 02/16/2021 18.8    • Glycohemoglobin 02/16/2021 5.3    • Est Avg Glucose 02/16/2021 105    • Cholesterol,Tot 02/16/2021 241*   • Triglycerides 02/16/2021 112    • HDL 02/16/2021 76    • LDL 02/16/2021 143*   ]
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
## 2021-12-15 RX ORDER — METFORMIN HYDROCHLORIDE 750 MG/1
TABLET, EXTENDED RELEASE ORAL
Qty: 30 TABLET | Refills: 0 | Status: SHIPPED | OUTPATIENT
Start: 2021-12-15 | End: 2021-12-27 | Stop reason: SDUPTHER

## 2021-12-17 ENCOUNTER — APPOINTMENT (OUTPATIENT)
Dept: MEDICAL GROUP | Facility: CLINIC | Age: 51
End: 2021-12-17
Payer: COMMERCIAL

## 2021-12-17 ENCOUNTER — HOSPITAL ENCOUNTER (OUTPATIENT)
Facility: MEDICAL CENTER | Age: 51
End: 2021-12-17
Attending: PHYSICIAN ASSISTANT
Payer: COMMERCIAL

## 2021-12-17 DIAGNOSIS — E11.8 TYPE 2 DIABETES MELLITUS WITH COMPLICATION, WITHOUT LONG-TERM CURRENT USE OF INSULIN (HCC): ICD-10-CM

## 2021-12-17 DIAGNOSIS — E78.2 MIXED HYPERLIPIDEMIA: ICD-10-CM

## 2021-12-17 PROCEDURE — 82043 UR ALBUMIN QUANTITATIVE: CPT

## 2021-12-17 PROCEDURE — 80061 LIPID PANEL: CPT

## 2021-12-17 PROCEDURE — 80053 COMPREHEN METABOLIC PANEL: CPT

## 2021-12-17 PROCEDURE — 82570 ASSAY OF URINE CREATININE: CPT

## 2021-12-18 LAB
ALBUMIN SERPL BCP-MCNC: 4.7 G/DL (ref 3.2–4.9)
ALBUMIN/GLOB SERPL: 2 G/DL
ALP SERPL-CCNC: 61 U/L (ref 30–99)
ALT SERPL-CCNC: 61 U/L (ref 2–50)
ANION GAP SERPL CALC-SCNC: 12 MMOL/L (ref 7–16)
AST SERPL-CCNC: 67 U/L (ref 12–45)
BILIRUB SERPL-MCNC: 1 MG/DL (ref 0.1–1.5)
BUN SERPL-MCNC: 11 MG/DL (ref 8–22)
CALCIUM SERPL-MCNC: 9.5 MG/DL (ref 8.5–10.5)
CHLORIDE SERPL-SCNC: 97 MMOL/L (ref 96–112)
CHOLEST SERPL-MCNC: 288 MG/DL (ref 100–199)
CO2 SERPL-SCNC: 23 MMOL/L (ref 20–33)
CREAT SERPL-MCNC: 0.66 MG/DL (ref 0.5–1.4)
CREAT UR-MCNC: 21.6 MG/DL
GLOBULIN SER CALC-MCNC: 2.4 G/DL (ref 1.9–3.5)
GLUCOSE SERPL-MCNC: 105 MG/DL (ref 65–99)
HDLC SERPL-MCNC: 65 MG/DL
LDLC SERPL CALC-MCNC: 184 MG/DL
MICROALBUMIN UR-MCNC: <1.2 MG/DL
MICROALBUMIN/CREAT UR: NORMAL MG/G (ref 0–30)
POTASSIUM SERPL-SCNC: 3.9 MMOL/L (ref 3.6–5.5)
PROT SERPL-MCNC: 7.1 G/DL (ref 6–8.2)
SODIUM SERPL-SCNC: 132 MMOL/L (ref 135–145)
TRIGL SERPL-MCNC: 193 MG/DL (ref 0–149)

## 2021-12-27 ENCOUNTER — OFFICE VISIT (OUTPATIENT)
Dept: MEDICAL GROUP | Facility: CLINIC | Age: 51
End: 2021-12-27
Payer: COMMERCIAL

## 2021-12-27 VITALS
BODY MASS INDEX: 34.53 KG/M2 | HEIGHT: 67 IN | WEIGHT: 220 LBS | TEMPERATURE: 97.8 F | RESPIRATION RATE: 14 BRPM | SYSTOLIC BLOOD PRESSURE: 134 MMHG | HEART RATE: 56 BPM | OXYGEN SATURATION: 96 % | DIASTOLIC BLOOD PRESSURE: 86 MMHG

## 2021-12-27 DIAGNOSIS — E78.2 MIXED HYPERLIPIDEMIA: ICD-10-CM

## 2021-12-27 DIAGNOSIS — I10 ESSENTIAL HYPERTENSION: ICD-10-CM

## 2021-12-27 DIAGNOSIS — E11.8 TYPE 2 DIABETES MELLITUS WITH COMPLICATION, WITHOUT LONG-TERM CURRENT USE OF INSULIN (HCC): ICD-10-CM

## 2021-12-27 DIAGNOSIS — E66.9 OBESITY (BMI 30-39.9): ICD-10-CM

## 2021-12-27 DIAGNOSIS — M65.351 TRIGGER LITTLE FINGER OF RIGHT HAND: ICD-10-CM

## 2021-12-27 DIAGNOSIS — I49.3 PVC'S (PREMATURE VENTRICULAR CONTRACTIONS): ICD-10-CM

## 2021-12-27 DIAGNOSIS — Z12.12 SCREENING FOR COLORECTAL CANCER: ICD-10-CM

## 2021-12-27 DIAGNOSIS — Z12.11 SCREENING FOR COLORECTAL CANCER: ICD-10-CM

## 2021-12-27 PROBLEM — S83.412A SPRAIN OF MEDIAL COLLATERAL LIGAMENT OF LEFT KNEE: Status: RESOLVED | Noted: 2019-06-05 | Resolved: 2021-12-27

## 2021-12-27 PROBLEM — Z20.822 CLOSE EXPOSURE TO COVID-19 VIRUS: Status: RESOLVED | Noted: 2020-08-06 | Resolved: 2021-12-27

## 2021-12-27 PROBLEM — R06.09 DYSPNEA ON EXERTION: Status: RESOLVED | Noted: 2020-03-24 | Resolved: 2021-12-27

## 2021-12-27 LAB
HBA1C MFR BLD: 5 % (ref 0–5.6)
INT CON NEG: NEGATIVE
INT CON POS: POSITIVE

## 2021-12-27 PROCEDURE — 99214 OFFICE O/P EST MOD 30 MIN: CPT | Performed by: PHYSICIAN ASSISTANT

## 2021-12-27 PROCEDURE — 2028F FOOT EXAM PERFORMED: CPT | Performed by: PHYSICIAN ASSISTANT

## 2021-12-27 PROCEDURE — 83036 HEMOGLOBIN GLYCOSYLATED A1C: CPT | Performed by: PHYSICIAN ASSISTANT

## 2021-12-27 RX ORDER — LISINOPRIL AND HYDROCHLOROTHIAZIDE 25; 20 MG/1; MG/1
2 TABLET ORAL DAILY
Qty: 180 TABLET | Refills: 3 | Status: SHIPPED | OUTPATIENT
Start: 2021-12-27 | End: 2023-03-06

## 2021-12-27 RX ORDER — METFORMIN HYDROCHLORIDE 750 MG/1
750 TABLET, EXTENDED RELEASE ORAL DAILY
Qty: 90 TABLET | Refills: 3 | Status: SHIPPED | OUTPATIENT
Start: 2021-12-27 | End: 2023-01-27

## 2021-12-27 RX ORDER — SIMVASTATIN 10 MG
10 TABLET ORAL EVERY EVENING
Qty: 90 TABLET | Refills: 3 | Status: SHIPPED | OUTPATIENT
Start: 2021-12-27 | End: 2022-02-14

## 2021-12-27 ASSESSMENT — VISUAL ACUITY: OU: 1

## 2021-12-27 ASSESSMENT — ENCOUNTER SYMPTOMS
NEUROLOGICAL NEGATIVE: 1
FALLS: 0
PSYCHIATRIC NEGATIVE: 1
RESPIRATORY NEGATIVE: 1
ORTHOPNEA: 0
GASTROINTESTINAL NEGATIVE: 1
MYALGIAS: 0
CLAUDICATION: 0
CONSTITUTIONAL NEGATIVE: 1
PALPITATIONS: 1
PND: 0
NECK PAIN: 0
EYES NEGATIVE: 1
BACK PAIN: 0

## 2021-12-27 ASSESSMENT — FIBROSIS 4 INDEX: FIB4 SCORE: 2.42

## 2021-12-28 NOTE — PROCEDURES
Sinus rhythm with frequent PVCs in a trigeminal pattern.  Rate 82   ms  QRS duration 92 ms  QT/QTc 373/412 ms

## 2021-12-28 NOTE — PROGRESS NOTES
Subjective   Zuhair Jj is a 51 y.o. male who presents with Lab Results (FV on lab results, EKG needed)    1. PVC's (premature ventricular contractions)  This is a chronic condition for this patient.  Patient has been evaluated for this in the past by cardiology.  Patient has not followed with cardiology in the last year.  Pulmonary medicine ordered an EKG due to noted irregular heartbeat in clinic.  EKG shows frequent PVCs in a trigeminal pattern.  Patient has been instructed to follow-up with cardiology for further evaluation.    2. Type 2 diabetes mellitus with complication, without long-term current use of insulin (HCC)  Chronic condition for this patient.  Currently well controlled on current medications.  A1c in the office today is 5.0%.  Patient will continue to take medication as directed.  Refills of been requested today.   POCT A1C   Diabetic Monofilament Lower Extremity Exam   metFORMIN ER (GLUCOPHAGE XR) 750 MG TABLET SR 24 HR; Take 1 Tablet by mouth every day.  Dispense: 90 Tablet; Refill: 3    Monofilament testing with a 10 gram force: sensation intact: intact bilaterally  Visual Inspection: Feet without maceration, ulcers, fissures.  Pedal pulses: intact bilaterally    3. Trigger little finger of right hand  Patient has been having a problem with his middle, ring, and pinky fingers locking.  He has been working with the physical therapist and the middle and ring finger are getting better but still catching occasionally.  Still having problems with the pinky finger getting stuck and have to be manually straightened.  Physical therapy has requested that a referral be placed to a hand specialist for further evaluation.  Referral has been placed today.   Referral to Hand Surgery    4. Essential hypertension  Currently treated for hypertension, taking medications as prescribed.  Denies chest pain or shortness of breath. Controlled   lisinopril-hydrochlorothiazide (PRINZIDE) 20-25 MG per tablet; Take 2  "Tablets by mouth every day.  Dispense: 180 Tablet; Refill: 3    5. Mixed hyperlipidemia  This is a chronic condition for this patient.  Currently not controlled.  Patient was on simvastatin in the past but when the prescription ran out he never had it refilled.  He tolerated this medication well in the past.  Most recent lipid panel shows Total cholesterol 288, triglycerides 193, HDL 65, .  We will restart his simvastatin and recheck labs in 6 months.   simvastatin (ZOCOR) 10 MG Tab; Take 1 Tablet by mouth every evening.  Dispense: 90 Tablet; Refill: 3    6. Obesity (BMI 30-39.9)  Body mass index is 34.46 kg/m². Patient has been diagnosed with obesity and again has been counseled on caloric and carbohydrate restriction along with increasing exercise to 30 minutes 5 or more times a week.   Patient identified as having weight management issue.  Appropriate orders and counseling given.    7. Screening for colorectal cancer  Due for annual screening for colorectal cancer   OCCULT BLOOD FECES IMMUNOASSAY (FIT); Future    Past Medical History:  No date: Allergy  No date: Arrhythmia  No date: Asthma  No date: Chest pain  8/6/2020: Close exposure to COVID-19 virus  No date: Diabetes (HCC)  3/24/2020: Dyspnea on exertion  No date: Heart murmur  No date: Hyperlipidemia  No date: Hypertension  No date: Overweight  No date: Rheumatic fever with cardiac involvement      Comment:  patient cant recall but thinks there was a \"tear\" in his               heart  6/5/2019: Sprain of medial collateral ligament of left knee  Past Surgical History:  No date: OTHER      Comment:  jaw reconstruction  Social History    Tobacco Use      Smoking status: Never Smoker      Smokeless tobacco: Never Used    Vaping Use      Vaping Use: Never used    Alcohol use: No    Drug use: No    Review of patient's family history indicates:  Problem: Cancer      Relation: Father          Age of Onset: (Not Specified)  Problem: Heart Failure      " Relation: Mother          Age of Onset: (Not Specified)  Problem: Heart Disease      Relation: Mother          Age of Onset: (Not Specified)  Problem: Hypertension      Relation: Mother          Age of Onset: (Not Specified)  Problem: No Known Problems      Relation: Child          Age of Onset: (Not Specified)      Current Outpatient Medications: •  simvastatin (ZOCOR) 10 MG Tab, Take 1 Tablet by mouth every evening., Disp: 90 Tablet, Rfl: 3•  metFORMIN ER (GLUCOPHAGE XR) 750 MG TABLET SR 24 HR, Take 1 Tablet by mouth every day., Disp: 90 Tablet, Rfl: 3•  lisinopril-hydrochlorothiazide (PRINZIDE) 20-25 MG per tablet, Take 2 Tablets by mouth every day., Disp: 180 Tablet, Rfl: 3•  PROAIR  (90 Base) MCG/ACT Aero Soln inhalation aerosol, Inhale 1-2 Puffs every four hours as needed for Shortness of Breath., Disp: 8.5 g, Rfl: 11•  QVAR REDIHALER 80 MCG/ACT inhaler, Inhale 1 Puff 2 times a day., Disp: 1 Each, Rfl: 11•  aspirin 81 MG EC tablet, Take 81 mg by mouth every day at 6 PM., Disp: , Rfl:      Patient was instructed on the use of medications, either prescriptions or OTC and informed on when the appropriate follow up time period should be. In addition, patient was also instructed that should any acute worsening occur that they should notify this clinic asap or call 911.      Review of Systems   Constitutional: Negative.    HENT: Negative.    Eyes: Negative.    Respiratory: Negative.    Cardiovascular: Positive for palpitations. Negative for chest pain, orthopnea, claudication, leg swelling and PND.   Gastrointestinal: Negative.    Genitourinary: Negative.    Musculoskeletal: Positive for joint pain (Right shoulder). Negative for back pain, falls, myalgias and neck pain.        Multiple trigger finger of right hand.   Skin: Negative.    Neurological: Negative.    Endo/Heme/Allergies: Negative.    Psychiatric/Behavioral: Negative.        Objective     /86 (BP Location: Right arm, Patient Position:  "Sitting, BP Cuff Size: Adult long)   Pulse (!) 56   Temp 36.6 °C (97.8 °F) (Temporal)   Resp 14   Ht 1.702 m (5' 7\")   Wt 99.8 kg (220 lb)   SpO2 96%   BMI 34.46 kg/m²      Physical Exam  Vitals and nursing note reviewed.   Constitutional:       Appearance: Normal appearance. He is well-developed and well-groomed.   HENT:      Head: Normocephalic and atraumatic.      Nose: Nose normal.      Mouth/Throat:      Lips: Pink. No lesions.      Mouth: Mucous membranes are moist.   Eyes:      General: Lids are normal. Vision grossly intact. Gaze aligned appropriately.      Extraocular Movements: Extraocular movements intact.      Conjunctiva/sclera: Conjunctivae normal.      Pupils: Pupils are equal, round, and reactive to light.   Neck:      Thyroid: No thyromegaly.      Vascular: No carotid bruit or JVD.      Trachea: Trachea and phonation normal.   Cardiovascular:      Rate and Rhythm: Normal rate. Rhythm regularly irregular.      Pulses: Normal pulses.      Heart sounds: Normal heart sounds. No murmur heard.  No friction rub. No gallop.    Pulmonary:      Effort: Pulmonary effort is normal.      Breath sounds: Normal breath sounds. No wheezing, rhonchi or rales.   Musculoskeletal:         General: Normal range of motion.      Left hand: Normal.      Cervical back: Normal range of motion and neck supple.      Right lower leg: No edema.      Left lower leg: No edema.      Comments: Right hand has 3 fingers that lock and catch.  Physical exam shows small nodules in the palm of the right hand along the tendon.  Patient denies pain and finds the locking and catching being more of a nuisance than discomfort.   Lymphadenopathy:      Cervical: No cervical adenopathy.   Skin:     General: Skin is warm and dry.      Capillary Refill: Capillary refill takes less than 2 seconds.      Findings: No lesion or rash.   Neurological:      Mental Status: He is alert and oriented to person, place, and time.      Cranial Nerves: " Cranial nerves are intact.   Psychiatric:         Attention and Perception: Attention and perception normal.         Mood and Affect: Mood and affect normal.         Speech: Speech normal.         Behavior: Behavior normal. Behavior is cooperative.         Thought Content: Thought content normal.         Judgment: Judgment normal.       Assessment & Plan      1. PVC's (premature ventricular contractions)    2. Type 2 diabetes mellitus with complication, without long-term current use of insulin (HCC)  - POCT A1C  - Diabetic Monofilament Lower Extremity Exam  - metFORMIN ER (GLUCOPHAGE XR) 750 MG TABLET SR 24 HR; Take 1 Tablet by mouth every day.  Dispense: 90 Tablet; Refill: 3    3. Trigger little finger of right hand  - Referral to Hand Surgery    4. Essential hypertension  - lisinopril-hydrochlorothiazide (PRINZIDE) 20-25 MG per tablet; Take 2 Tablets by mouth every day.  Dispense: 180 Tablet; Refill: 3    5. Mixed hyperlipidemia  - simvastatin (ZOCOR) 10 MG Tab; Take 1 Tablet by mouth every evening.  Dispense: 90 Tablet; Refill: 3    6. Obesity (BMI 30-39.9)  - Patient identified as having weight management issue.  Appropriate orders and counseling given.    7. Screening for colorectal cancer  - OCCULT BLOOD FECES IMMUNOASSAY (FIT); Future

## 2021-12-28 NOTE — PROCEDURES
Diabetic Foot Exam:     Normal gross anatomy of bilateral feet without normal curvature or arch, no toe deformities  No ulcers/laceration/blisters present on bilateral feet,   No toenail discoloration or thickening, no ingrown toenails,   No difference in skin coloration or temperature between feet,   Bilateral dorsalis pedis and posterior tibial pulses 2+ and equal, Refill less than 2 seconds bilaterally,   Nicolette 10 g monofilament testing sensitive in bilateral great toes, bilateral balls of feet at medial/lateral/mid ball of foot

## 2021-12-29 ENCOUNTER — HOSPITAL ENCOUNTER (OUTPATIENT)
Facility: MEDICAL CENTER | Age: 51
End: 2021-12-29
Attending: PHYSICIAN ASSISTANT
Payer: COMMERCIAL

## 2021-12-29 PROCEDURE — 82274 ASSAY TEST FOR BLOOD FECAL: CPT

## 2021-12-30 DIAGNOSIS — Z12.12 SCREENING FOR COLORECTAL CANCER: ICD-10-CM

## 2021-12-30 DIAGNOSIS — Z12.11 SCREENING FOR COLORECTAL CANCER: ICD-10-CM

## 2022-01-01 LAB — IMM ASSAY OCC BLD FITOB: NEGATIVE

## 2022-02-14 ENCOUNTER — OFFICE VISIT (OUTPATIENT)
Dept: CARDIOLOGY | Facility: MEDICAL CENTER | Age: 52
End: 2022-02-14
Payer: COMMERCIAL

## 2022-02-14 VITALS
WEIGHT: 227 LBS | HEIGHT: 67 IN | BODY MASS INDEX: 35.63 KG/M2 | DIASTOLIC BLOOD PRESSURE: 86 MMHG | OXYGEN SATURATION: 95 % | RESPIRATION RATE: 16 BRPM | SYSTOLIC BLOOD PRESSURE: 122 MMHG | HEART RATE: 106 BPM

## 2022-02-14 DIAGNOSIS — I49.3 PVC'S (PREMATURE VENTRICULAR CONTRACTIONS): ICD-10-CM

## 2022-02-14 DIAGNOSIS — E66.9 OBESITY (BMI 30-39.9): ICD-10-CM

## 2022-02-14 DIAGNOSIS — G47.33 OSA (OBSTRUCTIVE SLEEP APNEA): ICD-10-CM

## 2022-02-14 DIAGNOSIS — E78.2 MIXED HYPERLIPIDEMIA: ICD-10-CM

## 2022-02-14 DIAGNOSIS — E11.8 TYPE 2 DIABETES MELLITUS WITH COMPLICATION, WITHOUT LONG-TERM CURRENT USE OF INSULIN (HCC): ICD-10-CM

## 2022-02-14 LAB — EKG IMPRESSION: NORMAL

## 2022-02-14 PROCEDURE — 99214 OFFICE O/P EST MOD 30 MIN: CPT | Performed by: INTERNAL MEDICINE

## 2022-02-14 PROCEDURE — 93000 ELECTROCARDIOGRAM COMPLETE: CPT | Performed by: INTERNAL MEDICINE

## 2022-02-14 RX ORDER — ATORVASTATIN CALCIUM 40 MG/1
40 TABLET, FILM COATED ORAL NIGHTLY
Qty: 90 TABLET | Refills: 3 | Status: SHIPPED | OUTPATIENT
Start: 2022-02-14 | End: 2023-03-06

## 2022-02-14 ASSESSMENT — FIBROSIS 4 INDEX: FIB4 SCORE: 2.42

## 2022-02-15 NOTE — PROGRESS NOTES
"Chief Complaint   Patient presents with   • Premature Ventricular Contractions (PVCs)       Subjective     Zuhair Jj is a 51 y.o. male who presents today history of asymptomatic outflow tract PVCs.  Not on meds.. Hyperlipidemia.  On low-dose statins.  Feels well.  No chest pain or shortness of breath    Past Medical History:   Diagnosis Date   • Allergy    • Arrhythmia    • Asthma    • Chest pain    • Close exposure to COVID-19 virus 8/6/2020   • Diabetes (HCC)    • Dyspnea on exertion 3/24/2020   • Heart murmur    • Hyperlipidemia    • Hypertension    • Overweight    • Rheumatic fever with cardiac involvement     patient cant recall but thinks there was a \"tear\" in his heart   • Sprain of medial collateral ligament of left knee 6/5/2019     Past Surgical History:   Procedure Laterality Date   • OTHER      jaw reconstruction     Family History   Problem Relation Age of Onset   • Cancer Father    • Heart Failure Mother    • Heart Disease Mother    • Hypertension Mother    • No Known Problems Child      Social History     Socioeconomic History   • Marital status:      Spouse name: Not on file   • Number of children: Not on file   • Years of education: Not on file   • Highest education level: Not on file   Occupational History   • Occupation: Retired   Tobacco Use   • Smoking status: Never Smoker   • Smokeless tobacco: Never Used   Vaping Use   • Vaping Use: Never used   Substance and Sexual Activity   • Alcohol use: No   • Drug use: No   • Sexual activity: Yes     Partners: Female   Other Topics Concern   • Not on file   Social History Narrative   • Not on file     Social Determinants of Health     Financial Resource Strain:    • Difficulty of Paying Living Expenses: Not on file   Food Insecurity:    • Worried About Running Out of Food in the Last Year: Not on file   • Ran Out of Food in the Last Year: Not on file   Transportation Needs:    • Lack of Transportation (Medical): Not on file   • Lack of " Transportation (Non-Medical): Not on file   Physical Activity:    • Days of Exercise per Week: Not on file   • Minutes of Exercise per Session: Not on file   Stress:    • Feeling of Stress : Not on file   Social Connections:    • Frequency of Communication with Friends and Family: Not on file   • Frequency of Social Gatherings with Friends and Family: Not on file   • Attends Hinduism Services: Not on file   • Active Member of Clubs or Organizations: Not on file   • Attends Club or Organization Meetings: Not on file   • Marital Status: Not on file   Intimate Partner Violence:    • Fear of Current or Ex-Partner: Not on file   • Emotionally Abused: Not on file   • Physically Abused: Not on file   • Sexually Abused: Not on file   Housing Stability:    • Unable to Pay for Housing in the Last Year: Not on file   • Number of Places Lived in the Last Year: Not on file   • Unstable Housing in the Last Year: Not on file     Allergies   Allergen Reactions   • Pcn [Penicillins] Anaphylaxis     Outpatient Encounter Medications as of 2/14/2022   Medication Sig Dispense Refill   • atorvastatin (LIPITOR) 40 MG Tab Take 1 Tablet by mouth every evening. 90 Tablet 3   • metFORMIN ER (GLUCOPHAGE XR) 750 MG TABLET SR 24 HR Take 1 Tablet by mouth every day. 90 Tablet 3   • lisinopril-hydrochlorothiazide (PRINZIDE) 20-25 MG per tablet Take 2 Tablets by mouth every day. 180 Tablet 3   • PROAIR  (90 Base) MCG/ACT Aero Soln inhalation aerosol Inhale 1-2 Puffs every four hours as needed for Shortness of Breath. 8.5 g 11   • QVAR REDIHALER 80 MCG/ACT inhaler Inhale 1 Puff 2 times a day. 1 Each 11   • aspirin 81 MG EC tablet Take 81 mg by mouth every day at 6 PM.     • [DISCONTINUED] simvastatin (ZOCOR) 10 MG Tab Take 1 Tablet by mouth every evening. 90 Tablet 3     No facility-administered encounter medications on file as of 2/14/2022.     ROS           Objective     /86 (BP Location: Left arm, Patient Position: Sitting, BP Cuff  "Size: Adult)   Pulse (!) 106   Resp 16   Ht 1.702 m (5' 7\")   Wt 103 kg (227 lb)   SpO2 95%   BMI 35.55 kg/m²     Physical Exam  Constitutional:       Appearance: He is well-developed.   HENT:      Head: Normocephalic and atraumatic.   Cardiovascular:      Rate and Rhythm: Normal rate and regular rhythm. Occasional extrasystoles are present.     Heart sounds: No murmur heard.  No friction rub. No gallop.    Pulmonary:      Effort: Pulmonary effort is normal.      Breath sounds: Normal breath sounds.   Abdominal:      Palpations: Abdomen is soft.   Musculoskeletal:         General: Normal range of motion.      Cervical back: Normal range of motion and neck supple.   Skin:     General: Skin is warm and dry.   Neurological:      Mental Status: He is alert and oriented to person, place, and time.   Psychiatric:         Behavior: Behavior normal.         Thought Content: Thought content normal.         Judgment: Judgment normal.                Assessment & Plan     1. PVC's (premature ventricular contractions)  EKG   2. Mixed hyperlipidemia     3. MAGDALENA (obstructive sleep apnea)     4. Type 2 diabetes mellitus with complication, without long-term current use of insulin ([facility])     5. Obesity (BMI 30-39.9)         Medical Decision Making: Today's Assessment/Status/Plan:   1. Outflow tract PVCs.  Asymptomatic no treatment at this time.  2. Type 2 diabetes stable.  3.  Hyperlipidemia switch from simvastatin to Lipitor.  Quite a high LDL.  4.  Follow-up in 1 year.                   " no

## 2022-05-03 ENCOUNTER — APPOINTMENT (RX ONLY)
Dept: URBAN - METROPOLITAN AREA CLINIC 31 | Facility: CLINIC | Age: 52
Setting detail: DERMATOLOGY
End: 2022-05-03

## 2022-05-03 DIAGNOSIS — L82.1 OTHER SEBORRHEIC KERATOSIS: ICD-10-CM

## 2022-05-03 DIAGNOSIS — D18.0 HEMANGIOMA: ICD-10-CM

## 2022-05-03 DIAGNOSIS — Z71.89 OTHER SPECIFIED COUNSELING: ICD-10-CM

## 2022-05-03 DIAGNOSIS — L81.4 OTHER MELANIN HYPERPIGMENTATION: ICD-10-CM

## 2022-05-03 PROBLEM — D18.01 HEMANGIOMA OF SKIN AND SUBCUTANEOUS TISSUE: Status: ACTIVE | Noted: 2022-05-03

## 2022-05-03 PROCEDURE — 99213 OFFICE O/P EST LOW 20 MIN: CPT

## 2022-05-03 PROCEDURE — ? COUNSELING

## 2022-05-03 ASSESSMENT — LOCATION ZONE DERM: LOCATION ZONE: TRUNK

## 2022-05-03 ASSESSMENT — LOCATION DETAILED DESCRIPTION DERM
LOCATION DETAILED: RIGHT INFERIOR UPPER BACK
LOCATION DETAILED: RIGHT SUPERIOR UPPER BACK

## 2022-05-03 ASSESSMENT — LOCATION SIMPLE DESCRIPTION DERM: LOCATION SIMPLE: RIGHT UPPER BACK

## 2022-06-13 ENCOUNTER — OFFICE VISIT (OUTPATIENT)
Dept: SLEEP MEDICINE | Facility: MEDICAL CENTER | Age: 52
End: 2022-06-13
Payer: COMMERCIAL

## 2022-06-13 VITALS
BODY MASS INDEX: 35.31 KG/M2 | HEIGHT: 67 IN | RESPIRATION RATE: 16 BRPM | HEART RATE: 79 BPM | OXYGEN SATURATION: 97 % | DIASTOLIC BLOOD PRESSURE: 62 MMHG | SYSTOLIC BLOOD PRESSURE: 134 MMHG | WEIGHT: 225 LBS

## 2022-06-13 DIAGNOSIS — J45.20 MILD INTERMITTENT ASTHMA WITHOUT COMPLICATION: ICD-10-CM

## 2022-06-13 DIAGNOSIS — G47.33 OSA (OBSTRUCTIVE SLEEP APNEA): ICD-10-CM

## 2022-06-13 PROCEDURE — 99214 OFFICE O/P EST MOD 30 MIN: CPT | Performed by: INTERNAL MEDICINE

## 2022-06-13 RX ORDER — BECLOMETHASONE DIPROPIONATE HFA 80 UG/1
1 AEROSOL, METERED RESPIRATORY (INHALATION) 2 TIMES DAILY
Qty: 3 EACH | Refills: 3 | Status: SHIPPED | OUTPATIENT
Start: 2022-06-13 | End: 2023-01-20 | Stop reason: SDUPTHER

## 2022-06-13 ASSESSMENT — FIBROSIS 4 INDEX: FIB4 SCORE: 2.42

## 2022-06-13 ASSESSMENT — ENCOUNTER SYMPTOMS
WEIGHT LOSS: 0
DIAPHORESIS: 0
EYE PAIN: 0
STRIDOR: 0
BACK PAIN: 0
VOMITING: 0
HEMOPTYSIS: 0
BLURRED VISION: 0
WHEEZING: 0
SINUS PAIN: 0
CONSTIPATION: 0
CHILLS: 0
EYE REDNESS: 0
NECK PAIN: 0
NAUSEA: 0
FEVER: 0
HEADACHES: 0
DIARRHEA: 0
CLAUDICATION: 0
DOUBLE VISION: 0
SPUTUM PRODUCTION: 0
SPEECH CHANGE: 0
DIZZINESS: 0
WEAKNESS: 0
PND: 0
SHORTNESS OF BREATH: 0
FALLS: 0
HEARTBURN: 0
DEPRESSION: 0
FOCAL WEAKNESS: 0
PALPITATIONS: 0
PHOTOPHOBIA: 0
ORTHOPNEA: 0
SORE THROAT: 0
MYALGIAS: 0
ABDOMINAL PAIN: 0
EYE DISCHARGE: 0
TREMORS: 0
COUGH: 0

## 2022-06-13 NOTE — PROGRESS NOTES
"Chief Complaint   Patient presents with   • Follow-Up     Last seen 12/13/21         HPI: This patient is a 51 y.o. male whom is followed in our clinic for asthma and MAGDALENA last seen by me on 12/13/21. The pt is a life-long non-smoker.  He recently retired from work as a . Pulmonary function testing from October showed normal airflows with normal lung volumes and elevated DLCO.  The patient remains on Qvar 80 and short acting bronchodilators as needed.  He has not required treatment for acute exacerbation with prednisone in over a year although has needed his rescue inhaler 3-4 times weekly and occasionally at night this past spring due to allergies. He does not always take his qvar regularly however. Review of compliance data 5/14-6/12/22 shows 29/30 days usage for 97% compliance for average 5 hours and 16 min per night with tx AHI of 0 on C-flex pressure of 13 cmH2O flex setting of 2. Other than mild increase in ADI usage, he is doing well. He is due for pCV 15 or PCV 20 neither of which we have available today.     Past Medical History:   Diagnosis Date   • Allergy    • Arrhythmia    • Asthma    • Chest pain    • Close exposure to COVID-19 virus 8/6/2020   • Diabetes (HCC)    • Dyspnea on exertion 3/24/2020   • Heart murmur    • Hyperlipidemia    • Hypertension    • Overweight    • Rheumatic fever with cardiac involvement     patient cant recall but thinks there was a \"tear\" in his heart   • Sprain of medial collateral ligament of left knee 6/5/2019       Social History     Socioeconomic History   • Marital status:      Spouse name: Not on file   • Number of children: Not on file   • Years of education: Not on file   • Highest education level: Not on file   Occupational History   • Occupation: Retired   Tobacco Use   • Smoking status: Never Smoker   • Smokeless tobacco: Never Used   Vaping Use   • Vaping Use: Never used   Substance and Sexual Activity   • Alcohol use: No   • Drug use: No "   • Sexual activity: Yes     Partners: Female   Other Topics Concern   • Not on file   Social History Narrative   • Not on file     Social Determinants of Health     Financial Resource Strain: Not on file   Food Insecurity: Not on file   Transportation Needs: Not on file   Physical Activity: Not on file   Stress: Not on file   Social Connections: Not on file   Intimate Partner Violence: Not on file   Housing Stability: Not on file       Family History   Problem Relation Age of Onset   • Cancer Father    • Heart Failure Mother    • Heart Disease Mother    • Hypertension Mother    • No Known Problems Child        Current Outpatient Medications on File Prior to Visit   Medication Sig Dispense Refill   • atorvastatin (LIPITOR) 40 MG Tab Take 1 Tablet by mouth every evening. 90 Tablet 3   • metFORMIN ER (GLUCOPHAGE XR) 750 MG TABLET SR 24 HR Take 1 Tablet by mouth every day. 90 Tablet 3   • lisinopril-hydrochlorothiazide (PRINZIDE) 20-25 MG per tablet Take 2 Tablets by mouth every day. 180 Tablet 3   • PROAIR  (90 Base) MCG/ACT Aero Soln inhalation aerosol Inhale 1-2 Puffs every four hours as needed for Shortness of Breath. 8.5 g 11   • QVAR REDIHALER 80 MCG/ACT inhaler Inhale 1 Puff 2 times a day. 1 Each 11   • aspirin 81 MG EC tablet Take 81 mg by mouth every day at 6 PM.       No current facility-administered medications on file prior to visit.       Pcn [penicillins]      ROS:   Review of Systems   Constitutional: Negative for chills, diaphoresis, fever, malaise/fatigue and weight loss.   HENT: Negative for congestion, ear discharge, ear pain, hearing loss, nosebleeds, sinus pain, sore throat and tinnitus.    Eyes: Negative for blurred vision, double vision, photophobia, pain, discharge and redness.   Respiratory: Negative for cough, hemoptysis, sputum production, shortness of breath, wheezing and stridor.    Cardiovascular: Negative for chest pain, palpitations, orthopnea, claudication, leg swelling and PND.  "  Gastrointestinal: Negative for abdominal pain, constipation, diarrhea, heartburn, nausea and vomiting.   Genitourinary: Negative for dysuria and urgency.   Musculoskeletal: Negative for back pain, falls, joint pain, myalgias and neck pain.   Skin: Negative for itching and rash.   Neurological: Negative for dizziness, tremors, speech change, focal weakness, weakness and headaches.   Endo/Heme/Allergies: Negative for environmental allergies.   Psychiatric/Behavioral: Negative for depression.       /62 (BP Location: Right arm, Patient Position: Sitting, BP Cuff Size: Large adult)   Pulse 79   Resp 16   Ht 1.702 m (5' 7\")   Wt 102 kg (225 lb)   SpO2 97%   Physical Exam  Vitals reviewed.   Constitutional:       General: He is not in acute distress.     Appearance: Normal appearance. He is obese.   HENT:      Head: Normocephalic and atraumatic.      Right Ear: External ear normal.      Left Ear: External ear normal.      Nose: Nose normal. No congestion.      Mouth/Throat:      Mouth: Mucous membranes are moist.      Pharynx: Oropharynx is clear. No oropharyngeal exudate.   Eyes:      General: No scleral icterus.     Extraocular Movements: Extraocular movements intact.      Conjunctiva/sclera: Conjunctivae normal.      Pupils: Pupils are equal, round, and reactive to light.   Cardiovascular:      Rate and Rhythm: Normal rate and regular rhythm.      Heart sounds: Normal heart sounds. No murmur heard.    No gallop.   Pulmonary:      Effort: Pulmonary effort is normal. No respiratory distress.      Breath sounds: Normal breath sounds. No wheezing or rales.   Abdominal:      General: There is no distension.      Palpations: Abdomen is soft.   Musculoskeletal:         General: Normal range of motion.      Cervical back: Normal range of motion and neck supple.      Right lower leg: No edema.      Left lower leg: No edema.   Skin:     General: Skin is warm and dry.      Findings: No rash.   Neurological:      " Mental Status: He is alert and oriented to person, place, and time.      Cranial Nerves: No cranial nerve deficit.   Psychiatric:         Mood and Affect: Mood normal.         Behavior: Behavior normal.         PFTs as reviewed by me personally: as per HPI      Assessment:  1. Mild intermittent asthma without complication  Spirometry   2. MAGDALENA (obstructive sleep apnea)         Plan:  1. Chronic and control decreased recently. I okay'd prn use of ICS based on most recent GLADIS guidelines including additional puffs of ICS from prescription. Continue prn ADI. F/u one year with spirometry or sooner.  2. Excellent compliance and clinical efficacy; continue CPAP; f/u one year.   Return in about 6 months (around 12/13/2022) for asthma, MAGDALENA.

## 2022-07-22 ENCOUNTER — HOSPITAL ENCOUNTER (OUTPATIENT)
Facility: MEDICAL CENTER | Age: 52
End: 2022-07-22
Attending: PHYSICIAN ASSISTANT
Payer: COMMERCIAL

## 2022-07-22 ENCOUNTER — NON-PROVIDER VISIT (OUTPATIENT)
Dept: MEDICAL GROUP | Facility: CLINIC | Age: 52
End: 2022-07-22
Payer: COMMERCIAL

## 2022-07-22 DIAGNOSIS — E11.8 TYPE 2 DIABETES MELLITUS WITH COMPLICATION, WITHOUT LONG-TERM CURRENT USE OF INSULIN (HCC): ICD-10-CM

## 2022-07-22 DIAGNOSIS — E78.2 MIXED HYPERLIPIDEMIA: ICD-10-CM

## 2022-07-22 DIAGNOSIS — I10 ESSENTIAL HYPERTENSION: ICD-10-CM

## 2022-07-22 PROCEDURE — 36415 COLL VENOUS BLD VENIPUNCTURE: CPT | Performed by: PHYSICIAN ASSISTANT

## 2022-07-22 PROCEDURE — 80053 COMPREHEN METABOLIC PANEL: CPT

## 2022-07-22 PROCEDURE — 80061 LIPID PANEL: CPT

## 2022-07-22 PROCEDURE — 83036 HEMOGLOBIN GLYCOSYLATED A1C: CPT

## 2022-07-23 DIAGNOSIS — E11.8 TYPE 2 DIABETES MELLITUS WITH COMPLICATION, WITHOUT LONG-TERM CURRENT USE OF INSULIN (HCC): ICD-10-CM

## 2022-07-23 DIAGNOSIS — E78.2 MIXED HYPERLIPIDEMIA: ICD-10-CM

## 2022-07-23 DIAGNOSIS — I10 ESSENTIAL HYPERTENSION: ICD-10-CM

## 2022-07-23 LAB
ALBUMIN SERPL BCP-MCNC: 4.7 G/DL (ref 3.2–4.9)
ALBUMIN/GLOB SERPL: 1.6 G/DL
ALP SERPL-CCNC: 61 U/L (ref 30–99)
ALT SERPL-CCNC: 50 U/L (ref 2–50)
ANION GAP SERPL CALC-SCNC: 15 MMOL/L (ref 7–16)
AST SERPL-CCNC: 58 U/L (ref 12–45)
BILIRUB SERPL-MCNC: 1.3 MG/DL (ref 0.1–1.5)
BUN SERPL-MCNC: 15 MG/DL (ref 8–22)
CALCIUM SERPL-MCNC: 10 MG/DL (ref 8.5–10.5)
CHLORIDE SERPL-SCNC: 97 MMOL/L (ref 96–112)
CHOLEST SERPL-MCNC: 193 MG/DL (ref 100–199)
CO2 SERPL-SCNC: 22 MMOL/L (ref 20–33)
CREAT SERPL-MCNC: 0.84 MG/DL (ref 0.5–1.4)
EST. AVERAGE GLUCOSE BLD GHB EST-MCNC: 103 MG/DL
GFR SERPLBLD CREATININE-BSD FMLA CKD-EPI: 104 ML/MIN/1.73 M 2
GLOBULIN SER CALC-MCNC: 2.9 G/DL (ref 1.9–3.5)
GLUCOSE SERPL-MCNC: 106 MG/DL (ref 65–99)
HBA1C MFR BLD: 5.2 % (ref 4–5.6)
HDLC SERPL-MCNC: 70 MG/DL
LDLC SERPL CALC-MCNC: 93 MG/DL
POTASSIUM SERPL-SCNC: 4.2 MMOL/L (ref 3.6–5.5)
PROT SERPL-MCNC: 7.6 G/DL (ref 6–8.2)
SODIUM SERPL-SCNC: 134 MMOL/L (ref 135–145)
TRIGL SERPL-MCNC: 148 MG/DL (ref 0–149)

## 2022-07-27 ENCOUNTER — OFFICE VISIT (OUTPATIENT)
Dept: MEDICAL GROUP | Facility: CLINIC | Age: 52
End: 2022-07-27
Payer: COMMERCIAL

## 2022-07-27 VITALS
DIASTOLIC BLOOD PRESSURE: 82 MMHG | RESPIRATION RATE: 16 BRPM | HEIGHT: 67 IN | BODY MASS INDEX: 34.65 KG/M2 | SYSTOLIC BLOOD PRESSURE: 124 MMHG | OXYGEN SATURATION: 95 % | TEMPERATURE: 98.9 F | WEIGHT: 220.8 LBS | HEART RATE: 50 BPM

## 2022-07-27 DIAGNOSIS — E66.09 CLASS 1 OBESITY DUE TO EXCESS CALORIES WITH SERIOUS COMORBIDITY AND BODY MASS INDEX (BMI) OF 34.0 TO 34.9 IN ADULT: ICD-10-CM

## 2022-07-27 DIAGNOSIS — E11.8 TYPE 2 DIABETES MELLITUS WITH COMPLICATION, WITHOUT LONG-TERM CURRENT USE OF INSULIN (HCC): ICD-10-CM

## 2022-07-27 DIAGNOSIS — E78.2 MIXED HYPERLIPIDEMIA: ICD-10-CM

## 2022-07-27 PROBLEM — E66.811 CLASS 1 OBESITY DUE TO EXCESS CALORIES WITH SERIOUS COMORBIDITY AND BODY MASS INDEX (BMI) OF 34.0 TO 34.9 IN ADULT: Status: ACTIVE | Noted: 2018-06-05

## 2022-07-27 PROCEDURE — 99214 OFFICE O/P EST MOD 30 MIN: CPT | Performed by: PHYSICIAN ASSISTANT

## 2022-07-27 PROCEDURE — 92250 FUNDUS PHOTOGRAPHY W/I&R: CPT | Mod: TC | Performed by: PHYSICIAN ASSISTANT

## 2022-07-27 ASSESSMENT — FIBROSIS 4 INDEX: FIB4 SCORE: 2.31

## 2022-07-27 ASSESSMENT — PATIENT HEALTH QUESTIONNAIRE - PHQ9: CLINICAL INTERPRETATION OF PHQ2 SCORE: 0

## 2022-07-27 NOTE — ASSESSMENT & PLAN NOTE
Currently treated for DM, taking meds and checking blood sugars at home, trying to do DM diet. Current A1c is 5.2%. He will continue taking medications as prescribed.  Controlled

## 2022-07-27 NOTE — ASSESSMENT & PLAN NOTE
Chronic condition well controlled on atorvastatin 40 mg. Denies chest pain, shortness of breath or muscle pain. Labs have been checked in past year and are stable on current dose. Will continue prescribed medications as directed.  Much improved from 7 months ago.  Controlled

## 2022-07-27 NOTE — PROGRESS NOTES
Chief Complaint   Patient presents with   • Lab Results     Following up on labs       HISTORY OF PRESENT ILLNESS: Patient is a 51 y.o. male established patient who presents today to discuss the following issues:    Type 2 diabetes mellitus with complication, without long-term current use of insulin ([facility])  Currently treated for DM, taking meds and checking blood sugars at home, trying to do DM diet. Current A1c is 5.2%. He will continue taking medications as prescribed.  Controlled    Mixed hyperlipidemia  Chronic condition well controlled on atorvastatin 40 mg. Denies chest pain, shortness of breath or muscle pain. Labs have been checked in past year and are stable on current dose. Will continue prescribed medications as directed.  Much improved from 7 months ago.  Controlled      Patient Active Problem List    Diagnosis Date Noted   • Trigger little finger of right hand 12/27/2021   • PVC's (premature ventricular contractions) 03/24/2020   • Chronic left shoulder pain 01/27/2020   • Mixed hyperlipidemia 01/27/2020   • Type 2 diabetes mellitus with complication, without long-term current use of insulin ([facility]) 11/21/2018   • Class 1 obesity due to excess calories with serious comorbidity and body mass index (BMI) of 34.0 to 34.9 in adult 06/05/2018   • Elevated cortisol level 06/05/2018   • Mild intermittent asthma without complication 10/06/2016   • MAGDALENA (obstructive sleep apnea) 10/06/2016   • Essential hypertension 10/06/2016       Allergies:Pcn [penicillins]    Current Outpatient Medications   Medication Sig Dispense Refill   • QVAR REDIHALER 80 MCG/ACT inhaler Inhale 1 Puff 2 times a day. 3 Each 3   • PROAIR  (90 Base) MCG/ACT Aero Soln inhalation aerosol Inhale 1-2 Puffs every four hours as needed for Shortness of Breath. 8.5 g 11   • atorvastatin (LIPITOR) 40 MG Tab Take 1 Tablet by mouth every evening. 90 Tablet 3   • metFORMIN ER (GLUCOPHAGE XR) 750 MG TABLET SR 24 HR Take 1 Tablet by mouth every day.  90 Tablet 3   • lisinopril-hydrochlorothiazide (PRINZIDE) 20-25 MG per tablet Take 2 Tablets by mouth every day. 180 Tablet 3   • aspirin 81 MG EC tablet Take 81 mg by mouth every day at 6 PM.       No current facility-administered medications for this visit.       Social History     Tobacco Use   • Smoking status: Never Smoker   • Smokeless tobacco: Never Used   Vaping Use   • Vaping Use: Never used   Substance Use Topics   • Alcohol use: No   • Drug use: No       Family Status   Relation Name Status   • Fa     • Mo     • Bro  Other   • Bro  Other   • Child  Alive     Family History   Problem Relation Age of Onset   • Cancer Father    • Heart Failure Mother    • Heart Disease Mother    • Hypertension Mother    • No Known Problems Child        Review of Systems:   Constitutional: Negative for fever, chills, weight loss and malaise/fatigue.   HENT: Negative for ear pain, nosebleeds, congestion, sore throat and neck pain.    Eyes: Negative for blurred vision.   Respiratory: Negative for cough, sputum production, shortness of breath and wheezing.    Cardiovascular: Negative for chest pain, palpitations, orthopnea and leg swelling.   Gastrointestinal: Negative for heartburn, nausea, vomiting and abdominal pain.   Genitourinary: Negative for dysuria, urgency and frequency.   Musculoskeletal: Negative for myalgias, back pain and joint pain.   Skin: Negative for rash and itching.   Neurological: Negative for dizziness, tingling, tremors, sensory change, focal weakness and headaches.   Endo/Heme/Allergies: Does not bruise/bleed easily.   Psychiatric/Behavioral: Negative for depression, suicidal ideas and memory loss.  The patient is not nervous/anxious and does not have insomnia.    All other systems reviewed and are negative except as in HPI.    Wt Readings from Last 3 Encounters:   22 100 kg (220 lb 12.8 oz)   22 102 kg (225 lb)   22 103 kg (227 lb)   ]    Exam:  /82 (BP Location:  "Left arm, Patient Position: Sitting, BP Cuff Size: Large adult)   Pulse (!) 50   Temp 37.2 °C (98.9 °F) (Temporal)   Resp 16   Ht 1.702 m (5' 7\")   Wt 100 kg (220 lb 12.8 oz)   SpO2 95%  Body mass index is 34.58 kg/m².   General:  Well nourished, well developed male. No apparent distress. Not ill appearing.  Eyes: EOM intact, PERRL, conjunctiva non-injected, sclera non-icteric.  Neck: Supple with no cervical lymphadenopathy, JVD, palpable thyroid nodules or carotid bruits.  Pulmonary: Clear to ausculation bilaterally. Normal effort. No rales, ronchi, or wheezing.  Cardiovascular: Regular rate and rhythm without murmur, rub or gallop.   Extremities: Full range of motion. Warm and well perfused with no edema.  Skin: Intact with no obvious rashes or lesions.  Neuro: Cranial nerves I-XII grossly intact.  Psych: Alert and oriented x 3.  Appropriately dressed. Mood and affect appropriate.    Assessment/Plan:  1. Type 2 diabetes mellitus with complication, without long-term current use of insulin (HCC)  POCT Retinal Eye Exam   2. Class 1 obesity due to excess calories with serious comorbidity and body mass index (BMI) of 34.0 to 34.9 in adult  Patient identified as having weight management issue.  Appropriate orders and counseling given.   3. Mixed hyperlipidemia         Reviewed risks and benefits of treatment plan. Patient verbally agrees to plan of care.     Return in about 5 months (around 12/13/2022) for f/u labs.    Please note that this dictation was created using voice recognition software. I have made every reasonable attempt to correct obvious errors, but I expect that there are errors of grammar and possibly content that I did not discover before finalizing the note.  "

## 2022-08-01 LAB — RETINAL SCREEN: NORMAL

## 2022-12-27 ENCOUNTER — OFFICE VISIT (OUTPATIENT)
Dept: MEDICAL GROUP | Facility: CLINIC | Age: 52
End: 2022-12-27
Payer: COMMERCIAL

## 2022-12-27 VITALS
WEIGHT: 228.2 LBS | OXYGEN SATURATION: 96 % | SYSTOLIC BLOOD PRESSURE: 122 MMHG | DIASTOLIC BLOOD PRESSURE: 78 MMHG | HEART RATE: 50 BPM | HEIGHT: 67 IN | BODY MASS INDEX: 35.82 KG/M2 | RESPIRATION RATE: 18 BRPM | TEMPERATURE: 99.2 F

## 2022-12-27 DIAGNOSIS — H66.91 OTITIS MEDIA FOLLOW-UP, NOT RESOLVED, RIGHT: ICD-10-CM

## 2022-12-27 PROCEDURE — 99214 OFFICE O/P EST MOD 30 MIN: CPT | Performed by: PHYSICIAN ASSISTANT

## 2022-12-27 RX ORDER — AZITHROMYCIN 250 MG/1
TABLET, FILM COATED ORAL
COMMUNITY
Start: 2022-12-12 | End: 2023-01-10

## 2022-12-27 RX ORDER — ATORVASTATIN CALCIUM 10 MG/1
10 TABLET, FILM COATED ORAL DAILY
COMMUNITY
End: 2022-12-27

## 2022-12-27 RX ORDER — BENZONATATE 100 MG/1
CAPSULE ORAL
COMMUNITY
Start: 2022-12-12 | End: 2023-01-10

## 2022-12-27 RX ORDER — CLINDAMYCIN HYDROCHLORIDE 300 MG/1
CAPSULE ORAL
COMMUNITY
Start: 2022-12-17 | End: 2022-12-27 | Stop reason: SDUPTHER

## 2022-12-27 RX ORDER — CLINDAMYCIN HYDROCHLORIDE 300 MG/1
300 CAPSULE ORAL 3 TIMES DAILY
Qty: 42 CAPSULE | Refills: 0 | Status: SHIPPED | OUTPATIENT
Start: 2022-12-27 | End: 2023-01-10

## 2022-12-27 RX ORDER — PREDNISONE 20 MG/1
TABLET ORAL
COMMUNITY
Start: 2022-12-12 | End: 2023-01-10

## 2022-12-27 RX ORDER — OFLOXACIN 3 MG/ML
SOLUTION AURICULAR (OTIC)
COMMUNITY
Start: 2022-12-17 | End: 2022-12-27

## 2022-12-27 RX ORDER — AZITHROMYCIN 250 MG/1
TABLET, FILM COATED ORAL
COMMUNITY
Start: 2022-12-12 | End: 2022-12-27

## 2022-12-27 NOTE — PROGRESS NOTES
Chief Complaint   Patient presents with    Ear Fullness     Pt c/o L ear infection, been on antibiotics a couple weeks. Pt has sinus infection, feeling better.       HISTORY OF PRESENT ILLNESS: Patient is a 52 y.o. male established patient who presents today to discuss the following issues:    Assessment/Plan  Otitis media follow-up, not resolved, right  Patient presents with continuing ear fullness. States he was seen in  for sinus and ear infection and given an antibiotic. He states that he started to feel better for a few days then all the symptoms returned. On exam right TM is pearly grey with good light reflex. On the left TM is bulging, dull, opaque with marked erythema. Will start him on a 14 day course of clindamycin three times a day. He is to return if symptoms do not resolve. He is to go to UC or ER if symptoms worsen.      Reviewed risks and benefits of treatment plan. Patient verbally agrees to plan of care.     Patient Active Problem List    Diagnosis Date Noted    Otitis media follow-up, not resolved, right 12/30/2022    Trigger little finger of right hand 12/27/2021    PVC's (premature ventricular contractions) 03/24/2020    Chronic left shoulder pain 01/27/2020    Mixed hyperlipidemia 01/27/2020    Type 2 diabetes mellitus with complication, without long-term current use of insulin ([facility]) 11/21/2018    Class 1 obesity due to excess calories with serious comorbidity and body mass index (BMI) of 34.0 to 34.9 in adult 06/05/2018    Elevated cortisol level 06/05/2018    Mild intermittent asthma without complication 10/06/2016    MAGDALENA (obstructive sleep apnea) 10/06/2016    Essential hypertension 10/06/2016       Allergies:Pcn [penicillins]    Current Outpatient Medications   Medication Sig Dispense Refill    predniSONE (DELTASONE) 20 MG Tab       benzonatate (TESSALON) 100 MG Cap       azithromycin (ZITHROMAX) 250 MG Tab Take 2 tablets by mouth for 1 day, then 1 tablet by mouth for 4 days.       "clindamycin (CLEOCIN) 300 MG Cap Take 1 Capsule by mouth 3 times a day. 42 Capsule 0    QVAR REDIHALER 80 MCG/ACT inhaler Inhale 1 Puff 2 times a day. 3 Each 3    PROAIR  (90 Base) MCG/ACT Aero Soln inhalation aerosol Inhale 1-2 Puffs every four hours as needed for Shortness of Breath. 8.5 g 11    atorvastatin (LIPITOR) 40 MG Tab Take 1 Tablet by mouth every evening. 90 Tablet 3    metFORMIN ER (GLUCOPHAGE XR) 750 MG TABLET SR 24 HR Take 1 Tablet by mouth every day. 90 Tablet 3    lisinopril-hydrochlorothiazide (PRINZIDE) 20-25 MG per tablet Take 2 Tablets by mouth every day. 180 Tablet 3    aspirin 81 MG EC tablet Take 81 mg by mouth every day at 6 PM.       No current facility-administered medications for this visit.       Wt Readings from Last 3 Encounters:   12/27/22 104 kg (228 lb 3.2 oz)   07/27/22 100 kg (220 lb 12.8 oz)   06/13/22 102 kg (225 lb)   ]    Exam:  /78 (BP Location: Right arm, Patient Position: Sitting, BP Cuff Size: Adult)   Pulse (!) 50   Temp 37.3 °C (99.2 °F) (Temporal)   Resp 18   Ht 1.702 m (5' 7\")   Wt 104 kg (228 lb 3.2 oz)   SpO2 96%  Body mass index is 35.74 kg/m².   General:  Well nourished, well developed male. No apparent distress. Not ill appearing.  Eyes: EOM intact, PERRL, conjunctiva non-injected, sclera non-icteric.  Ears: Sandee pinnae, external auditory canals, TM pearly gray with normal light reflex on the right. TM is bulging, dull, opaque with marked erythema on the left.  Neck: Supple with no cervical lymphadenopathy, JVD, palpable thyroid nodules or carotid bruits.  Pulmonary: Clear to ausculation bilaterally. Normal effort. No rales, ronchi, or wheezing.  Cardiovascular: Regular rate and rhythm without murmur, rub or gallop.   Extremities: Full range of motion. Warm and well perfused with no edema.  Skin: Intact with no obvious rashes or lesions.  Neuro: Cranial nerves I-XII grossly intact.  Psych: Alert and oriented x 3.  Appropriately dressed. Mood " and affect appropriate.    Return in about 2 weeks (around 1/10/2023) for f/u ear infection.  Please note that this dictation was created using voice recognition software. I have made every reasonable attempt to correct obvious errors, but I expect that there are errors of grammar and possibly content that I did not discover before finalizing the note.

## 2022-12-28 ENCOUNTER — NON-PROVIDER VISIT (OUTPATIENT)
Dept: SLEEP MEDICINE | Facility: MEDICAL CENTER | Age: 52
End: 2022-12-28
Attending: INTERNAL MEDICINE
Payer: COMMERCIAL

## 2022-12-28 DIAGNOSIS — J45.20 MILD INTERMITTENT ASTHMA WITHOUT COMPLICATION: ICD-10-CM

## 2022-12-28 PROCEDURE — 94060 EVALUATION OF WHEEZING: CPT | Performed by: INTERNAL MEDICINE

## 2022-12-28 ASSESSMENT — PULMONARY FUNCTION TESTS
FEV1/FVC_PREDICTED: 78.9
FVC: 3.36
FEV1/FVC_PERCENT_CHANGE: 0
FVC_PERCENT_PREDICTED: 76
FVC_PERCENT_PREDICTED: 77
FEV1_PREDICTED: 78
FEV1_PREDICTED: 3.44
FEV1/FVC_PERCENT_PREDICTED: 101
FVC_PREDICTED: 4.36
FEV1: 2.7
FVC: 3.31
FEV1: 2.68
FEV1/FVC_PERCENT_PREDICTED: 101
FEV1_PERCENT_PREDICTED: 77
FEV1_PERCENT_CHANGE: -1
FEV1/FVC: 80
FEV1/FVC: 80.97
FEV1_PERCENT_CHANGE: 0

## 2022-12-28 NOTE — PROCEDURES
Good patient effort & cooperation. Pt experienced brief Vaso-Vagel episode on each effort with good recovery.  The results of this test meet the ATS standards for acceptability and repeatability. Test was performed on the PollenD spirometry system. Predicted values were GLI-2012. A bronchodilator of Ventolin HFA- 2 puffs with a spacer was administered to the patient.    There is no significant obstructive ventilatory defect on spirometry.  There is no significant bronchodilator response.    Compared to prior testing in 2021, FEV1 has declined slightly from 2.91 L to 2.70 L, or 78% predicted.    Flow volume loop is consistent with the above interpretation.      IAugusto M.D. am the author of this note (PFT interpretation).    __________  Augusto Marcos MD  Pulmonary and [facility]

## 2022-12-30 PROBLEM — H66.91: Status: ACTIVE | Noted: 2022-12-30

## 2022-12-31 NOTE — ASSESSMENT & PLAN NOTE
Patient presents with continuing ear fullness. States he was seen in  for sinus and ear infection and given an antibiotic. He states that he started to feel better for a few days then all the symptoms returned. On exam right TM is pearly grey with good light reflex. On the left TM is bulging, dull, opaque with marked erythema. Will start him on a 14 day course of clindamycin three times a day. He is to return if symptoms do not resolve. He is to go to UC or ER if symptoms worsen.

## 2023-01-06 ENCOUNTER — HOSPITAL ENCOUNTER (OUTPATIENT)
Facility: MEDICAL CENTER | Age: 53
End: 2023-01-06
Attending: PHYSICIAN ASSISTANT
Payer: COMMERCIAL

## 2023-01-06 ENCOUNTER — NON-PROVIDER VISIT (OUTPATIENT)
Dept: MEDICAL GROUP | Facility: CLINIC | Age: 53
End: 2023-01-06
Payer: COMMERCIAL

## 2023-01-06 DIAGNOSIS — E55.9 VITAMIN D DEFICIENCY: ICD-10-CM

## 2023-01-06 DIAGNOSIS — R79.89 ELEVATED LFTS: ICD-10-CM

## 2023-01-06 DIAGNOSIS — E11.8 TYPE 2 DIABETES MELLITUS WITH COMPLICATION, WITHOUT LONG-TERM CURRENT USE OF INSULIN (HCC): ICD-10-CM

## 2023-01-06 DIAGNOSIS — Z11.4 SCREENING FOR HIV WITHOUT PRESENCE OF RISK FACTORS: ICD-10-CM

## 2023-01-06 DIAGNOSIS — R79.89 ABNORMAL CBC: ICD-10-CM

## 2023-01-06 DIAGNOSIS — I10 ESSENTIAL HYPERTENSION: ICD-10-CM

## 2023-01-06 DIAGNOSIS — E78.2 MIXED HYPERLIPIDEMIA: ICD-10-CM

## 2023-01-06 DIAGNOSIS — Z11.59 ENCOUNTER FOR HEPATITIS C SCREENING TEST FOR LOW RISK PATIENT: ICD-10-CM

## 2023-01-06 DIAGNOSIS — Z12.5 SCREENING FOR MALIGNANT NEOPLASM OF PROSTATE: ICD-10-CM

## 2023-01-06 DIAGNOSIS — Z12.11 SCREENING FOR MALIGNANT NEOPLASM OF COLON: ICD-10-CM

## 2023-01-06 PROCEDURE — 84153 ASSAY OF PSA TOTAL: CPT

## 2023-01-06 PROCEDURE — 87389 HIV-1 AG W/HIV-1&-2 AB AG IA: CPT

## 2023-01-06 PROCEDURE — 82043 UR ALBUMIN QUANTITATIVE: CPT

## 2023-01-06 PROCEDURE — 82306 VITAMIN D 25 HYDROXY: CPT

## 2023-01-06 PROCEDURE — 80061 LIPID PANEL: CPT

## 2023-01-06 PROCEDURE — 80053 COMPREHEN METABOLIC PANEL: CPT

## 2023-01-06 PROCEDURE — 86803 HEPATITIS C AB TEST: CPT

## 2023-01-06 PROCEDURE — 82570 ASSAY OF URINE CREATININE: CPT

## 2023-01-06 PROCEDURE — 85025 COMPLETE CBC W/AUTO DIFF WBC: CPT

## 2023-01-06 PROCEDURE — 36415 COLL VENOUS BLD VENIPUNCTURE: CPT | Performed by: PHYSICIAN ASSISTANT

## 2023-01-06 PROCEDURE — 83036 HEMOGLOBIN GLYCOSYLATED A1C: CPT

## 2023-01-06 NOTE — PROGRESS NOTES
Here today to gets labs done before his upcoming appointment. Routine annual and screening labs ordered.

## 2023-01-07 LAB
25(OH)D3 SERPL-MCNC: 24 NG/ML (ref 30–100)
ALBUMIN SERPL BCP-MCNC: 4.4 G/DL (ref 3.2–4.9)
ALBUMIN/GLOB SERPL: 1.8 G/DL
ALP SERPL-CCNC: 58 U/L (ref 30–99)
ALT SERPL-CCNC: 60 U/L (ref 2–50)
ANION GAP SERPL CALC-SCNC: 14 MMOL/L (ref 7–16)
AST SERPL-CCNC: 57 U/L (ref 12–45)
BASOPHILS # BLD AUTO: 1.2 % (ref 0–1.8)
BASOPHILS # BLD: 0.07 K/UL (ref 0–0.12)
BILIRUB SERPL-MCNC: 1.1 MG/DL (ref 0.1–1.5)
BUN SERPL-MCNC: 11 MG/DL (ref 8–22)
CALCIUM ALBUM COR SERPL-MCNC: 9.1 MG/DL (ref 8.5–10.5)
CALCIUM SERPL-MCNC: 9.4 MG/DL (ref 8.5–10.5)
CHLORIDE SERPL-SCNC: 97 MMOL/L (ref 96–112)
CHOLEST SERPL-MCNC: 190 MG/DL (ref 100–199)
CO2 SERPL-SCNC: 23 MMOL/L (ref 20–33)
CREAT SERPL-MCNC: 0.71 MG/DL (ref 0.5–1.4)
CREAT UR-MCNC: 143.2 MG/DL
EOSINOPHIL # BLD AUTO: 0.08 K/UL (ref 0–0.51)
EOSINOPHIL NFR BLD: 1.4 % (ref 0–6.9)
ERYTHROCYTE [DISTWIDTH] IN BLOOD BY AUTOMATED COUNT: 47.8 FL (ref 35.9–50)
EST. AVERAGE GLUCOSE BLD GHB EST-MCNC: 108 MG/DL
GFR SERPLBLD CREATININE-BSD FMLA CKD-EPI: 109 ML/MIN/1.73 M 2
GLOBULIN SER CALC-MCNC: 2.5 G/DL (ref 1.9–3.5)
GLUCOSE SERPL-MCNC: 103 MG/DL (ref 65–99)
HBA1C MFR BLD: 5.4 % (ref 4–5.6)
HCT VFR BLD AUTO: 46.9 % (ref 42–52)
HCV AB SER QL: NORMAL
HDLC SERPL-MCNC: 74 MG/DL
HGB BLD-MCNC: 16.1 G/DL (ref 14–18)
HIV 1+2 AB+HIV1 P24 AG SERPL QL IA: NORMAL
IMM GRANULOCYTES # BLD AUTO: 0.02 K/UL (ref 0–0.11)
IMM GRANULOCYTES NFR BLD AUTO: 0.3 % (ref 0–0.9)
LDLC SERPL CALC-MCNC: 87 MG/DL
LYMPHOCYTES # BLD AUTO: 1.49 K/UL (ref 1–4.8)
LYMPHOCYTES NFR BLD: 25.9 % (ref 22–41)
MCH RBC QN AUTO: 34.5 PG (ref 27–33)
MCHC RBC AUTO-ENTMCNC: 34.3 G/DL (ref 33.7–35.3)
MCV RBC AUTO: 100.6 FL (ref 81.4–97.8)
MICROALBUMIN UR-MCNC: <1.2 MG/DL
MICROALBUMIN/CREAT UR: NORMAL MG/G (ref 0–30)
MONOCYTES # BLD AUTO: 0.66 K/UL (ref 0–0.85)
MONOCYTES NFR BLD AUTO: 11.5 % (ref 0–13.4)
NEUTROPHILS # BLD AUTO: 3.43 K/UL (ref 1.82–7.42)
NEUTROPHILS NFR BLD: 59.7 % (ref 44–72)
NRBC # BLD AUTO: 0 K/UL
NRBC BLD-RTO: 0 /100 WBC
PLATELET # BLD AUTO: 173 K/UL (ref 164–446)
PMV BLD AUTO: 9.6 FL (ref 9–12.9)
POTASSIUM SERPL-SCNC: 3.8 MMOL/L (ref 3.6–5.5)
PROT SERPL-MCNC: 6.9 G/DL (ref 6–8.2)
PSA SERPL-MCNC: 1.26 NG/ML (ref 0–4)
RBC # BLD AUTO: 4.66 M/UL (ref 4.7–6.1)
SODIUM SERPL-SCNC: 134 MMOL/L (ref 135–145)
TRIGL SERPL-MCNC: 145 MG/DL (ref 0–149)
WBC # BLD AUTO: 5.8 K/UL (ref 4.8–10.8)

## 2023-01-10 ENCOUNTER — PATIENT MESSAGE (OUTPATIENT)
Dept: CARDIOLOGY | Facility: MEDICAL CENTER | Age: 53
End: 2023-01-10

## 2023-01-10 ENCOUNTER — OFFICE VISIT (OUTPATIENT)
Dept: MEDICAL GROUP | Facility: CLINIC | Age: 53
End: 2023-01-10
Payer: COMMERCIAL

## 2023-01-10 VITALS
TEMPERATURE: 98.6 F | OXYGEN SATURATION: 93 % | SYSTOLIC BLOOD PRESSURE: 124 MMHG | HEART RATE: 76 BPM | BODY MASS INDEX: 36.07 KG/M2 | HEIGHT: 67 IN | DIASTOLIC BLOOD PRESSURE: 70 MMHG | WEIGHT: 229.8 LBS | RESPIRATION RATE: 20 BRPM

## 2023-01-10 DIAGNOSIS — R79.89 ELEVATED LFTS: ICD-10-CM

## 2023-01-10 DIAGNOSIS — E11.8 TYPE 2 DIABETES MELLITUS WITH COMPLICATION, WITHOUT LONG-TERM CURRENT USE OF INSULIN (HCC): ICD-10-CM

## 2023-01-10 DIAGNOSIS — I49.3 PVC'S (PREMATURE VENTRICULAR CONTRACTIONS): ICD-10-CM

## 2023-01-10 DIAGNOSIS — E66.9 OBESITY (BMI 30-39.9): ICD-10-CM

## 2023-01-10 DIAGNOSIS — E78.2 MIXED HYPERLIPIDEMIA: ICD-10-CM

## 2023-01-10 PROCEDURE — 93000 ELECTROCARDIOGRAM COMPLETE: CPT | Performed by: PHYSICIAN ASSISTANT

## 2023-01-10 PROCEDURE — 99214 OFFICE O/P EST MOD 30 MIN: CPT | Performed by: PHYSICIAN ASSISTANT

## 2023-01-10 ASSESSMENT — FIBROSIS 4 INDEX: FIB4 SCORE: 2.3

## 2023-01-10 ASSESSMENT — PATIENT HEALTH QUESTIONNAIRE - PHQ9: CLINICAL INTERPRETATION OF PHQ2 SCORE: 0

## 2023-01-10 NOTE — ASSESSMENT & PLAN NOTE
Patient currently in a consistent quadgeminy pattern. EKG in chart. States that he is feeling different from his normal intermittent palpitations. He has an upcoming appointment with his cardiologist and will discuss this further at that appointment.

## 2023-01-10 NOTE — PROGRESS NOTES
Chief Complaint   Patient presents with    Lab Results    Follow-Up     Ear infection, pt sts he doesn't have any pain now but he cant hear too well still he thinks the left side might have ruptured        HISTORY OF PRESENT ILLNESS: Patient is a 52 y.o. male established patient who presents today to discuss the following issues:    Assessment/Plan  Type 2 diabetes mellitus with complication, without long-term current use of insulin (HCC)  Currently treated for DM, taking metformin and checking blood sugars at home, trying to do DM diet. Current A1c is 5.4% and monofilament exam is within normal limits.  Controlled    Mixed hyperlipidemia  Chronic condition well controlled on atorvastatin 40 mg. Denies chest pain, shortness of breath or muscle pain. Labs have been checked in past year and are stable on current dose. Will continue prescribed medications as directed.    Controlled    Elevated LFTs  Chronic condition over the last 2 years. Levels are stable and slightly elevated. Will continue to monitor. Repeat labs in one year.    Obesity (BMI 30-39.9)  Body mass index is 36.54 kg/m². Patient has been diagnosed with obesity and again has been counseled on caloric and carbohydrate restriction along with increasing exercise to 30 minutes 5 or more times a week.    PVC's (premature ventricular contractions)  On exam patient was noted to have a regularly irregular heart beat. Patient currently in a consistent quadgeminy pattern. EKG in chart. States that he is feeling different from his normal intermittent palpitations. He has also had episodes of syncope or near syncope in the recent past. He has an upcoming appointment with his cardiologist and will discuss this further at that appointment. He has been instructed to go to the ER or call 911 if he starts having chest pain or worsening of the palpitations.    Monofilament testing with a 10 gram force: sensation intact: intact bilaterally  Visual Inspection: Feet without  "maceration, ulcers, fissures.  Pedal pulses: intact bilaterally    Reviewed risks and benefits of treatment plan. Patient verbally agrees to plan of care.     Patient Active Problem List    Diagnosis Date Noted    Elevated LFTs 01/13/2023    Obesity (BMI 30-39.9) 01/10/2023    Otitis media follow-up, not resolved, right 12/30/2022    Trigger little finger of right hand 12/27/2021    PVC's (premature ventricular contractions) 03/24/2020    Chronic left shoulder pain 01/27/2020    Mixed hyperlipidemia 01/27/2020    Type 2 diabetes mellitus with complication, without long-term current use of insulin (HCC) 11/21/2018    Elevated cortisol level 06/05/2018    Mild intermittent asthma without complication 10/06/2016    MAGDALENA (obstructive sleep apnea) 10/06/2016    Essential hypertension 10/06/2016       Allergies:Pcn [penicillins]    Current Outpatient Medications   Medication Sig Dispense Refill    QVAR REDIHALER 80 MCG/ACT inhaler Inhale 1 Puff 2 times a day. 3 Each 3    PROAIR  (90 Base) MCG/ACT Aero Soln inhalation aerosol Inhale 1-2 Puffs every four hours as needed for Shortness of Breath. 8.5 g 11    atorvastatin (LIPITOR) 40 MG Tab Take 1 Tablet by mouth every evening. 90 Tablet 3    metFORMIN ER (GLUCOPHAGE XR) 750 MG TABLET SR 24 HR Take 1 Tablet by mouth every day. 90 Tablet 3    lisinopril-hydrochlorothiazide (PRINZIDE) 20-25 MG per tablet Take 2 Tablets by mouth every day. 180 Tablet 3    aspirin 81 MG EC tablet Take 81 mg by mouth every day at 6 PM.       No current facility-administered medications for this visit.       Wt Readings from Last 3 Encounters:   01/11/23 108 kg (239 lb)   01/10/23 104 kg (229 lb 12.8 oz)   12/27/22 104 kg (228 lb 3.2 oz)   ]    Exam:  /70 (BP Location: Left arm, Patient Position: Sitting, BP Cuff Size: Adult)   Pulse 76   Temp 37 °C (98.6 °F) (Temporal)   Resp 20   Ht 1.689 m (5' 6.5\")   Wt 104 kg (229 lb 12.8 oz)   SpO2 93%  Body mass index is 36.54 kg/m². "   General:  Well nourished, well developed male. No apparent distress. Not ill appearing.  Eyes: EOM intact, PERRL, conjunctiva non-injected, sclera non-icteric.  Ears: Sandee pinnae, external auditory canals, TM pearly gray with normal light reflex bilaterally.  Neck: Supple with no cervical lymphadenopathy, JVD, palpable thyroid nodules or carotid bruits.  Pulmonary: Clear to ausculation bilaterally. Normal effort. No rales, ronchi, or wheezing.  Cardiovascular: Regular rate and rhythm without murmur, rub or gallop.   Extremities: Full range of motion. Warm and well perfused with no edema.  Skin: Intact with no obvious rashes or lesions.  Neuro: Cranial nerves I-XII grossly intact.  Psych: Alert and oriented x 3.  Appropriately dressed. Mood and affect appropriate.    Return in about 6 months (around 7/10/2023).  Please note that this dictation was created using voice recognition software. I have made every reasonable attempt to correct obvious errors, but I expect that there are errors of grammar and possibly content that I did not discover before finalizing the note.

## 2023-01-11 ENCOUNTER — NON-PROVIDER VISIT (OUTPATIENT)
Dept: CARDIOLOGY | Facility: MEDICAL CENTER | Age: 53
End: 2023-01-11
Payer: COMMERCIAL

## 2023-01-11 ENCOUNTER — OFFICE VISIT (OUTPATIENT)
Dept: CARDIOLOGY | Facility: MEDICAL CENTER | Age: 53
End: 2023-01-11
Payer: COMMERCIAL

## 2023-01-11 VITALS
BODY MASS INDEX: 37.51 KG/M2 | DIASTOLIC BLOOD PRESSURE: 84 MMHG | RESPIRATION RATE: 14 BRPM | WEIGHT: 239 LBS | SYSTOLIC BLOOD PRESSURE: 124 MMHG | OXYGEN SATURATION: 95 % | HEART RATE: 83 BPM | HEIGHT: 67 IN

## 2023-01-11 DIAGNOSIS — I49.8 BIGEMINY: ICD-10-CM

## 2023-01-11 DIAGNOSIS — I49.1 PREMATURE ATRIAL CONTRACTIONS: ICD-10-CM

## 2023-01-11 DIAGNOSIS — G47.33 OSA (OBSTRUCTIVE SLEEP APNEA): ICD-10-CM

## 2023-01-11 DIAGNOSIS — I49.3 PVC'S (PREMATURE VENTRICULAR CONTRACTIONS): ICD-10-CM

## 2023-01-11 DIAGNOSIS — I49.3 PVCS (PREMATURE VENTRICULAR CONTRACTIONS): ICD-10-CM

## 2023-01-11 DIAGNOSIS — E78.2 MIXED HYPERLIPIDEMIA: ICD-10-CM

## 2023-01-11 DIAGNOSIS — R00.8 TRIGEMINY: ICD-10-CM

## 2023-01-11 DIAGNOSIS — I10 ESSENTIAL HYPERTENSION: ICD-10-CM

## 2023-01-11 DIAGNOSIS — E66.9 OBESITY (BMI 30-39.9): ICD-10-CM

## 2023-01-11 LAB — EKG IMPRESSION: NORMAL

## 2023-01-11 PROCEDURE — 93000 ELECTROCARDIOGRAM COMPLETE: CPT | Performed by: INTERNAL MEDICINE

## 2023-01-11 PROCEDURE — 99214 OFFICE O/P EST MOD 30 MIN: CPT | Performed by: INTERNAL MEDICINE

## 2023-01-11 ASSESSMENT — FIBROSIS 4 INDEX: FIB4 SCORE: 2.3

## 2023-01-12 NOTE — PROGRESS NOTES
"Chief Complaint   Patient presents with    Premature Ventricular Contractions (PVCs)     PVC's (premature ventricular contractions)    Atrial Fibrillation     PAF (paroxysmal atrial fibrillation) (HCC)       Subjective     Zuhair Jj is a 52 y.o. male who presents today with outflow tract PVCs.  No worsening symptoms.  Normal echocardiogram and stress imaging in the past.  Retired from long-term system.  Previously tried beta-blockers without improvement.    Past Medical History:   Diagnosis Date    Allergy     Arrhythmia     Asthma     Chest pain     Close exposure to COVID-19 virus 8/6/2020    Diabetes (HCC)     Dyspnea on exertion 3/24/2020    Heart murmur     Hyperlipidemia     Hypertension     Overweight     Rheumatic fever with cardiac involvement     patient cant recall but thinks there was a \"tear\" in his heart    Sprain of medial collateral ligament of left knee 6/5/2019     Past Surgical History:   Procedure Laterality Date    OTHER      jaw reconstruction     Family History   Problem Relation Age of Onset    Cancer Father     Heart Failure Mother     Heart Disease Mother     Hypertension Mother     No Known Problems Child      Social History     Socioeconomic History    Marital status:      Spouse name: Not on file    Number of children: Not on file    Years of education: Not on file    Highest education level: Not on file   Occupational History    Occupation: Retired   Tobacco Use    Smoking status: Never    Smokeless tobacco: Never   Vaping Use    Vaping Use: Never used   Substance and Sexual Activity    Alcohol use: No    Drug use: No    Sexual activity: Yes     Partners: Female   Other Topics Concern    Not on file   Social History Narrative    Not on file     Social Determinants of Health     Financial Resource Strain: Not on file   Food Insecurity: Not on file   Transportation Needs: Not on file   Physical Activity: Not on file   Stress: Not on file   Social Connections: Not on file " "  Intimate Partner Violence: Not on file   Housing Stability: Not on file     Allergies   Allergen Reactions    Pcn [Penicillins] Anaphylaxis     Outpatient Encounter Medications as of 1/11/2023   Medication Sig Dispense Refill    QVAR REDIHALER 80 MCG/ACT inhaler Inhale 1 Puff 2 times a day. 3 Each 3    PROAIR  (90 Base) MCG/ACT Aero Soln inhalation aerosol Inhale 1-2 Puffs every four hours as needed for Shortness of Breath. 8.5 g 11    atorvastatin (LIPITOR) 40 MG Tab Take 1 Tablet by mouth every evening. 90 Tablet 3    metFORMIN ER (GLUCOPHAGE XR) 750 MG TABLET SR 24 HR Take 1 Tablet by mouth every day. 90 Tablet 3    lisinopril-hydrochlorothiazide (PRINZIDE) 20-25 MG per tablet Take 2 Tablets by mouth every day. 180 Tablet 3    aspirin 81 MG EC tablet Take 81 mg by mouth every day at 6 PM.       No facility-administered encounter medications on file as of 1/11/2023.     ROS           Objective     /84 (BP Location: Left arm, Patient Position: Sitting, BP Cuff Size: Adult)   Pulse 83   Resp 14   Ht 1.689 m (5' 6.5\")   Wt 108 kg (239 lb)   SpO2 95%   BMI 38.00 kg/m²     Physical Exam  Constitutional:       Appearance: He is well-developed.   HENT:      Head: Normocephalic and atraumatic.   Cardiovascular:      Rate and Rhythm: Normal rate and regular rhythm.      Heart sounds: No murmur heard.    No friction rub. No gallop.   Pulmonary:      Effort: Pulmonary effort is normal.      Breath sounds: Normal breath sounds.   Abdominal:      Palpations: Abdomen is soft.   Musculoskeletal:         General: Normal range of motion.      Cervical back: Normal range of motion and neck supple.   Skin:     General: Skin is warm and dry.   Neurological:      Mental Status: He is alert and oriented to person, place, and time.   Psychiatric:         Behavior: Behavior normal.         Thought Content: Thought content normal.         Judgment: Judgment normal.              Assessment & Plan     1. PVC's " (premature ventricular contractions)  EKG    Holter Monitor Study      2. Obesity (BMI 30-39.9)        3. Mixed hyperlipidemia        4. Essential hypertension        5. MAGDALENA (obstructive sleep apnea)            Medical Decision Making: Today's Assessment/Status/Plan:   1.  PVCs palpable symptomatic.  Check 24-hour Holter monitor.  Discussed antiarrhythmics or beta-blockers versus cath ablation.  Patient leaning towards catheter ablation.  2.  Obstructive sleep apnea.  3.  Obesity.  4.  Hyperlipidemia  5.  Hypertension.

## 2023-01-13 PROBLEM — E66.811 CLASS 1 OBESITY DUE TO EXCESS CALORIES WITH SERIOUS COMORBIDITY AND BODY MASS INDEX (BMI) OF 34.0 TO 34.9 IN ADULT: Status: RESOLVED | Noted: 2018-06-05 | Resolved: 2023-01-13

## 2023-01-13 PROBLEM — R79.89 ELEVATED LFTS: Status: ACTIVE | Noted: 2023-01-13

## 2023-01-13 PROBLEM — E66.09 CLASS 1 OBESITY DUE TO EXCESS CALORIES WITH SERIOUS COMORBIDITY AND BODY MASS INDEX (BMI) OF 34.0 TO 34.9 IN ADULT: Status: RESOLVED | Noted: 2018-06-05 | Resolved: 2023-01-13

## 2023-01-14 NOTE — ASSESSMENT & PLAN NOTE
Body mass index is 36.54 kg/m². Patient has been diagnosed with obesity and again has been counseled on caloric and carbohydrate restriction along with increasing exercise to 30 minutes 5 or more times a week.

## 2023-01-14 NOTE — ASSESSMENT & PLAN NOTE
Currently treated for DM, taking metformin and checking blood sugars at home, trying to do DM diet. Current A1c is 5.4% and monofilament exam is within normal limits.  Controlled

## 2023-01-14 NOTE — ASSESSMENT & PLAN NOTE
Chronic condition over the last 2 years. Levels are stable and slightly elevated. Will continue to monitor. Repeat labs in one year.

## 2023-01-14 NOTE — ASSESSMENT & PLAN NOTE
Chronic condition well controlled on atorvastatin 40 mg. Denies chest pain, shortness of breath or muscle pain. Labs have been checked in past year and are stable on current dose. Will continue prescribed medications as directed.    Controlled

## 2023-01-14 NOTE — ASSESSMENT & PLAN NOTE
On exam patient was noted to have a regularly irregular heart beat. Patient currently in a consistent quadgeminy pattern. EKG in chart. States that he is feeling different from his normal intermittent palpitations. He has also had episodes of syncope or near syncope in the recent past. He has an upcoming appointment with his cardiologist and will discuss this further at that appointment. He has been instructed to go to the ER or call 911 if he starts having chest pain or worsening of the palpitations.

## 2023-01-16 ENCOUNTER — TELEPHONE (OUTPATIENT)
Dept: CARDIOLOGY | Facility: MEDICAL CENTER | Age: 53
End: 2023-01-16
Payer: COMMERCIAL

## 2023-01-16 LAB — EKG IMPRESSION: NORMAL

## 2023-01-16 PROCEDURE — 93227 XTRNL ECG REC<48 HR R&I: CPT | Performed by: INTERNAL MEDICINE

## 2023-01-19 ENCOUNTER — PATIENT MESSAGE (OUTPATIENT)
Dept: CARDIOLOGY | Facility: MEDICAL CENTER | Age: 53
End: 2023-01-19
Payer: COMMERCIAL

## 2023-01-19 ENCOUNTER — TELEPHONE (OUTPATIENT)
Dept: CARDIOLOGY | Facility: MEDICAL CENTER | Age: 53
End: 2023-01-19
Payer: COMMERCIAL

## 2023-01-19 DIAGNOSIS — I49.3 PVC'S (PREMATURE VENTRICULAR CONTRACTIONS): ICD-10-CM

## 2023-01-19 NOTE — TELEPHONE ENCOUNTER
[employer] message sent to patient with DS recommendations.       ----- Message from Eduardo Evans M.D. sent at 1/16/2023 12:39 PM PST -----  25 % PVC's rec RFA if bothering him

## 2023-01-20 ENCOUNTER — HOSPITAL ENCOUNTER (OUTPATIENT)
Dept: RADIOLOGY | Facility: MEDICAL CENTER | Age: 53
End: 2023-01-20
Attending: NURSE PRACTITIONER
Payer: COMMERCIAL

## 2023-01-20 ENCOUNTER — OFFICE VISIT (OUTPATIENT)
Dept: SLEEP MEDICINE | Facility: MEDICAL CENTER | Age: 53
End: 2023-01-20
Payer: COMMERCIAL

## 2023-01-20 VITALS
HEIGHT: 67 IN | WEIGHT: 220 LBS | HEART RATE: 80 BPM | OXYGEN SATURATION: 94 % | RESPIRATION RATE: 16 BRPM | SYSTOLIC BLOOD PRESSURE: 122 MMHG | BODY MASS INDEX: 34.53 KG/M2 | DIASTOLIC BLOOD PRESSURE: 78 MMHG

## 2023-01-20 DIAGNOSIS — R09.81 NASAL CONGESTION: ICD-10-CM

## 2023-01-20 DIAGNOSIS — I10 ESSENTIAL HYPERTENSION: ICD-10-CM

## 2023-01-20 DIAGNOSIS — R06.02 SHORTNESS OF BREATH: ICD-10-CM

## 2023-01-20 DIAGNOSIS — J45.21 MILD INTERMITTENT ASTHMA WITH ACUTE EXACERBATION: ICD-10-CM

## 2023-01-20 DIAGNOSIS — G47.33 OSA (OBSTRUCTIVE SLEEP APNEA): ICD-10-CM

## 2023-01-20 DIAGNOSIS — I49.3 PVC'S (PREMATURE VENTRICULAR CONTRACTIONS): ICD-10-CM

## 2023-01-20 PROCEDURE — 94761 N-INVAS EAR/PLS OXIMETRY MLT: CPT | Performed by: NURSE PRACTITIONER

## 2023-01-20 PROCEDURE — 71046 X-RAY EXAM CHEST 2 VIEWS: CPT

## 2023-01-20 PROCEDURE — 99214 OFFICE O/P EST MOD 30 MIN: CPT | Performed by: NURSE PRACTITIONER

## 2023-01-20 RX ORDER — BECLOMETHASONE DIPROPIONATE HFA 80 UG/1
1 AEROSOL, METERED RESPIRATORY (INHALATION) 2 TIMES DAILY
Qty: 3 EACH | Refills: 3 | Status: ON HOLD | OUTPATIENT
Start: 2023-01-20 | End: 2023-08-25 | Stop reason: SDUPTHER

## 2023-01-20 RX ORDER — METHYLPREDNISOLONE 4 MG/1
TABLET ORAL
Qty: 21 TABLET | Refills: 0 | Status: SHIPPED
Start: 2023-01-20 | End: 2023-05-05

## 2023-01-20 ASSESSMENT — FIBROSIS 4 INDEX: FIB4 SCORE: 2.3

## 2023-01-20 NOTE — PROCEDURES
Multi-Ox Readings  Multi Ox #1 Room air   O2 sat % at rest 95   O2 sat % on exertion 93   O2 sat average on exertion     Multi Ox #2     O2 sat % at rest     O2 sat % on exertion     O2 sat average on exertion       Oxygen Use     Oxygen Frequency     Duration of need     Is the patient mobile within the home?     CPAP Use?     BIPAP Use?     Servo Titration

## 2023-01-20 NOTE — PROGRESS NOTES
"Chief Complaint   Patient presents with    Follow-Up     Asthma: Last seen 6/13/22 Dr. Domínguez    MAGDALENA, SOB    Results     Brandon 12/28/22       HPI:      Mr. Jj is a 51 y/o male patient who is in today for MAGDALENA and asthma f/u with brandon results, SOB.  Patient is retired from the [employer].  Patient lives in [place].  Patient is a former [facility] patient. 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    Patient today reports increased nasal congestion and some lightheadedness.  He is also complaining of increased shortness of breath at rest and with activity.  He states that he travel to [place] about a month and a half ago but is not aware of being in contact with anyone that may have had the flu or COVID-19.  He did however have bronchitis around the same time as well as an ear infection, left worse than right, for which she was treated by his PCP with erythromycin.  He feels that the shortness of breath is not worse but is persistent and has not improved.  He also reports chest tightness, intermittent wheezing and coughing typically in the morning with little clear phlegm production.  He denies any fever, chills, nausea or vomiting.  He has been using his inhalers as directed which are helpful.  He is also utilizing an over-the-counter nasal decongestant.  Patient did recently again see his PCP about a week ago.  He does complain of some palpitations and plans to go see his cardiologist today.  Patient tried over-the-counter Mucinex but this was not effective.  The patient defers going to the ER.  Patient reported that during the spirometry testing he completed in December 2022 he \"blacked out 4 times\" and wonders why this was not documented.  We will review with respiratory technician and addend previous report if indicated.  Patient states his respiratory symptoms are typically more triggered with cold " weather.    Patient has a Raven RespirRuckPacks CPAP machine.  Patient has registered the device and received a replacement Raven Respironics DreamStation 2 CPAP earlier this month which he has been using.  The compliance however is not available for the DreamStation 2 machine.  Compliance report from 12/14/2022 - 1/12/2023 was downloaded and reviewed with the patient which showed CPAP 13 cmH2O, 13.3% compliance, 5 hrs 9 min use, AHI of 1.9. He is tolerating the pressure and mask well. He goes to bed between 11 pm-12 AM and wakes up between 6-7 am. He denies morning headache or snoring.  He sleeps much better when he uses the CPAP and uses it nightly.  He is not as tired during the day he usually tries to stay active by walking.      Pulmonary/Cardiac/Sleep Study History:   Chest X-ray from 1/20/23 indicated the heart is normal in size. No pulmonary infiltrates or consolidations are noted. No pleural effusions are appreciated.     Mutli Ox from 1/20/2023 indicated on room air at rest SPO2 was 95% and with exertion on room air SPO2 was 93%.    Spirometry from 12/28/22 indicated there is no significant obstructive ventilatory defect on spirometry. There is no significant bronchodilator response. Compared to prior testing in 2021, FEV1 has declined slightly from 2.91 L to 2.70 L, or 78% predicted. Flow volume loop is consistent with the above interpretation. FVC 3.36L or 77% predicted, FEV1 2.70L or 78% predicted, FEV1/FVC ratio 80%.    Overnight oximetry 1/10/2017 on his current pressures indicate a mean 02 saturation of 96.6%. He did have 1 episode of desaturation. However, it appears his mask may have been off at that time.     PSG titration from 12/10/07 indicated successful titration of nasal CPAP to an optimal pressure of 10 cm with an AHI of 1/h, and average O2 saturation of 94%.    PSG from 11/5/07 indicated an AHI of 15.5 with a low oxygen saturation.     ROS:  Constitutional: Denies fevers, chills, sweats,  "fatigue, weight loss  Eyes: Denies glasses, vision loss, pain, drainage, double vision  Ears/Nose/Mouth/Throat: Denies rhinitis, + nasal congestion, denies ear ache, difficulty hearing, sore throat, persistent hoarseness, decayed teeth/toothache  Cardiovascular: Denies chest pain, + tightness, + palpitations, denies swelling in feet/legs, fainting, difficulty breathing when laying down  Respiratory: + shortness of breath, + cough in the am, + very small amount of clear sputum, + intermittent wheezing, denies painful breathing, coughing up blood  GI: Denies heartburn, difficulty swallowing, nausea, vomiting, abdominal pain, diarrhea, constiptation  : Denies frequent urination, painful urination  Integumentary: Denies rashes, lumps or color changes  Neurological: Denies frequent headaches, dizziness, weakness  Sleep: Denies daytime sleepiness, loud snoring, stops breathing during sleep, waking up gasping for air, insomnia, morning headaches        Past Medical History:   Diagnosis Date    Allergy     Arrhythmia     Asthma     Chest pain     Close exposure to COVID-19 virus 8/6/2020    Diabetes (HCC)     Dyspnea on exertion 3/24/2020    Heart murmur     Hyperlipidemia     Hypertension     Overweight     Rheumatic fever with cardiac involvement     patient cant recall but thinks there was a \"tear\" in his heart    Sprain of medial collateral ligament of left knee 6/5/2019       Past Surgical History:   Procedure Laterality Date    OTHER      jaw reconstruction       Family History   Problem Relation Age of Onset    Cancer Father     Heart Failure Mother     Heart Disease Mother     Hypertension Mother     No Known Problems Child        Social History     Socioeconomic History    Marital status:      Spouse name: Not on file    Number of children: Not on file    Years of education: Not on file    Highest education level: Not on file   Occupational History    Occupation: Retired   Tobacco Use    Smoking status: " Never    Smokeless tobacco: Never   Vaping Use    Vaping Use: Never used   Substance and Sexual Activity    Alcohol use: No    Drug use: No    Sexual activity: Yes     Partners: Female   Other Topics Concern    Not on file   Social History Narrative    Not on file     Social Determinants of Health     Financial Resource Strain: Not on file   Food Insecurity: Not on file   Transportation Needs: Not on file   Physical Activity: Not on file   Stress: Not on file   Social Connections: Not on file   Intimate Partner Violence: Not on file   Housing Stability: Not on file       Allergies as of 01/20/2023 - Reviewed 01/20/2023   Allergen Reaction Noted    Pcn [penicillins] Anaphylaxis 08/30/2016        Vitals:  Vitals:    01/20/23 1320   BP: 122/78   Pulse: 80   Resp: 16   SpO2: 94%       Current medications as of today   Current Outpatient Medications   Medication Sig Dispense Refill    ATORVASTATIN CALCIUM PO Take 40 mg by mouth.      PROAIR  (90 Base) MCG/ACT Aero Soln inhalation aerosol Inhale 1-2 Puffs every four hours as needed for Shortness of Breath. 8.5 g 6    QVAR REDIHALER 80 MCG/ACT inhaler Inhale 1 Puff 2 times a day. 3 Each 3    methylPREDNISolone (MEDROL DOSEPAK) 4 MG Tablet Therapy Pack Take as directed. 21 Tablet 0    atorvastatin (LIPITOR) 40 MG Tab Take 1 Tablet by mouth every evening. 90 Tablet 3    metFORMIN ER (GLUCOPHAGE XR) 750 MG TABLET SR 24 HR Take 1 Tablet by mouth every day. 90 Tablet 3    lisinopril-hydrochlorothiazide (PRINZIDE) 20-25 MG per tablet Take 2 Tablets by mouth every day. 180 Tablet 3    aspirin 81 MG EC tablet Take 81 mg by mouth every day at 6 PM.       No current facility-administered medications for this visit.         Physical Exam:  Appearance: Well developed, well nourished, no acute distress  Eyes: PERRL, EOM intact, sclera white, conjunctiva moist  Ears: no lesions or deformities  Hearing: grossly intact  Nose: no lesions or deformities  Respiratory effort: no  intercostal retractions or use of accessory muscles  Lung auscultation: no rales, or wheezes bilaterally. Clear to ausculation bilaterally, but noted intermittent rhonchi with expiration that resolved with deep breathing.   Heart auscultation: no murmur rub or gallop, RRate, but irregularly irregular rhythm noted  Extremities: no cyanosis or edema  Abdomen: soft, large  Gait and Station: normal  Digits and nails: no clubbing, cyanosis, petechiae or nodes.  Cranial nerves: grossly intact  Skin: no visible rashes, lesions or ulcers noted  Orientation: Oriented to time, person and place  Mood and affect: mood and affect appropriate, normal interaction with examiner  Judgement: Intact    Assessment:  1. Mild intermittent asthma with acute exacerbation  PROAIR  (90 Base) MCG/ACT Aero Soln inhalation aerosol    QVAR REDIHALER 80 MCG/ACT inhaler    methylPREDNISolone (MEDROL DOSEPAK) 4 MG Tablet Therapy Pack      2. Shortness of breath  DX-CHEST-2 VIEWS    Multiple Oximetry    PROAIR  (90 Base) MCG/ACT Aero Soln inhalation aerosol    methylPREDNISolone (MEDROL DOSEPAK) 4 MG Tablet Therapy Pack    Multiple Oximetry      3. Nasal congestion        4. MAGDALENA (obstructive sleep apnea)  DME Mask and Supplies      5. PVC's (premature ventricular contractions)        6. Essential hypertension        7. BMI 34.0-34.9,adult              Plan    1-2.  Acute exacerbation in symptoms with increased shortness of breath.  Continue Qvar 80 mcg 1 puff twice daily, ProAir  mcg 1 to 2 puffs every 4-6 hours as needed shortness of breath which were filled today.  Start Medrol Dosepak 4 mg take as directed then discontinue #21.  Patient was advised to gargle and spit twice, followed by gargle and swallow, followed by brushing the teeth and tongue thoroughly to avoid thrush.    *Order Chest X-ray.  Will message patient with results when available.   Chest X-ray from 1/20/23 indicated the heart is normal in size. No pulmonary  infiltrates or consolidations are noted. No pleural effusions are appreciated.    *Mutli Ox from 1/20/2023 indicated on room air at rest SPO2 was 95% and with exertion on room air SPO2 was 93%.    *Spirometry from 12/28/22 indicated there is no significant obstructive ventilatory defect on spirometry. There is no significant bronchodilator response. Compared to prior testing in 2021, FEV1 has declined slightly from 2.91 L to 2.70 L, or 78% predicted. Flow volume loop is consistent with the above interpretation. FVC 3.36L or 77% predicted, FEV1 2.70L or 78% predicted, FEV1/FVC ratio 80%.    *Order PFT in 3 months.  Postpone Prevnar 20 vaccine today.    3. Continue using OTC nasal decongestant but review if it is safe to take for patients with high blood pressure or PVCs.  Alternatives could include Flonase, saline rinses, adding a humidifier or elevating the head of the bed.  4. Patient has a Raven Respironics CPAP machine.  Patient has registered the device and received a replacement Raven Respironics DreamStation 2 CPAP earlier this month which he has been using.  The compliance however is not available for the DreamStation 2 machine.  Compliance report from 12/14/2022 - 1/12/2023 was downloaded and reviewed with the patient which showed CPAP 13 cmH2O, 13.3% compliance, 5 hrs 9 min use, AHI of 1.9. Patient is compliant and benefiting from CPAP therapy for management of MAGDALENA. Advised patient to use the CPAP every night for more than four hours for optimal health benefit.  Patient is advised to bring DreamStation 2 CPAP machine to the next follow-up office visit and check with [employer] to see if modem can be set up for wireless access.    *DME order ([employer]) for mask (FFM or MOC) and supplies was placed today. Continue to clean mask and supplies weekly with soap and water, and change supplies per insurance guidelines.     *Sleep hygiene discussed. Recommend keeping a set sleep/wake schedule. Logging enough hours of  sleep. Limiting/Avoiding naps. No caffeine after noon and no heavy meals in the evening.     5-6. Continue to f/u with cardiology, Dr. Evans. Patient is going to see cardiology today.  Irregularly irregular rhythm noted on exam today.  Patient deferred going to the ER.  7. Encouraged a healthy diet and exercise to help with weight loss and management. If your BMI is 25-29.9 you are overweight. If your BMI is 30 or greater you are obese. To lose weight eat less, move more, or both. Any diet that reduces caloric intake can help with weight loss. Extra weight may reduce your lifespan. Avoid dramatic unsustainable dietary changes that result in the yo-yo effect (down then back up.)  Usually small modifications in diet and exercise are easier to stick with.  8. Follow up with appropriate healthcare providers for all other medical problems.  9. F/u in 3 months for MAGDALENA and asthma, or sooner if needed.  Patient is advised should his symptoms get worse or unmanageable to call 911 or go to the ER, he understood.      JULIA Malhotra.      This dictation was created using voice recognition software. The accuracy of the dictation is limited to the abilities of the software. I expect there may be some errors of grammar and possibly content.

## 2023-01-24 ENCOUNTER — TELEPHONE (OUTPATIENT)
Dept: CARDIOLOGY | Facility: MEDICAL CENTER | Age: 53
End: 2023-01-24
Payer: COMMERCIAL

## 2023-01-24 NOTE — TELEPHONE ENCOUNTER
Dr. Evans,    For the PVC ablation, are there any medications you would like this patient to hold for this procedure?    Thank You,  Trinity

## 2023-01-24 NOTE — TELEPHONE ENCOUNTER
Patient scheduled for PVC ablation on 2-28-23 with Dr. Evans. Patient has been instructed to check in at 6:00 for 7:30 case time. Message sent to authorizations. Emailed Carto.

## 2023-02-24 ENCOUNTER — PRE-ADMISSION TESTING (OUTPATIENT)
Dept: ADMISSIONS | Facility: MEDICAL CENTER | Age: 53
End: 2023-02-24
Attending: INTERNAL MEDICINE
Payer: COMMERCIAL

## 2023-02-24 DIAGNOSIS — Z01.812 PRE-OPERATIVE LABORATORY EXAMINATION: ICD-10-CM

## 2023-02-24 DIAGNOSIS — Z01.810 PRE-OPERATIVE CARDIOVASCULAR EXAMINATION: ICD-10-CM

## 2023-02-24 LAB
ALBUMIN SERPL BCP-MCNC: 4.8 G/DL (ref 3.2–4.9)
ALBUMIN/GLOB SERPL: 1.8 G/DL
ALP SERPL-CCNC: 72 U/L (ref 30–99)
ALT SERPL-CCNC: 78 U/L (ref 2–50)
ANION GAP SERPL CALC-SCNC: 16 MMOL/L (ref 7–16)
AST SERPL-CCNC: 80 U/L (ref 12–45)
BASOPHILS # BLD AUTO: 1 % (ref 0–1.8)
BASOPHILS # BLD: 0.05 K/UL (ref 0–0.12)
BILIRUB SERPL-MCNC: 0.8 MG/DL (ref 0.1–1.5)
BUN SERPL-MCNC: 8 MG/DL (ref 8–22)
CALCIUM ALBUM COR SERPL-MCNC: 9.2 MG/DL (ref 8.5–10.5)
CALCIUM SERPL-MCNC: 9.8 MG/DL (ref 8.5–10.5)
CHLORIDE SERPL-SCNC: 95 MMOL/L (ref 96–112)
CO2 SERPL-SCNC: 23 MMOL/L (ref 20–33)
CREAT SERPL-MCNC: 0.61 MG/DL (ref 0.5–1.4)
EKG IMPRESSION: NORMAL
EOSINOPHIL # BLD AUTO: 0.03 K/UL (ref 0–0.51)
EOSINOPHIL NFR BLD: 0.6 % (ref 0–6.9)
ERYTHROCYTE [DISTWIDTH] IN BLOOD BY AUTOMATED COUNT: 42.6 FL (ref 35.9–50)
GFR SERPLBLD CREATININE-BSD FMLA CKD-EPI: 114 ML/MIN/1.73 M 2
GLOBULIN SER CALC-MCNC: 2.7 G/DL (ref 1.9–3.5)
GLUCOSE SERPL-MCNC: 102 MG/DL (ref 65–99)
HCT VFR BLD AUTO: 46.4 % (ref 42–52)
HGB BLD-MCNC: 16.6 G/DL (ref 14–18)
IMM GRANULOCYTES # BLD AUTO: 0.03 K/UL (ref 0–0.11)
IMM GRANULOCYTES NFR BLD AUTO: 0.6 % (ref 0–0.9)
INR PPP: 1.04 (ref 0.87–1.13)
LYMPHOCYTES # BLD AUTO: 1.23 K/UL (ref 1–4.8)
LYMPHOCYTES NFR BLD: 24.7 % (ref 22–41)
MCH RBC QN AUTO: 34.1 PG (ref 27–33)
MCHC RBC AUTO-ENTMCNC: 35.8 G/DL (ref 33.7–35.3)
MCV RBC AUTO: 95.3 FL (ref 81.4–97.8)
MONOCYTES # BLD AUTO: 0.57 K/UL (ref 0–0.85)
MONOCYTES NFR BLD AUTO: 11.5 % (ref 0–13.4)
NEUTROPHILS # BLD AUTO: 3.06 K/UL (ref 1.82–7.42)
NEUTROPHILS NFR BLD: 61.6 % (ref 44–72)
NRBC # BLD AUTO: 0 K/UL
NRBC BLD-RTO: 0 /100 WBC
PLATELET # BLD AUTO: 182 K/UL (ref 164–446)
PMV BLD AUTO: 8.7 FL (ref 9–12.9)
POTASSIUM SERPL-SCNC: 3.7 MMOL/L (ref 3.6–5.5)
PROT SERPL-MCNC: 7.5 G/DL (ref 6–8.2)
PROTHROMBIN TIME: 13.5 SEC (ref 12–14.6)
RBC # BLD AUTO: 4.87 M/UL (ref 4.7–6.1)
SODIUM SERPL-SCNC: 134 MMOL/L (ref 135–145)
TSH SERPL DL<=0.005 MIU/L-ACNC: 1 UIU/ML (ref 0.38–5.33)
WBC # BLD AUTO: 5 K/UL (ref 4.8–10.8)

## 2023-02-24 PROCEDURE — 84443 ASSAY THYROID STIM HORMONE: CPT

## 2023-02-24 PROCEDURE — 85610 PROTHROMBIN TIME: CPT

## 2023-02-24 PROCEDURE — 93010 ELECTROCARDIOGRAM REPORT: CPT | Performed by: INTERNAL MEDICINE

## 2023-02-24 PROCEDURE — 85025 COMPLETE CBC W/AUTO DIFF WBC: CPT

## 2023-02-24 PROCEDURE — 93005 ELECTROCARDIOGRAM TRACING: CPT

## 2023-02-24 PROCEDURE — 36415 COLL VENOUS BLD VENIPUNCTURE: CPT

## 2023-02-24 PROCEDURE — 80053 COMPREHEN METABOLIC PANEL: CPT

## 2023-02-24 ASSESSMENT — FIBROSIS 4 INDEX: FIB4 SCORE: 2.3

## 2023-02-28 ENCOUNTER — HOSPITAL ENCOUNTER (OUTPATIENT)
Facility: MEDICAL CENTER | Age: 53
End: 2023-02-28
Attending: INTERNAL MEDICINE | Admitting: INTERNAL MEDICINE
Payer: COMMERCIAL

## 2023-02-28 ENCOUNTER — APPOINTMENT (OUTPATIENT)
Dept: CARDIOLOGY | Facility: MEDICAL CENTER | Age: 53
End: 2023-02-28
Attending: INTERNAL MEDICINE
Payer: COMMERCIAL

## 2023-02-28 VITALS
WEIGHT: 229.76 LBS | DIASTOLIC BLOOD PRESSURE: 76 MMHG | HEART RATE: 70 BPM | TEMPERATURE: 97.6 F | OXYGEN SATURATION: 94 % | HEIGHT: 66 IN | BODY MASS INDEX: 36.93 KG/M2 | RESPIRATION RATE: 18 BRPM | SYSTOLIC BLOOD PRESSURE: 140 MMHG

## 2023-02-28 DIAGNOSIS — I49.3 PVC'S (PREMATURE VENTRICULAR CONTRACTIONS): ICD-10-CM

## 2023-02-28 LAB
EKG IMPRESSION: NORMAL
GLUCOSE BLD STRIP.AUTO-MCNC: 132 MG/DL (ref 65–99)

## 2023-02-28 PROCEDURE — 700111 HCHG RX REV CODE 636 W/ 250 OVERRIDE (IP)

## 2023-02-28 PROCEDURE — 99152 MOD SED SAME PHYS/QHP 5/>YRS: CPT | Performed by: INTERNAL MEDICINE

## 2023-02-28 PROCEDURE — 700102 HCHG RX REV CODE 250 W/ 637 OVERRIDE(OP): Performed by: INTERNAL MEDICINE

## 2023-02-28 PROCEDURE — 700101 HCHG RX REV CODE 250

## 2023-02-28 PROCEDURE — 93654 COMPRE EP EVAL TX VT: CPT | Performed by: INTERNAL MEDICINE

## 2023-02-28 PROCEDURE — 160046 HCHG PACU - 1ST 60 MINS PHASE II

## 2023-02-28 PROCEDURE — 700105 HCHG RX REV CODE 258: Performed by: INTERNAL MEDICINE

## 2023-02-28 PROCEDURE — 82962 GLUCOSE BLOOD TEST: CPT

## 2023-02-28 PROCEDURE — 160036 HCHG PACU - EA ADDL 30 MINS PHASE I

## 2023-02-28 PROCEDURE — 160035 HCHG PACU - 1ST 60 MINS PHASE I

## 2023-02-28 PROCEDURE — 93010 ELECTROCARDIOGRAM REPORT: CPT | Mod: 59 | Performed by: INTERNAL MEDICINE

## 2023-02-28 PROCEDURE — 93005 ELECTROCARDIOGRAM TRACING: CPT | Performed by: INTERNAL MEDICINE

## 2023-02-28 PROCEDURE — 160002 HCHG RECOVERY MINUTES (STAT)

## 2023-02-28 PROCEDURE — A9270 NON-COVERED ITEM OR SERVICE: HCPCS | Performed by: INTERNAL MEDICINE

## 2023-02-28 PROCEDURE — 93662 INTRACARDIAC ECG (ICE): CPT | Mod: 26 | Performed by: INTERNAL MEDICINE

## 2023-02-28 PROCEDURE — 93623 PRGRMD STIMJ&PACG IV RX NFS: CPT | Mod: 26 | Performed by: INTERNAL MEDICINE

## 2023-02-28 PROCEDURE — 93623 PRGRMD STIMJ&PACG IV RX NFS: CPT

## 2023-02-28 RX ORDER — ONDANSETRON 2 MG/ML
4 INJECTION INTRAMUSCULAR; INTRAVENOUS EVERY 6 HOURS PRN
Status: DISCONTINUED | OUTPATIENT
Start: 2023-02-28 | End: 2023-02-28 | Stop reason: HOSPADM

## 2023-02-28 RX ORDER — MIDAZOLAM HYDROCHLORIDE 1 MG/ML
INJECTION INTRAMUSCULAR; INTRAVENOUS
Status: COMPLETED
Start: 2023-02-28 | End: 2023-02-28

## 2023-02-28 RX ORDER — HEPARIN SODIUM 200 [USP'U]/100ML
INJECTION, SOLUTION INTRAVENOUS
Status: COMPLETED
Start: 2023-02-28 | End: 2023-02-28

## 2023-02-28 RX ORDER — HEPARIN SODIUM 1000 [USP'U]/ML
INJECTION, SOLUTION INTRAVENOUS; SUBCUTANEOUS
Status: COMPLETED
Start: 2023-02-28 | End: 2023-02-28

## 2023-02-28 RX ORDER — DIPHENHYDRAMINE HYDROCHLORIDE 50 MG/ML
INJECTION INTRAMUSCULAR; INTRAVENOUS
Status: COMPLETED
Start: 2023-02-28 | End: 2023-02-28

## 2023-02-28 RX ORDER — LIDOCAINE HYDROCHLORIDE 20 MG/ML
INJECTION, SOLUTION INFILTRATION; PERINEURAL
Status: COMPLETED
Start: 2023-02-28 | End: 2023-02-28

## 2023-02-28 RX ORDER — OXYCODONE HYDROCHLORIDE 5 MG/1
5 TABLET ORAL
Status: DISCONTINUED | OUTPATIENT
Start: 2023-02-28 | End: 2023-02-28 | Stop reason: HOSPADM

## 2023-02-28 RX ORDER — PROTAMINE SULFATE 10 MG/ML
INJECTION, SOLUTION INTRAVENOUS
Status: COMPLETED
Start: 2023-02-28 | End: 2023-02-28

## 2023-02-28 RX ORDER — CLONIDINE HYDROCHLORIDE 0.1 MG/1
0.1 TABLET ORAL 4 TIMES DAILY PRN
Status: DISCONTINUED | OUTPATIENT
Start: 2023-02-28 | End: 2023-02-28 | Stop reason: HOSPADM

## 2023-02-28 RX ORDER — ISOPROTERENOL HYDROCHLORIDE 0.2 MG/ML
INJECTION, SOLUTION INTRAVENOUS
Status: COMPLETED
Start: 2023-02-28 | End: 2023-02-28

## 2023-02-28 RX ORDER — ACETAMINOPHEN 325 MG/1
650 TABLET ORAL EVERY 4 HOURS PRN
Status: DISCONTINUED | OUTPATIENT
Start: 2023-02-28 | End: 2023-02-28 | Stop reason: HOSPADM

## 2023-02-28 RX ORDER — TRAMADOL HYDROCHLORIDE 50 MG/1
50 TABLET ORAL EVERY 6 HOURS PRN
Status: DISCONTINUED | OUTPATIENT
Start: 2023-02-28 | End: 2023-02-28 | Stop reason: HOSPADM

## 2023-02-28 RX ORDER — SODIUM CHLORIDE, SODIUM LACTATE, POTASSIUM CHLORIDE, CALCIUM CHLORIDE 600; 310; 30; 20 MG/100ML; MG/100ML; MG/100ML; MG/100ML
INJECTION, SOLUTION INTRAVENOUS CONTINUOUS
Status: ACTIVE | OUTPATIENT
Start: 2023-02-28 | End: 2023-02-28

## 2023-02-28 RX ADMIN — FENTANYL CITRATE 100 MCG: 50 INJECTION, SOLUTION INTRAMUSCULAR; INTRAVENOUS at 10:39

## 2023-02-28 RX ADMIN — FENTANYL CITRATE 100 MCG: 50 INJECTION, SOLUTION INTRAMUSCULAR; INTRAVENOUS at 09:05

## 2023-02-28 RX ADMIN — TRAMADOL HYDROCHLORIDE 50 MG: 50 TABLET, COATED ORAL at 12:02

## 2023-02-28 RX ADMIN — HEPARIN SODIUM: 1000 INJECTION, SOLUTION INTRAVENOUS; SUBCUTANEOUS at 08:24

## 2023-02-28 RX ADMIN — LIDOCAINE HYDROCHLORIDE: 20 INJECTION, SOLUTION INFILTRATION; PERINEURAL at 08:15

## 2023-02-28 RX ADMIN — DIPHENHYDRAMINE HYDROCHLORIDE 25 MG: 50 INJECTION, SOLUTION INTRAMUSCULAR; INTRAVENOUS at 10:20

## 2023-02-28 RX ADMIN — PROTAMINE SULFATE 50 MG: 10 INJECTION, SOLUTION INTRAVENOUS at 10:39

## 2023-02-28 RX ADMIN — HEPARIN SODIUM 4000 UNITS: 200 INJECTION, SOLUTION INTRAVENOUS at 08:15

## 2023-02-28 RX ADMIN — MIDAZOLAM HYDROCHLORIDE 2 MG: 1 INJECTION, SOLUTION INTRAMUSCULAR; INTRAVENOUS at 10:39

## 2023-02-28 RX ADMIN — CLONIDINE HYDROCHLORIDE 0.1 MG: 0.1 TABLET ORAL at 12:02

## 2023-02-28 RX ADMIN — ISOPROTERENOL HYDROCHLORIDE 0.2 MG: 0.2 INJECTION, SOLUTION INTRAMUSCULAR; INTRAVENOUS at 10:19

## 2023-02-28 RX ADMIN — MIDAZOLAM HYDROCHLORIDE 2 MG: 1 INJECTION, SOLUTION INTRAMUSCULAR; INTRAVENOUS at 09:05

## 2023-02-28 RX ADMIN — FENTANYL CITRATE 100 MCG: 50 INJECTION, SOLUTION INTRAMUSCULAR; INTRAVENOUS at 09:18

## 2023-02-28 RX ADMIN — ACETAMINOPHEN 650 MG: 325 TABLET, FILM COATED ORAL at 11:35

## 2023-02-28 RX ADMIN — MIDAZOLAM HYDROCHLORIDE 2 MG: 1 INJECTION, SOLUTION INTRAMUSCULAR; INTRAVENOUS at 09:04

## 2023-02-28 RX ADMIN — HEPARIN SODIUM: 1000 INJECTION, SOLUTION INTRAVENOUS; SUBCUTANEOUS at 10:19

## 2023-02-28 RX ADMIN — SODIUM CHLORIDE, POTASSIUM CHLORIDE, SODIUM LACTATE AND CALCIUM CHLORIDE: 600; 310; 30; 20 INJECTION, SOLUTION INTRAVENOUS at 06:20

## 2023-02-28 ASSESSMENT — FIBROSIS 4 INDEX: FIB4 SCORE: 2.69

## 2023-02-28 ASSESSMENT — PAIN DESCRIPTION - PAIN TYPE
TYPE: SURGICAL PAIN

## 2023-02-28 NOTE — PROGRESS NOTES
Seen in afternoon same day discharge EP rounds.  S/P PVC ablation with ablation of RVOT PVCs by Dr Evans  for symptomatic high burden PVCs.      Conclusions per Dr Evans's Op Note dated 2/28/23:  Procedure(s) Performed:   1. Electrophysiology with radiofrequency ablation of PVC from RVOT  circuit.  2. Programmed stimulation following drug infusion  3. Moderate sedation administered by RN and supervised by physician  4.  Intracardiac echocardiography  RESULTS:  1. Baseline rhythm: SR with RVOT PVCs  2. Baseline interval: RR interval 773 msec, KS interval 150 msec QRS duration 90 msec QT interval 325 msec   3. Pace mapping of RVOT with best map below the valve in the free wall.  4.  Ablation this location with resolution of PVCs.  Just occasional PVCs seen on Isuprel washout.  TOTAL FLUORO TIME: 4.8 min, 510 mg  ABLATION TIME: 763 sec  CONCLUSION:  1. Baaseline sinus rhythm with RV outflow tract PVCs  2. RFA at free wall the right ventricle     Monitored rhythm has been sinus with rare PVC during monitored recovery.  Vital signs are stable. Right femoral access site is clean and dry.  There is no significant ecchymosis.  No hematoma appreciated. Bedrest not quite complete, will be cleared for phase II transfer if remains stable post ambulation.       Discharge instructions discussed with patient:  [facility] Post Ablation Patient Instructions:  No lifting > 10 lbs x 1 week.    No soaking in baths, hot tubs, pools x 1 week.  May shower the day after discharge and take off groin dressings and leave uncovered.  Continue to monitor sites daily for warmth, redness, discolored drainage.  It is common to have a small lump in the area where the cather was (usually the size of a small marble); this will go away but takes approximately 6 weeks to normalize.   3.   Please take all medications as prescribed to you; please do not stop any medications prescribed post ablation unless directed by  your healthcare provider.    4.   Please walk and take deep breaths after discharge.  After discharge, if  experiences neurological changes/signs of stroke, high fever, you should be seen in the emergency dept.   5   It is possible you may experience some chest discomfort or chest tightness post ablation.  This is usually secondary to inflammation and irritation of the tissues at the area of the ablation.  If this occurs, it is advised to try 400 mg of Ibuprofen with food as needed up to three times a day for a maximum of two days.  This should help to decrease pain and tissue inflammation.          **Please notify the office ([phone]) if this occurs.         ** DO NOT TAKE Ibuprofen IF HISTORY OF SIGNIFICANT BLEEDING OR KIDNEY DISEASE WITHOUT DISCUSSING WITH YOUR CARDIOLOGY PROVIDER FIRST.          ** If pain becomes severe or you have additional symptoms you may need to be medically evaluated; please contact the cardiology office ([phone]) for further direction.   7.  Please contact call our office ([phone]) or message via [employer] if you have any questions or concerns post procedurally.  8. You need to be seen for post ablation follow up 2-4 weeks post procedure.  If you do not have a follow up appointment scheduled, please call [phone] to schedule your follow up appointment.

## 2023-02-28 NOTE — CATH LAB
Electrophysiology Procedure Note  [facility]    PROCEDURE PERFORMED:  1. Electrophysiology with radiofrequency ablation of PVC from RVOT  circuit.  2. Programmed stimulation following drug infusion  3. Moderate sedation administered by RN and supervised by physician  4. Intracardiac echocardiography    PHYSICIAN:  Eduardo Evans MD     ASSISTANT:  No assistants used.     ANESTHESIA:  Moderate sedation,  start time 755 AM, stop time 1040 AM  the moderate sedation document has been reviewed, signed and scanned into media     ANESTHESIOLOGIST: None used     INDICATION: Recurrent PVC's outflow tract despite meds     COMPLICATIONS:  None.     PREPROCEDURE EKG:  SR  With PVC's     POST-PROCEDURE EKG:  SR     ESTIMATED BLOOD LOSS:  Less than 20 mL.     NARRATIVE:  After risks and benefits were explained to the patient, the   patient gave informed consent.  The patient was brought to the   electrophysiology lab in fasting state.  The patient was prepped and draped in  usual sterile manner.   Next, 2% lidocaine was infiltrated subcutaneously over the right   femoral region.  An 6-Azerbaijani and two 8-Azerbaijani sheaths were placed in the right   femoral vein using modified Seldinger technique.  Baseline the patient had RVOT PVCs.  Positive in lead one, transition V 3 to V 4.    Advanced mapping was then performed using Decanav and 3.5 mm deflectable irrigated force contact catheter ([employer]ima Diagnosticsense SmartTouch D/F).  Pace mapping performed at multiple locations in RVOT with best maps at free wall below the valve  Ablation performed at 30 higgins at locations of greater then 93-98% concordance on PASO map and good activation mapping.  Less PVCs seen but occasional similar morphology PVCs.  An 8 Azerbaijani arterial line was placed via the right femoral artery.  Intracardiac echocardiography mapping of the aorta and aortic valve was performed.  Mapping was performed in the LV outflow tract using the ablation catheter.  No early  activation was seen in this location  Post ablation minimal PVCs during Isuprel washout.  I personally supervised the administration of moderate sedation by the RN and observed the level of consciousness and physiologic status throughout the procedure.  At the conclusion of the procedure, the sheaths were removed and hemostasis was obtained with direct manual compression.    RESULTS:  1. Baseline rhythm: SR with RVOT PVCs  2. Baseline interval: RR interval 773 msec, NE interval 150 msec QRS duration 90 msec QT interval 325 msec   3. Pace mapping of RVOT with best map below the valve in the free wall.  4. Ablation this location with resolution of PVCs.  Just occasional PVCs seen on Isuprel washout.    TOTAL FLUORO TIME: 4.8 min, 510 mg    ABLATION TIME: 763 sec    CONCLUSION:  1. Baaseline sinus rhythm with RV outflow tract PVCs  2. RFA at free wall the right ventricle

## 2023-02-28 NOTE — DISCHARGE INSTRUCTIONS
[facility] Post Ablation Patient Instructions:  No lifting > 10 lbs x 1 week.      No soaking in baths, hot tubs, pools x 1 week.  May shower the day after discharge and take off groin dressings and leave  sites uncovered.  Continue to monitor sites daily for warmth, redness, discolored drainage.  It is common to have a small lump in the area where the cather was (usually the size of a marble); this will go away but takes approximately 6 weeks to normalize.     3.   Please take all medications as prescribed to you; please do not stop any medications prescribed post ablation unless directed by your healthcare provider.      4.   Please walk and take deep breaths after discharge.  After discharge, if you experience neurological changes/signs of stroke or high fever you should be seen in the emergency dept.     5.   It is possible you may experience some chest discomfort or chest tightness post ablation.  This is usually secondary to inflammation and irritation of the tissues at the area of the ablation.  If this occurs, it is advised to try 400 mg of Ibuprofen with food as needed up to three times a day for a maximum of two days.  This should help to decrease pain and tissue inflammation.          **Please notify the office ([phone]) if this occurs.         ** DO NOT TAKE Ibuprofen IF HISTORY OF ALLERGY, SIGNIFICANT BLEEDING OR KIDNEY DISEASE WITHOUT DISCUSSING WITH YOUR CARDIOLOGY PROVIDER FIRST.          ** If pain becomes severe or you have additional symptoms you may need to be medically evaluated; please contact the cardiology office ([phone]) for further direction.     6.  Please contact call our office ([phone]) or message via [employer] if you have any questions or concerns post procedurally.    7. You need to be seen for post ablation follow up 3-4 weeks post procedure. An appointment is scheduled for you.  Please contact the office ([phone]) if you  need to change your appointment.      HOME CARE INSTRUCTIONS    ACTIVITY: Rest and take it easy for the first 24 hours.  A responsible adult is recommended to remain with you during that time.  It is normal to feel sleepy.  We encourage you to not do anything that requires balance, judgment or coordination.    FOR 24 HOURS DO NOT:  Drive, operate machinery or run household appliances.  Drink beer or alcoholic beverages.  Make important decisions or sign legal documents.    DIET:  resume your diet      SURGICAL DRESSING/BATHING: Okay to remove dressing in 24 hours and shower after dressing is removed.     MEDICATIONS: Resume taking daily medication.  Take prescribed pain medication with food.  If no medication is prescribed, you may take non-aspirin pain medication if needed.  PAIN MEDICATION CAN BE VERY CONSTIPATING.  Take a stool softener or laxative such as senokot, pericolace, or milk of magnesia if needed.    Last pain medication given at Tylenol @ 1135 pm and Tramadol @ 1200 pm.    A follow-up appointment should be arranged with your doctor in 3-4 weeks; call to schedule.    You should CALL YOUR PHYSICIAN if you develop:  Fever greater than 101 degrees F.  Pain not relieved by medication, or persistent nausea or vomiting.  Excessive bleeding (blood soaking through dressing) or unexpected drainage from the wound.  Extreme redness or swelling around the incision site, drainage of pus or foul smelling drainage.  Inability to urinate or empty your bladder within 8 hours.  Problems with breathing or chest pain.    You should call 911 if you develop problems with breathing or chest pain.    If you are unable to contact your doctor or surgical center, you should go to the nearest emergency room or urgent care center.  Physician's telephone #: [phone]    MILD FLU-LIKE SYMPTOMS ARE NORMAL.  YOU MAY EXPERIENCE GENERALIZED MUSCLE ACHES, THROAT IRRITATION, HEADACHE AND/OR SOME NAUSEA.    If any questions arise, call  your doctor.  If your doctor is not available, please feel free to call the Surgical Center at [phone].  The Center is open Monday through Friday from 7AM to 7PM.      A registered nurse may call you a few days after your surgery to see how you are doing after your procedure.    You may also receive a survey in the mail within the next two weeks and we ask that you take a few moments to complete the survey and return it to us.  Our goal is to provide you with very good care and we value your comments.     Depression / Suicide Risk    As you are discharged from this [facility] beds at the [facility] facility, it is important to learn how to keep safe from harming yourself.    Recognize the warning signs:  Abrupt changes in personality, positive or negative- including increase in energy   Giving away possessions  Change in eating patterns- significant weight changes-  positive or negative  Change in sleeping patterns- unable to sleep or sleeping all the time   Unwillingness or inability to communicate  Depression  Unusual sadness, discouragement and loneliness  Talk of wanting to die  Neglect of personal appearance   Rebelliousness- reckless behavior  Withdrawal from people/activities they love  Confusion- inability to concentrate     If you or a loved one observes any of these behaviors or has concerns about self-harm, here's what you can do:  Talk about it- your feelings and reasons for harming yourself  Remove any means that you might use to hurt yourself (examples: pills, rope, extension cords, firearm)  Get professional help from the community (Mental Health, Substance Abuse, psychological counseling)  Do not be alone:Call your Safe Contact- someone whom you trust who will be there for you.  Call your local CRISIS HOTLINE 331-4474 or [phone]  Call your local Children's Mobile Crisis Response Team [place] (800) 231-0052 or www.Knewbi.com  Call the toll free National Suicide Prevention Hotlines   National  Suicide Prevention Lifeline 475-131-ZEDZ (1275)  [facility] 800-SUICIDE (755-5819)    I acknowledge receipt and understanding of these Home Care instructions.

## 2023-02-28 NOTE — OR NURSING
1520 Arrived from PACU AXO, Pt's VSS; denies N/V; states pain is at tolerable level. Dressing CDI to rt groin. Wife at the bedside.    1535 D/c orders received. IV dc'd. Pt changed into clothing with assistance. Discharge reviewed, Pt and family verbalized understanding and questions answered. Patient states ready to d/c home. Pt dc'd in w/c with CNA.

## 2023-02-28 NOTE — OR NURSING
Patient states his pain is now 2/10 and very tolerable.  VSS. B/p WNL.  Cont to monitor.  Patient to remain on bedrest for 2 more hours.

## 2023-02-28 NOTE — OR NURSING
Patient A+OX4. Denies pain or nausea.  VSS.  HTN hx Dr Evans aware and to order meds for b/p.  Right groin site with vascade and angioseal closures.  Covered with gauze dressing clean and dry.  Cont to monitor.  Wife updated

## 2023-02-28 NOTE — OR NURSING
Assume care for patient in pre-op. Patient allergies, surgical procedure and NPO status verified. PIV started and IVF infusing. Belongings secured in the locker. Call light within reach.  FSBS 132

## 2023-02-28 NOTE — OR NURSING
Patient c/o 4/10 pain in right groin and lower back.  Given tylenol as ordered.  If ineffective to give tramadol as ordered.

## 2023-02-28 NOTE — H&P
Physician H&P    Patient ID:  Zuhair Jj  [id number]  52 y.o. male  1970    History:  Primary Diagnosis: Symptomatic outflow tract PVCs    HPI:  For catheter ablation.  Recurrent episodes.  Fatigue.  Diabetes mellitus, hypertension, hyperlipidemia and obesity.  Continued symptoms with palpitations.  Normal echocardiogram in the past.  25% PVCs on last Holter monitor this year.  Beta-blockers without improvement.    Past Medical History:  has a past medical history of Allergy, Arrhythmia, Asthma, Chest pain, Close exposure to COVID-19 virus (08/06/2020), Diabetes ([facility]), Dyspnea on exertion (03/24/2020), Heart murmur, High cholesterol (02/24/2023), Hyperlipidemia, Hypertension, Overweight, Rheumatic fever with cardiac involvement, Sleep apnea (02/24/2023), and Sprain of medial collateral ligament of left knee (06/05/2019).    He has no past medical history of Addisons disease ([facility]), Adrenal disorder ([facility]), Anemia, Anxiety, Arthritis, Blood transfusion without reported diagnosis, Cancer ([facility]), Cataract, CHF (congestive heart failure) ([facility]), Clotting disorder ([facility]), COPD (chronic obstructive pulmonary disease) ([facility]), Cushings syndrome ([facility]), Depression, Diabetic neuropathy ([facility]), GERD (gastroesophageal reflux disease), Glaucoma, Goiter, Head ache, Heart attack ([facility]), HIV (human immunodeficiency virus infection) ([facility]), IBD (inflammatory bowel disease), Kidney disease, Meningitis, Migraine, Muscle disorder, Osteoporosis, Parathyroid disorder ([facility]), Pituitary disease ([facility]), Pulmonary emphysema ([facility]), Seizure ([facility]), Sickle cell disease ([facility]), Stroke ([facility]), Substance abuse ([facility]), Thyroid disease, Tuberculosis, or Urinary tract infection.  Past Surgical History:  has a past surgical history that includes other.  Past Social History:  reports that he has never smoked. He has never used smokeless tobacco. He reports that he does not drink alcohol and does not use drugs.  Past Family History:   Family History   Problem  "Relation Age of Onset    Cancer Father     Heart Failure Mother     Heart Disease Mother     Hypertension Mother     No Known Problems Child      Allergies: Pcn [penicillins]    Current Medications:  Prior to Admission medications    Medication Sig Start Date End Date Taking? Authorizing Provider   metFORMIN ER (GLUCOPHAGE XR) 750 MG TABLET SR 24 HR TAKE ONE TABLET BY MOUTH DAILY 1/27/23   Beth Webber P.A.-C.   ATORVASTATIN CALCIUM PO Take 40 mg by mouth.    Physician Outpatient   PROAIR  (90 Base) MCG/ACT Aero Soln inhalation aerosol Inhale 1-2 Puffs every four hours as needed for Shortness of Breath. 1/20/23   GERA Malhotra   QVAR REDIHALER 80 MCG/ACT inhaler Inhale 1 Puff 2 times a day. 1/20/23   GERA Malhotra   methylPREDNISolone (MEDROL DOSEPAK) 4 MG Tablet Therapy Pack Take as directed. 1/20/23   GERA Malhotra   atorvastatin (LIPITOR) 40 MG Tab Take 1 Tablet by mouth every evening. 2/14/22   Eduardo Evans M.D.   lisinopril-hydrochlorothiazide (PRINZIDE) 20-25 MG per tablet Take 2 Tablets by mouth every day. 12/27/21   Beth Webber P.A.-C.   aspirin 81 MG EC tablet Take 81 mg by mouth every day at 6 PM.    Physician Outpatient       Review of Systems:  ROS  BP (!) 143/89   Pulse 78   Temp 36.4 °C (97.6 °F) (Temporal)   Resp 18   Ht 1.676 m (5' 6\")   Wt 104 kg (229 lb 12.2 oz)   SpO2 93%     Physical Examination:  Physical Exam  Constitutional:       Appearance: Normal appearance. He is well-developed.   Eyes:      Conjunctiva/sclera: Conjunctivae normal.   Neck:      Thyroid: No thyroid mass.      Vascular: No JVD.   Cardiovascular:      Rate and Rhythm: Normal rate and regular rhythm. Occasional Extrasystoles are present.     Chest Wall: PMI is not displaced.      Pulses: Normal pulses.           Carotid pulses are 2+ on the right side and 2+ on the left side.       Radial pulses are 2+ on the right side and 2+ on the left side.        Femoral pulses " are 2+ on the right side and 2+ on the left side.       Dorsalis pedis pulses are 2+ on the right side and 2+ on the left side.      Heart sounds: S1 normal and S2 normal. No murmur heard.    No gallop.      Comments: No peripheral edema.  Pulmonary:      Effort: Pulmonary effort is normal.      Breath sounds: Normal breath sounds.   Abdominal:      General: There is no abdominal bruit.      Palpations: Abdomen is soft.      Tenderness: There is no abdominal tenderness.   Musculoskeletal:         General: Normal range of motion.      Thoracic back: No spasms or tenderness.   Skin:     Findings: No rash.      Nails: There is no clubbing.   Neurological:      Mental Status: He is alert and oriented to person, place, and time.       Impression:  1.  Outflow tract PVCs for catheter ablation,  The risks, benefits, and alternatives to ventricular tachycardia/PVC ablation were discussed in great detail. Specific risks mentioned include bleeding, infection, arteriovenous fistula related to sheath placement, cardiac perforation with possible tamponade requiring  pericardiocentesis or possible open heart surgery. In addition, pneumothorax and hemothorax were mentioned with right internal jugular venous access. I also discussed the remote possibility of permanent pacemaker placement due to to inadvertent AV block. Also mentioned were the possibilities of worsening congestive heart failure and procedural failure. Lastly, the risk of stroke was mentioned and also the risk of death.  The patient verbalized understanding of the potential complicatons and wishes to proceed with the procedure

## 2023-02-28 NOTE — OR NURSING
Phyllis THOMAS at bedside again to reassess patient.  Ok to discharge when meets criteria. Wife updated with patient status and plan.

## 2023-02-28 NOTE — OR NURSING
Dr Evans at bedside to reassess.  Aware of HTN.  Patient given Clonidine 0.1mg po for sy greater than 160.  Patient able to ask post op plan questions.  Cont to monitor

## 2023-03-03 ENCOUNTER — TELEPHONE (OUTPATIENT)
Dept: CARDIOLOGY | Facility: MEDICAL CENTER | Age: 53
End: 2023-03-03
Payer: COMMERCIAL

## 2023-03-03 DIAGNOSIS — I10 ESSENTIAL HYPERTENSION: ICD-10-CM

## 2023-03-03 RX ORDER — DILTIAZEM HYDROCHLORIDE 240 MG/1
240 CAPSULE, COATED, EXTENDED RELEASE ORAL DAILY
Qty: 30 CAPSULE | Refills: 11 | Status: SHIPPED | OUTPATIENT
Start: 2023-03-03 | End: 2023-03-23

## 2023-03-03 NOTE — TELEPHONE ENCOUNTER
Phone Number Called: [phone]    Call outcome: Spoke to patient regarding message below.    Message: Called to return Zuhair's phone call.     Patient reports BP was 161/94, hrt 75 this morning.     Patient denies symptoms. Patient reports that blood pressures elevated yesterday.     Patient reports that his has only started within the last couple days. Denies any extra stressful situations also. Patient reports he did have an ablation on 2/28/23.     Patient reports that yesterday blood pressure was 200/100 and felt like his heart was beating fast. Patient reports BP is normally 120/70.     ER precautions advised. Patient verbalized understanding.       RN to reach out to DS for further recommendations.     To: DS    Patient reports elevated blood pressures as high as 200/100 over last couple days. States he had an ablation 2/28/23. BP today was 161/94. Denies any other symptoms at this time. Please advise. Thank you

## 2023-03-03 NOTE — TELEPHONE ENCOUNTER
Phone Number Called: [phone]    Call outcome: Spoke to patient regarding message below.    Message: Called to inform patient of DS recommendations listed below:    Eduardo Evans M.D    Add cardizem cd  240 mg qd  Referal to vascular medicine      Patient verbalized understanding. Orders placed per DS recommendations.     ER precautions advise to patient. Patient verbalized understanding. No further questions at this time.

## 2023-03-03 NOTE — TELEPHONE ENCOUNTER
DS    Caller: Zuhair Jj    Reported Symptoms: Extremely high blood pressure, started after ablation procedure on 2023.    Recent Blood Pressure/Heart Rate Readin-02: 200/100  : 59/95, HR 80    Callback Number: [phone]    Thank you,  -Elise VELAZQUEZ

## 2023-03-06 RX ORDER — ATORVASTATIN CALCIUM 40 MG/1
TABLET, FILM COATED ORAL
Qty: 90 TABLET | Refills: 3 | Status: SHIPPED | OUTPATIENT
Start: 2023-03-06

## 2023-03-06 NOTE — TELEPHONE ENCOUNTER
Is the patient due for a refill? Yes    Was the patient seen the past year? Yes    Date of last office visit: 1/11/2023    Does the patient have an upcoming appointment?  Yes   If yes, When? 3/23/2023    Provider to refill:ROBERTO CARLOS    Does the patients insurance require a 100 day supply?  No

## 2023-03-07 ENCOUNTER — PATIENT MESSAGE (OUTPATIENT)
Dept: CARDIOLOGY | Facility: MEDICAL CENTER | Age: 53
End: 2023-03-07
Payer: COMMERCIAL

## 2023-03-10 NOTE — PATIENT COMMUNICATION
Phone Number Called: [phone]    Call outcome: Spoke to patient regarding message below.    Message: Called to inform patient that JS would like him to come into the office. Patient unable to come into the office at this time.     Patient reports he does not have any pain, swelling bruising, numbness, etc.     Patient stated he was unable to make it into the office today. RN offered patient to come in Monday, patient declined and stated he would call if anything changes.     ER precautions advised. Patient verbalized understanding.     RN to inform JS.     To: JS    Patient reports that he does not wish to come in at this time. Has f/u 3/23/23. Stated he would call if anything changes. ER precautions advised. Just FREDRICK

## 2023-03-10 NOTE — PATIENT COMMUNICATION
Phone Number Called: [phone]    Call outcome: Spoke to patient regarding message below.    Message: Called to return patient's phone call regarding Angioseal from ablation on 2/28/23.     Patient reports that he feels to seal has moved. Patient reports that the seal feels like its about 2 inches below his waist, swelling has decreased and bruising has decreased. Patient reports no symptoms at this time. Just wanted to make sure this was normal post procedure.     RN to reach out to JOSE M for further recommendations.     To: JOSE M    Patient reports that he feels like angioseal has moved about 2 inches below his waist, but is still in crease of groin. Had ablation with DS on 2/28/23. Denies swelling, bruising, numbness/tingling, etc. Just wanted to ensure that this is normal. Please advise. Thank you

## 2023-03-21 NOTE — PROGRESS NOTES
"Chief Complaint   Patient presents with    Premature Ventricular Contractions (PVCs)       Subjective     Zuhair Jj is a 52 y.o. male who presents today for procedural follow-up after PVC ablation 2/28/2023 by Dr. Eduardo Evans.    Procedure completed with ablation of PVCs originating from RVOT with past mapping below the valve in the free wall.  Post procedure with noted occasional PVCs on Isuprel.    Is followed by Dr. Evans for EP.  Past additional medical history significant for hypertension, MAGDALENA, type 2 diabetes.    Today in follow-up he reports that he has been feeling very well since his ablation.  Reports much improved energy and activity tolerance.  Does not notice significant amounts of PVCs.  Reports no chest pain, no shortness of breath, no significant dizziness presyncope or syncope.    He did have significantly elevated blood pressure post ablation for which she was prescribed diltiazem for he reports he took his blood pressure has come down significantly and he has stopped taking.  Reports his systolic blood pressure remains in the 130s to 140s.  He has an appointment with vascular medicine    Past Medical History:   Diagnosis Date    Allergy     Arrhythmia     Asthma     Chest pain     Close exposure to COVID-19 virus 08/06/2020    Diabetes (HCC)     Dyspnea on exertion 03/24/2020    Heart murmur     High cholesterol 02/24/2023    Hyperlipidemia     Hypertension     Overweight     Rheumatic fever with cardiac involvement     patient cant recall but thinks there was a \"tear\" in his heart    Sleep apnea 02/24/2023    Sprain of medial collateral ligament of left knee 06/05/2019     Past Surgical History:   Procedure Laterality Date    OTHER      jaw reconstruction     Family History   Problem Relation Age of Onset    Cancer Father     Heart Failure Mother     Heart Disease Mother     Hypertension Mother     No Known Problems Child      Social History     Socioeconomic History    Marital status: "      Spouse name: Not on file    Number of children: Not on file    Years of education: Not on file    Highest education level: Not on file   Occupational History    Occupation: Retired   Tobacco Use    Smoking status: Never    Smokeless tobacco: Never   Vaping Use    Vaping Use: Never used   Substance and Sexual Activity    Alcohol use: No    Drug use: No    Sexual activity: Yes     Partners: Female   Other Topics Concern    Not on file   Social History Narrative    Not on file     Social Determinants of Health     Financial Resource Strain: Not on file   Food Insecurity: Not on file   Transportation Needs: Not on file   Physical Activity: Not on file   Stress: Not on file   Social Connections: Not on file   Intimate Partner Violence: Not on file   Housing Stability: Not on file     Allergies   Allergen Reactions    Pcn [Penicillins] Anaphylaxis     Outpatient Encounter Medications as of 3/23/2023   Medication Sig Dispense Refill    lisinopril-hydrochlorothiazide (PRINZIDE) 20-25 MG per tablet TAKE TWO TABLETS BY MOUTH DAILY 60 Tablet 3    atorvastatin (LIPITOR) 40 MG Tab TAKE ONE TABLET BY MOUTH EVERY EVENING 90 Tablet 3    dilTIAZem CD (CARDIZEM CD) 240 MG CAPSULE SR 24 HR Take 1 Capsule by mouth every day. 30 Capsule 11    metFORMIN ER (GLUCOPHAGE XR) 750 MG TABLET SR 24 HR TAKE ONE TABLET BY MOUTH DAILY 90 Tablet 3    ATORVASTATIN CALCIUM PO Take 40 mg by mouth.      PROAIR  (90 Base) MCG/ACT Aero Soln inhalation aerosol Inhale 1-2 Puffs every four hours as needed for Shortness of Breath. 8.5 g 6    QVAR REDIHALER 80 MCG/ACT inhaler Inhale 1 Puff 2 times a day. 3 Each 3    methylPREDNISolone (MEDROL DOSEPAK) 4 MG Tablet Therapy Pack Take as directed. 21 Tablet 0    aspirin 81 MG EC tablet Take 81 mg by mouth every day at 6 PM.       No facility-administered encounter medications on file as of 3/23/2023.     Review of Systems   Constitutional:  Negative for chills, fever, malaise/fatigue and weight  "loss.   HENT:  Negative for congestion, nosebleeds and tinnitus.    Respiratory:  Negative for cough, hemoptysis, sputum production, shortness of breath and wheezing.    Cardiovascular:  Negative for chest pain, palpitations, orthopnea, leg swelling and PND.   Gastrointestinal:  Negative for blood in stool, heartburn, nausea and vomiting.   Genitourinary: Negative.    Musculoskeletal: Negative.    Skin:  Negative for rash.   Neurological:  Negative for dizziness, tingling, tremors, sensory change, speech change, focal weakness, loss of consciousness and headaches.   Psychiatric/Behavioral: Negative.              Objective     /84 (BP Location: Left arm, Patient Position: Sitting, BP Cuff Size: Adult)   Pulse 82   Resp 14   Ht 1.689 m (5' 6.5\")   Wt 103 kg (228 lb)   SpO2 95%   BMI 36.25 kg/m²     Physical Exam  Vitals reviewed.   Constitutional:       Appearance: Normal appearance. He is well-developed.   HENT:      Head: Normocephalic and atraumatic.      Mouth/Throat:      Mouth: Mucous membranes are moist.      Pharynx: Oropharynx is clear.   Eyes:      Extraocular Movements: Extraocular movements intact.      Conjunctiva/sclera: Conjunctivae normal.      Pupils: Pupils are equal, round, and reactive to light.   Neck:      Vascular: No JVD.   Cardiovascular:      Rate and Rhythm: Normal rate and regular rhythm.      Pulses: Normal pulses.      Heart sounds: Normal heart sounds. No murmur heard.    No friction rub. No gallop.      Comments: Right femoral vascular access site with small palpable nodule.  No ecchymosis no pain.  Pulmonary:      Effort: Pulmonary effort is normal.      Breath sounds: Normal breath sounds. No wheezing or rales.   Chest:      Chest wall: No tenderness.   Musculoskeletal:         General: Normal range of motion.      Cervical back: Normal range of motion and neck supple.      Right lower leg: No edema.      Left lower leg: No edema.   Skin:     General: Skin is warm and dry. "      Capillary Refill: Capillary refill takes 2 to 3 seconds.   Neurological:      Mental Status: He is alert and oriented to person, place, and time.   Psychiatric:         Mood and Affect: Mood normal.         Behavior: Behavior normal.         Thought Content: Thought content normal.         Judgment: Judgment normal.              Assessment & Plan     1. PVC's (premature ventricular contractions)  EKG      2. H/O cardiac radiofrequency ablation, 2/28/23. Dr VELASQUEZ Evans        3. Essential hypertension            Medical Decision Making: Today's Assessment/Status/Plan:   1.  PVCs  2.  Status post ablation  - Ablation 2/28/2023 of RVOT PVCs by Dr. Evans.    -Clinically is doing well post procedure.  Reports significant improvement in symptom and energy level.  - Twelve-lead EKG today sinus rhythm with few PVCs.  Discussed.    3.  Hypertension  - Blood pressures controlled today in office.  Discussed goal of blood pressure around 130/80 or less.  He does have appointment with vascular medicine coming up.  He will keep this.    Discussed return to clinic in 2 months for review, sooner if clinical condition changes.  Mr. Jj will contact our office via telephone call or [employer] message should questions or concerns arise.    PLEASE NOTE: This Note was created using voice recognition Software. I have made every reasonable attempt to correct obvious errors, but I expect that there are errors of grammar and possibly content that I did not discover before finalizing the note

## 2023-03-23 ENCOUNTER — OFFICE VISIT (OUTPATIENT)
Dept: CARDIOLOGY | Facility: MEDICAL CENTER | Age: 53
End: 2023-03-23
Payer: COMMERCIAL

## 2023-03-23 VITALS
DIASTOLIC BLOOD PRESSURE: 84 MMHG | OXYGEN SATURATION: 95 % | RESPIRATION RATE: 14 BRPM | WEIGHT: 228 LBS | BODY MASS INDEX: 35.79 KG/M2 | HEART RATE: 82 BPM | HEIGHT: 67 IN | SYSTOLIC BLOOD PRESSURE: 130 MMHG

## 2023-03-23 DIAGNOSIS — I10 ESSENTIAL HYPERTENSION: ICD-10-CM

## 2023-03-23 DIAGNOSIS — Z98.890 H/O CARDIAC RADIOFREQUENCY ABLATION: ICD-10-CM

## 2023-03-23 DIAGNOSIS — I49.3 PVC'S (PREMATURE VENTRICULAR CONTRACTIONS): ICD-10-CM

## 2023-03-23 PROCEDURE — 93000 ELECTROCARDIOGRAM COMPLETE: CPT | Performed by: NURSE PRACTITIONER

## 2023-03-23 PROCEDURE — 99213 OFFICE O/P EST LOW 20 MIN: CPT | Performed by: NURSE PRACTITIONER

## 2023-03-23 ASSESSMENT — ENCOUNTER SYMPTOMS
PALPITATIONS: 0
TINGLING: 0
COUGH: 0
HEARTBURN: 0
WHEEZING: 0
HEADACHES: 0
PSYCHIATRIC NEGATIVE: 1
HEMOPTYSIS: 0
SHORTNESS OF BREATH: 0
FEVER: 0
DIZZINESS: 0
NAUSEA: 0
BLOOD IN STOOL: 0
FOCAL WEAKNESS: 0
SENSORY CHANGE: 0
SPEECH CHANGE: 0
WEIGHT LOSS: 0
MUSCULOSKELETAL NEGATIVE: 1
SPUTUM PRODUCTION: 0
VOMITING: 0
CHILLS: 0
LOSS OF CONSCIOUSNESS: 0
TREMORS: 0
PND: 0
ORTHOPNEA: 0

## 2023-03-23 ASSESSMENT — FIBROSIS 4 INDEX: FIB4 SCORE: 2.69

## 2023-04-20 LAB — EKG IMPRESSION: NORMAL

## 2023-04-26 ENCOUNTER — OFFICE VISIT (OUTPATIENT)
Dept: SLEEP MEDICINE | Facility: MEDICAL CENTER | Age: 53
End: 2023-04-26
Attending: NURSE PRACTITIONER
Payer: COMMERCIAL

## 2023-04-26 VITALS
SYSTOLIC BLOOD PRESSURE: 130 MMHG | HEART RATE: 92 BPM | RESPIRATION RATE: 16 BRPM | DIASTOLIC BLOOD PRESSURE: 76 MMHG | BODY MASS INDEX: 36.57 KG/M2 | WEIGHT: 233 LBS | HEIGHT: 67 IN | OXYGEN SATURATION: 96 %

## 2023-04-26 DIAGNOSIS — R06.02 SHORTNESS OF BREATH: ICD-10-CM

## 2023-04-26 DIAGNOSIS — J45.21 MILD INTERMITTENT ASTHMA WITH ACUTE EXACERBATION: ICD-10-CM

## 2023-04-26 DIAGNOSIS — G47.33 OSA (OBSTRUCTIVE SLEEP APNEA): ICD-10-CM

## 2023-04-26 DIAGNOSIS — Z98.890 H/O CARDIAC RADIOFREQUENCY ABLATION: ICD-10-CM

## 2023-04-26 DIAGNOSIS — I10 ESSENTIAL HYPERTENSION: ICD-10-CM

## 2023-04-26 DIAGNOSIS — I49.3 PVC'S (PREMATURE VENTRICULAR CONTRACTIONS): ICD-10-CM

## 2023-04-26 PROCEDURE — 99214 OFFICE O/P EST MOD 30 MIN: CPT | Performed by: NURSE PRACTITIONER

## 2023-04-26 PROCEDURE — 99213 OFFICE O/P EST LOW 20 MIN: CPT | Performed by: NURSE PRACTITIONER

## 2023-04-26 ASSESSMENT — FIBROSIS 4 INDEX: FIB4 SCORE: 2.69

## 2023-04-26 NOTE — PROGRESS NOTES
Chief Complaint   Patient presents with    Follow-Up     MAGDALENA, Asthma: Last seen 1/20/23 WILLIAM Saldivar       HPI:      Mr. Jj is a 51 y/o male patient who is in today for MAGDALENA and asthma f/u.  Patient is retired from the [employer].  Patient lives in [facility].  Patient is a former [facility] patient. 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    Patient has a [employer]ics CPAP machine.  Patient has registered the device and received a replacement [employer]ics DreamStation 2 CPAP in January 2023. Compliance report from 3/27/2023 - 4/25/2023 was downloaded and reviewed with the patient which showed CPAP 13 cmH2O, 100% compliance, 6 hrs 25 min use, AHI of 2.0, average time in large leak per day is 51 minutes. He is tolerating the pressure and mask well. He goes to bed between 11 pm-12 AM and wakes up between 6-7 am. He denies morning headache or snoring. He sleeps much better when he uses the CPAP and uses it nightly.  He is not as tired during the day he usually tries to stay active by walking.     Since last office visit the patient's respiratory symptoms have significantly improved.  He did utilize the Medrol Dosepak that was prescribed by me, as well as the erythromycin that was prescribed by his PCP for an ear infection.  He does continue to have shortness of breath that is very intermittent and mild, with wheezing usually noted with exertion, coughing typically in the morning with clear phlegm that is easy to expel.  He continues to utilize his inhalers as directed.  Patient is amenable to completing pulmonary function testing but does note that he had issues with blacking out during previous spirometry testing.  Patient's respiratory symptoms are typically triggered more with cold weather.  Patient continues to follow-up with cardiology and underwent a cardiac ablation in February of this  year.      Pulmonary/Cardiac/Sleep Study History:   Chest X-ray from 1/20/23 indicated the heart is normal in size. No pulmonary infiltrates or consolidations are noted. No pleural effusions are appreciated.     Mutli Ox from 1/20/2023 indicated on room air at rest SPO2 was 95% and with exertion on room air SPO2 was 93%.     Spirometry from 12/28/22 indicated there is no significant obstructive ventilatory defect on spirometry. There is no significant bronchodilator response. Compared to prior testing in 2021, FEV1 has declined slightly from 2.91 L to 2.70 L, or 78% predicted. Flow volume loop is consistent with the above interpretation. FVC 3.36L or 77% predicted, FEV1 2.70L or 78% predicted, FEV1/FVC ratio 80%.     Overnight oximetry 1/10/2017 on his current pressures indicate a mean 02 saturation of 96.6%. He did have 1 episode of desaturation. However, it appears his mask may have been off at that time.      PSG titration from 12/10/07 indicated successful titration of nasal CPAP to an optimal pressure of 10 cm with an AHI of 1/h, and average O2 saturation of 94%.     PSG from 11/5/07 indicated an AHI of 15.5 with a low oxygen saturation.          ROS:  Constitutional: Denies fevers, chills, sweats, fatigue, weight loss  Eyes: Denies glasses, vision loss, pain, drainage, double vision  Ears/Nose/Mouth/Throat: Denies rhinitis, nasal congestion, ear ache, difficulty hearing, sore throat, persistent hoarseness, decayed teeth/toothache  Cardiovascular: Denies chest pain, tightness, palpitations, swelling in feet/legs, fainting, difficulty breathing when laying down  Respiratory: + mild intermittent shortness of breath with exertion, + cough in the AM, + clear sputum, + intermittent wheezing with exertion, denies painful breathing, coughing up blood  GI: Denies heartburn, difficulty swallowing, nausea, vomiting, abdominal pain, diarrhea, constiptation  : Denies frequent urination, painful urination  Integumentary:  "Denies rashes, lumps or color changes  Neurological: Denies frequent headaches, dizziness, weakness  Sleep: Denies daytime sleepiness, loud snoring, stops breathing during sleep, waking up gasping for air, insomnia, morning headaches        Past Medical History:   Diagnosis Date    Allergy     Arrhythmia     Asthma     Chest pain     Close exposure to COVID-19 virus 08/06/2020    Diabetes (HCC)     Dyspnea on exertion 03/24/2020    Heart murmur     High cholesterol 02/24/2023    Hyperlipidemia     Hypertension     Overweight     Rheumatic fever with cardiac involvement     patient cant recall but thinks there was a \"tear\" in his heart    Sleep apnea 02/24/2023    Sprain of medial collateral ligament of left knee 06/05/2019       Past Surgical History:   Procedure Laterality Date    OTHER      jaw reconstruction       Family History   Problem Relation Age of Onset    Cancer Father     Heart Failure Mother     Heart Disease Mother     Hypertension Mother     No Known Problems Child        Social History     Socioeconomic History    Marital status:      Spouse name: Not on file    Number of children: Not on file    Years of education: Not on file    Highest education level: Not on file   Occupational History    Occupation: Retired   Tobacco Use    Smoking status: Never    Smokeless tobacco: Never   Vaping Use    Vaping Use: Never used   Substance and Sexual Activity    Alcohol use: No    Drug use: No    Sexual activity: Yes     Partners: Female   Other Topics Concern    Not on file   Social History Narrative    Not on file     Social Determinants of Health     Financial Resource Strain: Not on file   Food Insecurity: Not on file   Transportation Needs: Not on file   Physical Activity: Not on file   Stress: Not on file   Social Connections: Not on file   Intimate Partner Violence: Not on file   Housing Stability: Not on file       Allergies as of 04/26/2023 - Reviewed 04/26/2023   Allergen Reaction Noted    Pcn " [penicillins] Anaphylaxis 08/30/2016        Vitals:  Vitals:    04/26/23 1249   BP: 130/76   Pulse: 92   Resp: 16   SpO2: 96%       Current medications as of today   Current Outpatient Medications   Medication Sig Dispense Refill    lisinopril-hydrochlorothiazide (PRINZIDE) 20-25 MG per tablet TAKE TWO TABLETS BY MOUTH DAILY 60 Tablet 3    atorvastatin (LIPITOR) 40 MG Tab TAKE ONE TABLET BY MOUTH EVERY EVENING 90 Tablet 3    metFORMIN ER (GLUCOPHAGE XR) 750 MG TABLET SR 24 HR TAKE ONE TABLET BY MOUTH DAILY 90 Tablet 3    ATORVASTATIN CALCIUM PO Take 40 mg by mouth.      PROAIR  (90 Base) MCG/ACT Aero Soln inhalation aerosol Inhale 1-2 Puffs every four hours as needed for Shortness of Breath. 8.5 g 6    QVAR REDIHALER 80 MCG/ACT inhaler Inhale 1 Puff 2 times a day. 3 Each 3    methylPREDNISolone (MEDROL DOSEPAK) 4 MG Tablet Therapy Pack Take as directed. 21 Tablet 0    aspirin 81 MG EC tablet Take 81 mg by mouth every day at 6 PM.       No current facility-administered medications for this visit.         Physical Exam:  Appearance: Well developed, obese, no acute distress  Eyes: PERRL, EOM intact, sclera white, conjunctiva moist  Ears: no lesions or deformities  Hearing: grossly intact  Nose: no lesions or deformities  Respiratory effort: no intercostal retractions or use of accessory muscles  Lung auscultation: no rales, rhonchi or wheezes bilaterally. Clear to ausculation bilaterally.   Heart auscultation: no murmur rub or gallop, RRate, but irregularly irregular rhythm noted  Extremities: no cyanosis or edema  Abdomen: soft, large  Gait and Station: normal  Digits and nails: no clubbing, cyanosis, petechiae or nodes.  Cranial nerves: grossly intact  Skin: no visible rashes, lesions or ulcers noted  Orientation: Oriented to time, person and place  Mood and affect: mood and affect appropriate, normal interaction with examiner  Judgement: Intact    Assessment:  1. MAGDALENA (obstructive sleep apnea)  DME Mask and  "Supplies      2. Mild intermittent asthma with acute exacerbation  PULMONARY FUNCTION TESTS -Test requested: Complete Pulmonary Function Test    PULMONARY FUNCTION TESTS -Test requested: Complete Pulmonary Function Test      3. Shortness of breath  PULMONARY FUNCTION TESTS -Test requested: Complete Pulmonary Function Test    PULMONARY FUNCTION TESTS -Test requested: Complete Pulmonary Function Test      4. PVC's (premature ventricular contractions)        5. H/O cardiac radiofrequency ablation, 2/28/23. Dr VELASQUEZ Evans        6. Essential hypertension        7. BMI 36.0-36.9,adult              Plan    1. Compliance report from 3/27/2023 - 4/25/2023 was downloaded and reviewed with the patient which showed CPAP 13 cmH2O, 100% compliance, 6 hrs 25 min use, AHI of 2.0, average time in large leak per day is 51 minutes. Patient is compliant and benefiting from CPAP therapy for management of MAGDLAENA. Advised patient to continue to use the CPAP every night for more than four hours for optimal health benefit.    *DME order ([employer]) for mask (FFM or MOC) and supplies was placed today. Continue to clean mask and supplies weekly with soap and water, and change supplies per insurance guidelines.   Patient was advised to gargle and spit twice, followed by gargle and swallow, followed by brushing the teeth and tongue thoroughly to avoid thrush.    *Sleep hygiene discussed. Recommend keeping a set sleep/wake schedule. Logging enough hours of sleep. Limiting/Avoiding naps. No caffeine after noon and no heavy meals in the evening.     2-3. Clinically stable.  Continue Qvar 80 mcg 1 puff twice daily, ProAir  mcg 1 to 2 puffs every 4-6 hours as needed shortness of breath.     *Update PFT in 3 months, order placed.  Patient has reported \"blacking out\" with spirometry testing.  Please take caution during testing.    4-6. Continue to f/u with cardiology Dr. Evans.  7. Encouraged a healthy diet and exercise to help with weight loss and " management. If your BMI is 25-29.9 you are overweight. If your BMI is 30 or greater you are obese. To lose weight eat less, move more, or both. Any diet that reduces caloric intake can help with weight loss. Extra weight may reduce your lifespan. Avoid dramatic unsustainable dietary changes that result in the yo-yo effect (down then back up.)  Usually small modifications in diet and exercise are easier to stick with.  8. Follow up with appropriate healthcare providers for all other medical problems.  9. F/u in 3 months for asthma with PFT, 1 year for MAGDALENA, sooner if needed.       JULIA Malhotra.      This dictation was created using voice recognition software. The accuracy of the dictation is limited to the abilities of the software. I expect there may be some errors of grammar and possibly content.

## 2023-04-28 ENCOUNTER — APPOINTMENT (OUTPATIENT)
Dept: VASCULAR LAB | Facility: MEDICAL CENTER | Age: 53
End: 2023-04-28
Payer: COMMERCIAL

## 2023-05-03 ENCOUNTER — TELEPHONE (OUTPATIENT)
Dept: VASCULAR LAB | Facility: MEDICAL CENTER | Age: 53
End: 2023-05-03
Payer: COMMERCIAL

## 2023-05-03 ENCOUNTER — TELEPHONE (OUTPATIENT)
Dept: CARDIOLOGY | Facility: MEDICAL CENTER | Age: 53
End: 2023-05-03

## 2023-05-03 NOTE — TELEPHONE ENCOUNTER
Received VM from pt asking to reschedule his initial visit w/ Dr Mcknight. States he accidentally cancelled it.    Forwarded to [facility]'s

## 2023-05-03 NOTE — TELEPHONE ENCOUNTER
SUSI    Caller: - Zuhair    Topic/issue: Zuhair had to cancel his appt with Dr. Mcknight, and is having issues getting rescheduled.  He would like a call back to discuss.     Callback Number: [phone]    Thank you,   Daphne IRENE

## 2023-05-05 ENCOUNTER — OFFICE VISIT (OUTPATIENT)
Dept: VASCULAR LAB | Facility: MEDICAL CENTER | Age: 53
End: 2023-05-05
Attending: FAMILY MEDICINE
Payer: COMMERCIAL

## 2023-05-05 VITALS
WEIGHT: 224.4 LBS | BODY MASS INDEX: 35.22 KG/M2 | DIASTOLIC BLOOD PRESSURE: 77 MMHG | HEIGHT: 67 IN | SYSTOLIC BLOOD PRESSURE: 134 MMHG | HEART RATE: 86 BPM

## 2023-05-05 DIAGNOSIS — I49.3 PVC'S (PREMATURE VENTRICULAR CONTRACTIONS): ICD-10-CM

## 2023-05-05 DIAGNOSIS — Z98.890 H/O CARDIAC RADIOFREQUENCY ABLATION: ICD-10-CM

## 2023-05-05 DIAGNOSIS — R73.03 PREDIABETES: ICD-10-CM

## 2023-05-05 DIAGNOSIS — E78.2 MIXED HYPERLIPIDEMIA: ICD-10-CM

## 2023-05-05 DIAGNOSIS — E66.01 CLASS 2 SEVERE OBESITY DUE TO EXCESS CALORIES WITH SERIOUS COMORBIDITY AND BODY MASS INDEX (BMI) OF 35.0 TO 35.9 IN ADULT (HCC): ICD-10-CM

## 2023-05-05 DIAGNOSIS — G47.33 OSA ON CPAP: ICD-10-CM

## 2023-05-05 DIAGNOSIS — R74.8 ELEVATED LIVER ENZYMES: ICD-10-CM

## 2023-05-05 DIAGNOSIS — I10 ESSENTIAL HYPERTENSION: ICD-10-CM

## 2023-05-05 PROBLEM — E66.812 CLASS 2 SEVERE OBESITY DUE TO EXCESS CALORIES WITH SERIOUS COMORBIDITY AND BODY MASS INDEX (BMI) OF 35.0 TO 35.9 IN ADULT (HCC): Status: ACTIVE | Noted: 2018-06-05

## 2023-05-05 PROCEDURE — 99212 OFFICE O/P EST SF 10 MIN: CPT

## 2023-05-05 PROCEDURE — 99204 OFFICE O/P NEW MOD 45 MIN: CPT | Performed by: FAMILY MEDICINE

## 2023-05-05 RX ORDER — TELMISARTAN 40 MG/1
40 TABLET ORAL DAILY
Qty: 30 TABLET | Refills: 3 | Status: SHIPPED
Start: 2023-05-05 | End: 2023-07-05

## 2023-05-05 RX ORDER — AMLODIPINE BESYLATE 5 MG/1
5 TABLET ORAL
Qty: 90 TABLET | Refills: 3 | Status: SHIPPED
Start: 2023-05-05 | End: 2023-07-05

## 2023-05-05 ASSESSMENT — ENCOUNTER SYMPTOMS
SPUTUM PRODUCTION: 0
DIZZINESS: 0
SHORTNESS OF BREATH: 0
WHEEZING: 0
TREMORS: 0
CHILLS: 0
ORTHOPNEA: 0
COUGH: 0
HEADACHES: 0
INSOMNIA: 0
HEMOPTYSIS: 0
FEVER: 0
PND: 0
CLAUDICATION: 0
PALPITATIONS: 0

## 2023-05-05 ASSESSMENT — FIBROSIS 4 INDEX: FIB4 SCORE: 2.69

## 2023-05-05 NOTE — PROGRESS NOTES
[facility] - INITIAL EVALUATION   05/05/23      Zuhair Jj is a male seen for eval/mgmt of HTN, est 5/2023   Zuhair Jj is initially referred by Eduardo Evans M.D.     Subjective      HTN:  Initial visit hx:  ongoing care with Dr. Evans (EP cardio) s/p ablation but having ongoing HTN despite current tx plan.  Most recently started in dilt 240mg ER.  Intermittent pattern   Home BP log: variable,  max 170s/90s,  140s/80s avg   Adherence to current HTN meds: compliant all of the time   Lifestyle factors:  Weight Change: [id number]   BMI Readings from Last 3 Encounters:   05/05/23 35.15 kg/m²   04/26/23 36.49 kg/m²   03/23/23 36.25 kg/m²      Diet pattern: limited eating out, grills/bakes meats, increase veg, yogurt  Daily salt intake estimate:  Low     EtOH: No  Exercise habits:  4-6mi/day most days per weeks  Perceived barriers to care: none   Pertinent HTN hx (reviewed at initial visit):   Age at HTN dx: 31yo, but progressively worsening over last few years   Past HTN medications: as noted   HTN crises:  No prior hospitalization or crises   Interfering substances/contributing factors:  None    Hyperlipidemia:  Stable, no current concerns  Current treatment: lifestyle changes  and High intensity  atorva     Antiplatelet/anticoagulation: Yes, Details: ASA 81mg , no bleeding noted     PreDM:  Lab Results   Component Value Date    HBA1C 5.4 01/06/2023   no prior dx of T2D or meds, no sx     CKD: No     Sleeping disorder/MAGDALENA: Yes, Details: dx, has CPAP  Good adherence and effectiveness      Hypothyroidism: No     Patient Active Problem List    Diagnosis Date Noted    Elevated liver enzymes 05/05/2023    H/O cardiac radiofrequency ablation, 2/28/23. Dr VELASQUEZ Evans 03/23/2023    Elevated LFTs 01/13/2023    Obesity (BMI 30-39.9) 01/10/2023    Otitis media follow-up, not resolved, right 12/30/2022    Trigger little finger of right hand 12/27/2021    PVC's (premature ventricular contractions) 03/24/2020     Chronic left shoulder pain 01/27/2020    Mixed hyperlipidemia 01/27/2020    Type 2 diabetes mellitus with complication, without long-term current use of insulin ([facility]) 11/21/2018    Class 2 severe obesity due to excess calories with serious comorbidity and body mass index (BMI) of 35.0 to 35.9 in adult ([facility]) 06/05/2018    Elevated cortisol level 06/05/2018    Mild intermittent asthma without complication 10/06/2016    MAGDALENA (obstructive sleep apnea) 10/06/2016    Essential hypertension 10/06/2016     Past Surgical History:   Procedure Laterality Date    OTHER      jaw reconstruction       Current Outpatient Medications:     VITAMIN D PO, Take  by mouth., Taking    amLODIPine, 5 mg, Oral, QHS    telmisartan, 40 mg, Oral, DAILY    metFORMIN ER, TAKE ONE TABLET BY MOUTH DAILY, Taking    ProAir HFA, 1-2 Puff, Inhalation, Q4HRS PRN, PRN    Qvar RediHaler, 1 Puff, Inhalation, BID, PRN    aspirin, 81 mg, Oral, DAILY AT 1800, Taking    atorvastatin, TAKE ONE TABLET BY MOUTH EVERY EVENING (Patient not taking: Reported on 5/5/2023), Not Taking   Pcn [penicillins]    Family History   Problem Relation Age of Onset    Heart Failure Mother 68        cause of death    Heart Disease Mother     Hypertension Mother     Kidney cancer Father 52        cause of death    No Known Problems Brother     No Known Problems Brother     Cerebral aneurysm Maternal Grandfather 54        cause of death    Hypertension Maternal Grandfather     No Known Problems Child      Social History     Tobacco Use    Smoking status: Never    Smokeless tobacco: Never   Vaping Use    Vaping Use: Never used   Substance Use Topics    Alcohol use: No    Drug use: No      Review of Systems   Constitutional:  Negative for chills, fever and malaise/fatigue.   Respiratory:  Negative for cough, hemoptysis, sputum production, shortness of breath and wheezing.    Cardiovascular:  Negative for chest pain, palpitations, orthopnea, claudication, leg swelling and PND.  "  Neurological:  Negative for dizziness, tremors and headaches.   Psychiatric/Behavioral:  The patient does not have insomnia.          Objective    Vitals:    05/05/23 1540 05/05/23 1546   BP: (!) 152/94 134/77   BP Location: Left arm Left arm   Patient Position: Sitting Sitting   BP Cuff Size: Adult Large adult   Pulse: 80 86   Weight: 102 kg (224 lb 6.4 oz)    Height: 1.702 m (5' 7\")      Body mass index is 35.15 kg/m².  BP Readings from Last 5 Encounters:   05/05/23 134/77   04/26/23 130/76   03/23/23 130/84   02/28/23 (!) 140/76   01/20/23 122/78      Physical Exam  Vitals reviewed.   Constitutional:       General: He is not in acute distress.     Appearance: He is well-developed. He is not diaphoretic.   HENT:      Head: Normocephalic and atraumatic.   Neck:      Thyroid: No thyromegaly.      Vascular: No JVD.   Cardiovascular:      Rate and Rhythm: Normal rate and regular rhythm.      Pulses: Normal pulses.      Heart sounds: No murmur heard.  Pulmonary:      Effort: No respiratory distress.      Breath sounds: Normal breath sounds. No wheezing or rales.   Musculoskeletal:         General: No swelling or tenderness.   Neurological:      General: No focal deficit present.      Mental Status: He is oriented to person, place, and time.      Cranial Nerves: No cranial nerve deficit.      Coordination: Coordination normal.   Psychiatric:         Mood and Affect: Mood normal.         Behavior: Behavior normal.      Accessory Clinical Findings:     Lab Results   Component Value Date    CHOLSTRLTOT 190 01/06/2023    LDL 87 01/06/2023    HDL 74 01/06/2023    TRIGLYCERIDE 145 01/06/2023      No results found for: LIPOPROTA   No results found for: APOB    Lab Results   Component Value Date    PROTHROMBTM 13.5 02/24/2023    INR 1.04 02/24/2023       Lab Results   Component Value Date    HBA1C 5.4 01/06/2023      Lab Results   Component Value Date    SODIUM 134 (L) 02/24/2023    POTASSIUM 3.7 02/24/2023    CHLORIDE 95 (L) " 2023    CO2 23 2023    GLUCOSE 102 (H) 2023    BUN 8 2023    CREATININE 0.61 2023          Lab Results   Component Value Date    URCREAT 143.20 2023    MICROALBUR <1.2 2023    MALBCRT see below 2023     Lab Results   Component Value Date/Time    MALBCRT see below 2023 07:55 AM    MICROALBUR <1.2 2023 07:55 AM      VASCULAR IMAGING:     Results for orders placed or performed in visit on 23   EKG   Result Value Ref Range    Report       [facility]    Test Date:  2023  Pt Name:    JERI SIMMONS                Department: [facility]  MRN:        [id number]                      Room:  Gender:     Male                         Technician: YM  :        1970                   Requested By:JALEN WOLF  Order #:    [id number]                    Reading MD: Eduardo Evans MD    Measurements  Intervals                                Axis  Rate:       79                           P:          49  OH:         171                          QRS:        75  QRSD:       81                           T:          74  QT:         367  QTc:        423    Interpretive Statements  Sinus rhythm  Ventricular premature complex  Abnormal R-wave progression, early transition  Compared to ECG 2023 11:16:06  Sinus bradycardia no longer present  Electronically Signed On 2023 11:49:04 PDT by Eduardo Evans MD       NM stress tst 3/2020    NUCLEAR IMAGING INTERPRETATION   Negative ECG for signs of ischemia at 8.3 METS of exercise.   Baseline hypertensive with normal blood pressure response to exercise.   Normal heart rate response to exercise.   No perfusion evidence of significant jeopardized viable myocardium or prior    myocardial infarction.   Normal left ventricular size, ejection fraction, and wall motion.   Frequent ventricular ectopy.   Little treadmill score +7.5, low risk.    ECG INTERPRETATION   No ischemic changes with stress compared to  baseline ECG. Frequent premature    ventricular contractions (PVC) and single vetricular couplet with stress.    Frequent PVC with rest. PVC burden somewhat increased with stress.     Cardiac monitor 1/2023   Interpretive Statements   *   Monitoring started at 5:06 PM and continued for 24 hours. The overall   rhythm was Sinus. The average heart rate was 90   BPM. The minimum heart rate was 64 BPM, occurring at 9:35:58 AM. The maximum   heart rate was 125 BPM, occurring at   6:52:40 PM. The longest R-R interval was 1.4 seconds occurring at 4:01:28   AM.      Ventricular ectopic activity consisted of 9 triplets, 472 couplets, 44324   single, unifocal PVCs, 123 single endiastolic beats,   73796 in bigeminy, 5748 in trigeminy.   *   The patient's rhythm included 2 hours 20 minutes 17 seconds of   tachycardia. The fastest single episode of tachycardia   occurred at 6:51:29 PM, lasting 1 minute 20 seconds, with maximum heart rate   of 125 BPM.   *   Supraventricular ectopic activity consisted of 9 late beats, 4 single   PACs.   *   Diary Entries- No symptoms listed in diary. Event button used once during   monitoring, corresponding to a heart rate of 89   BPM. Frequent Ventricular ectopy noted.    EP ablation 2/28/23 (Dr. Evans)   1. Baseline rhythm: SR with RVOT PVCs  2. Baseline interval: RR interval 773 msec, AK interval 150 msec QRS duration 90 msec QT interval 325 msec   3. Pace mapping of RVOT with best map below the valve in the free wall.  4. Ablation this location with resolution of PVCs.  Just occasional PVCs seen on Isuprel washout.             MEDICAL DECISION-MAKING:  TODAY'S ASSESSMENT / STATUS / PLAN:  1. Essential hypertension  ALDOSTERONE    CBC WITH DIFFERENTIAL    Comp Metabolic Panel    METANEPHRINES PLASMA    MICROALBUMIN CREAT RATIO URINE    RENIN ACTIVITY    TSH WITH REFLEX TO FT4    Lipid Profile    US-RENAL ARTERY DUPLEX COMP    Referral to 24-Hour Blood Pressure Monitoring    amLODIPine (NORVASC)  5 MG Tab    telmisartan (MICARDIS) 40 MG Tab      2. MAGDALENA on CPAP        3. PVC's (premature ventricular contractions)        4. Mixed hyperlipidemia        5. H/O cardiac radiofrequency ablation, 2/28/23. Dr VELASQUEZ Evans        6. Elevated liver enzymes        7. Class 2 severe obesity due to excess calories with serious comorbidity and body mass index (BMI) of 35.0 to 35.9 in adult (HCC)        8. Prediabetes             Patient Type: Primary Prevention    1. BLOOD PRESSURE CONTROL   Qualifies as Resistant HTN (RH):  No, not on optimized, max-dosed or max-tolerated meds from 3 classes   Office BP Goal ACC/AHA (2017) goal <130/80  Home BP at goal:  no  Office BP at goal:  no, white coat effect, slight high SBP only   24h ABPM:  not ordered to date   Device candidate? Not available at this time   Contributing factors: obesity, early onset    Plan:   Monitoring:   - start/continue home BP monitoring, reviewed correct technique:  Yes   - order 24h ABPM: YES  - monitor lytes/gfr routinely   - advised that stabilizing BP may require multiple med changes and close monitoring over the next several months, reviewed that initially there will be additional imaging and labs and the possibility of adverse drug rxn's and variability of his BP and HR until we have things stabilized.  Advised to contact our office as needed for questions or concerns.      2. WORK UP FOR SECONDARY CAUSES OF RH:  Obstructive Sleep Apnea: MAGDALENA on CPAP  Strongly encouraged CPAP adherence to reduce RV strain, improve overall fatigue symptoms, and improve BP in both the short- and long-term as per HIPARCO (2013) and HIPARCO-2 (2021) studies.   - continue CPAP, defer mgmt to sleep MD     Renal parenchymal disease: Excluded  Renovascular HTN:  pending  Primary Aldosteronism: Not Yet Assessed  Thyroid Function: Not Yet Assessed  Pheochromocytoma: Not Yet Assessed   Cushing's:   Excluded   Coarctation of Aorta:  unlikely   Other: none  "identified    Recommendations At This Visit: as noted in orders         3. MEDICATION USE / ADHERENCE:   Assessment: Complete  Recommendations: Instructed to Continue Taking All Medications As Prescribed         4. HTN-MEDIATED END-ORGAN DAMAGE:  Left Ventricular Hypertrophy: Absent on  ECG Date: 2023  Urine Albuminuria: Not Yet Assessed on Date: ordered   Renal Function: G1  Ophthalmologic: Absent per patient report and/or eye specialist   Established Cardiovascular Disease: no ASCVD events     # PVCs , s/p ablation - stable, continue with cardiology     5. LIFESTYLE INTERVENTIONS:    SMOKING:    reports that he has never smoked. He has never used smokeless tobacco.   - continued complete avoidance of all tobacco products     PHYSICAL ACTIVITY: continue healthy activity to improve CV fitness.  In general, targeting >150min/week of moderate-level activity or as much as tolerated in light of functional status and co-morbidities     WEIGHT MANAGEMENT AND NUTRITION: DASH style dietary approach , Focus on reduced sodium to <2,000mg per day , Weight reduction approach - reduce caloric intake by 300kcal/day to achieve 1-2lb weight loss per week , and Provide specific dietary approach handouts     6. STANDARD HTN PHARMACOTHERAPY:  - stop lisinopril/hctz, start telmisartan 40mg daily   - start amlodipine 5mg daily   - add longer acting thiazide next     7. ADDITIONAL AGENTS   None     LIPID MANAGEMENT:   Qualifies for Statin Therapy Based on 2018 ACC/AHA Guidelines: yes, Primary Prevention - 40-74yo, LDLc >70, <190 w/o DM  The 10-year ASCVD risk score (Chloe SAUER, et al., 2019) is: 8.1%7.5 - <20% \"intermediate risk\"   Major ASCVD events: None    High-risk conditions: N/A  Risk-enhancers: N/A  Currently on Statin: Yes  Tx goals: LDL-C <100 (if HTG, consider apoB <90, non-HDL-C <130)  At goal? yes  Plan:   - reinforced ongoing TLC measures as noted   - monitor labs   Meds:   - continue atorva 40mg daily     GLYCEMIA " MANAGEMENT:  Prediabetic  Goal A1c < 7.0  Lab Results   Component Value Date    HBA1C 5.4 01/06/2023      Lab Results   Component Value Date/Time    MALBCRT see below 01/06/2023 07:55 AM    MICROALBUR <1.2 01/06/2023 07:55 AM     Plan:  - continue current medication plan   - recommmend for routine care with PCP (or endocrine) to include regular A1c monitoring, annual albumin/creatinine ratio (ACR), annual diabetic retinopathy screening, foot exams, annual flu vaccine, and updates to pneumonia vaccines as appropriate      ANTIPLATELET/ANTICOAGULANT THERAPY:  continue ASA 81mg daily, no hx of bleeding     OTHER ISSUES:  none     Studies Ordered At This Visit: OBED duplex, 24h ABPM    Blood Work to Be Obtained Prior to Next Visit: as noted below   Disposition: RTC in 2 months virtual       Agustin Mcknight M.D.  [facility]   [facility]   [phone]

## 2023-05-09 ENCOUNTER — APPOINTMENT (OUTPATIENT)
Dept: MEDICAL GROUP | Facility: CLINIC | Age: 53
End: 2023-05-09
Payer: COMMERCIAL

## 2023-05-16 ENCOUNTER — NON-PROVIDER VISIT (OUTPATIENT)
Dept: VASCULAR LAB | Facility: MEDICAL CENTER | Age: 53
End: 2023-05-16
Attending: FAMILY MEDICINE
Payer: COMMERCIAL

## 2023-05-16 PROCEDURE — 93786 AMBL BP MNTR W/SW REC ONLY: CPT

## 2023-05-16 PROCEDURE — 93788 AMBL BP MNTR W/SW A/R: CPT

## 2023-05-16 PROCEDURE — 93790 AMBL BP MNTR W/SW I&R: CPT | Performed by: INTERNAL MEDICINE

## 2023-05-16 NOTE — NON-PROVIDER
ABPM placed at 1:15 PM . Pt to return monitor tomorrow after 1:15 PM to ensure a 24 hr reading.

## 2023-05-17 ENCOUNTER — OFFICE VISIT (OUTPATIENT)
Dept: CARDIOLOGY | Facility: MEDICAL CENTER | Age: 53
End: 2023-05-17
Attending: NURSE PRACTITIONER
Payer: COMMERCIAL

## 2023-05-17 VITALS
SYSTOLIC BLOOD PRESSURE: 130 MMHG | HEIGHT: 67 IN | DIASTOLIC BLOOD PRESSURE: 78 MMHG | OXYGEN SATURATION: 95 % | WEIGHT: 230 LBS | RESPIRATION RATE: 16 BRPM | BODY MASS INDEX: 36.1 KG/M2 | HEART RATE: 86 BPM

## 2023-05-17 DIAGNOSIS — I49.3 PVC'S (PREMATURE VENTRICULAR CONTRACTIONS): ICD-10-CM

## 2023-05-17 DIAGNOSIS — E78.2 MIXED HYPERLIPIDEMIA: ICD-10-CM

## 2023-05-17 DIAGNOSIS — I10 ESSENTIAL HYPERTENSION: ICD-10-CM

## 2023-05-17 DIAGNOSIS — Z98.890 H/O CARDIAC RADIOFREQUENCY ABLATION: ICD-10-CM

## 2023-05-17 PROCEDURE — 3075F SYST BP GE 130 - 139MM HG: CPT | Performed by: NURSE PRACTITIONER

## 2023-05-17 PROCEDURE — 3078F DIAST BP <80 MM HG: CPT | Performed by: NURSE PRACTITIONER

## 2023-05-17 PROCEDURE — 93010 ELECTROCARDIOGRAM REPORT: CPT | Performed by: NURSE PRACTITIONER

## 2023-05-17 PROCEDURE — 93005 ELECTROCARDIOGRAM TRACING: CPT | Performed by: NURSE PRACTITIONER

## 2023-05-17 PROCEDURE — 99213 OFFICE O/P EST LOW 20 MIN: CPT | Mod: 25 | Performed by: NURSE PRACTITIONER

## 2023-05-17 PROCEDURE — 99212 OFFICE O/P EST SF 10 MIN: CPT | Performed by: NURSE PRACTITIONER

## 2023-05-17 ASSESSMENT — ENCOUNTER SYMPTOMS
NAUSEA: 0
SENSORY CHANGE: 0
PALPITATIONS: 0
TINGLING: 0
BLURRED VISION: 0
SHORTNESS OF BREATH: 0
FEVER: 0
ORTHOPNEA: 0
WHEEZING: 0
CHILLS: 0
WEIGHT LOSS: 0
DIZZINESS: 0
HEADACHES: 0
VOMITING: 0
SPUTUM PRODUCTION: 0
PSYCHIATRIC NEGATIVE: 1
TREMORS: 0
HEMOPTYSIS: 0
FOCAL WEAKNESS: 0
LOSS OF CONSCIOUSNESS: 0
PND: 0
COUGH: 0
HEARTBURN: 0
DOUBLE VISION: 0
MUSCULOSKELETAL NEGATIVE: 1
SPEECH CHANGE: 0

## 2023-05-17 ASSESSMENT — FIBROSIS 4 INDEX: FIB4 SCORE: 2.26

## 2023-05-17 NOTE — PROGRESS NOTES
"Chief Complaint   Patient presents with    Premature Ventricular Contractions (PVCs)       Subjective     Zuhair Jj is a 52 y.o. male who presents today for follow-up.    He underwent PVC ablation with Dr. Evans 2/28/2023.  Procedure completed with ablation of PVCs originating from RVOT with best mapping below the valve in the free wall.  Postprocedure noted to have occasional PVCs with use of Isopril.    He is followed by Dr. Evans for EP.  Additional medical history significant for hypertension, MAGDALENA and type 2 diabetes.  He additionally is seeing vascular medicine clinic, Dr. Agustin Mcknight for hypertension management.    Today in follow-up reports that he has done well from a cardiac arrhythmia standpoint.  No symptoms or indications of recurrent PVCs.  Reports that he continues to feel improved with reduction of PVCs.    Past Medical History:   Diagnosis Date    Allergy     Arrhythmia     Asthma     Chest pain     Close exposure to COVID-19 virus 08/06/2020    Diabetes (HCC)     Dyspnea on exertion 03/24/2020    Heart murmur     High cholesterol 02/24/2023    Hyperlipidemia     Hypertension     Overweight     Rheumatic fever with cardiac involvement     patient cant recall but thinks there was a \"tear\" in his heart    Sleep apnea 02/24/2023    Sprain of medial collateral ligament of left knee 06/05/2019     Past Surgical History:   Procedure Laterality Date    OTHER      jaw reconstruction     Family History   Problem Relation Age of Onset    Heart Failure Mother 68        cause of death    Heart Disease Mother     Hypertension Mother     Kidney cancer Father 52        cause of death    No Known Problems Brother     No Known Problems Brother     Cerebral aneurysm Maternal Grandfather 54        cause of death    Hypertension Maternal Grandfather     No Known Problems Child      Social History     Socioeconomic History    Marital status:      Spouse name: Not on file    Number of children: Not on file "    Years of education: Not on file    Highest education level: Not on file   Occupational History    Occupation: Retired   Tobacco Use    Smoking status: Never    Smokeless tobacco: Never   Vaping Use    Vaping Use: Never used   Substance and Sexual Activity    Alcohol use: No    Drug use: No    Sexual activity: Yes     Partners: Female   Other Topics Concern    Not on file   Social History Narrative    Not on file     Social Determinants of Health     Financial Resource Strain: Not on file   Food Insecurity: Not on file   Transportation Needs: Not on file   Physical Activity: Not on file   Stress: Not on file   Social Connections: Not on file   Intimate Partner Violence: Not on file   Housing Stability: Not on file     Allergies   Allergen Reactions    Pcn [Penicillins] Anaphylaxis     Outpatient Encounter Medications as of 5/17/2023   Medication Sig Dispense Refill    VITAMIN D PO Take  by mouth.      amLODIPine (NORVASC) 5 MG Tab Take 1 Tablet by mouth at bedtime. 90 Tablet 3    telmisartan (MICARDIS) 40 MG Tab Take 1 Tablet by mouth every day. 30 Tablet 3    atorvastatin (LIPITOR) 40 MG Tab TAKE ONE TABLET BY MOUTH EVERY EVENING (Patient not taking: Reported on 5/5/2023) 90 Tablet 3    metFORMIN ER (GLUCOPHAGE XR) 750 MG TABLET SR 24 HR TAKE ONE TABLET BY MOUTH DAILY 90 Tablet 3    PROAIR  (90 Base) MCG/ACT Aero Soln inhalation aerosol Inhale 1-2 Puffs every four hours as needed for Shortness of Breath. 8.5 g 6    QVAR REDIHALER 80 MCG/ACT inhaler Inhale 1 Puff 2 times a day. 3 Each 3    aspirin 81 MG EC tablet Take 81 mg by mouth every day at 6 PM.       No facility-administered encounter medications on file as of 5/17/2023.     Review of Systems   Constitutional:  Negative for chills, fever, malaise/fatigue and weight loss.   HENT:  Negative for tinnitus.    Eyes:  Negative for blurred vision and double vision.   Respiratory:  Negative for cough, hemoptysis, sputum production, shortness of breath and  "wheezing.    Cardiovascular:  Negative for chest pain, palpitations, orthopnea, leg swelling and PND.   Gastrointestinal:  Negative for heartburn, nausea and vomiting.   Genitourinary: Negative.    Musculoskeletal: Negative.    Neurological:  Negative for dizziness, tingling, tremors, sensory change, speech change, focal weakness, loss of consciousness and headaches.   Psychiatric/Behavioral: Negative.                Objective     /78 (BP Location: Left arm, Patient Position: Sitting, BP Cuff Size: Adult)   Pulse 86   Resp 16   Ht 1.702 m (5' 7\")   Wt 104 kg (230 lb)   SpO2 95%   BMI 36.02 kg/m²     Physical Exam  Vitals reviewed.   Constitutional:       Appearance: Normal appearance. He is well-developed.   HENT:      Head: Normocephalic and atraumatic.      Mouth/Throat:      Mouth: Mucous membranes are moist.      Pharynx: Oropharynx is clear.   Eyes:      Extraocular Movements: Extraocular movements intact.      Conjunctiva/sclera: Conjunctivae normal.      Pupils: Pupils are equal, round, and reactive to light.   Neck:      Vascular: No JVD.   Cardiovascular:      Rate and Rhythm: Normal rate and regular rhythm.      Pulses: Normal pulses.      Heart sounds: Normal heart sounds. No murmur heard.     No friction rub. No gallop.   Pulmonary:      Effort: Pulmonary effort is normal.      Breath sounds: Normal breath sounds. No wheezing or rales.   Chest:      Chest wall: No tenderness.   Musculoskeletal:         General: Normal range of motion.      Cervical back: Normal range of motion and neck supple.      Right lower leg: No edema.      Left lower leg: No edema.   Skin:     General: Skin is warm and dry.      Capillary Refill: Capillary refill takes 2 to 3 seconds.   Neurological:      Mental Status: He is alert and oriented to person, place, and time.   Psychiatric:         Mood and Affect: Mood normal.         Behavior: Behavior normal.         Thought Content: Thought content normal.         " Judgment: Judgment normal.                Assessment & Plan     1. PVC's (premature ventricular contractions)  EKG      2. H/O cardiac radiofrequency ablation, 2/28/23. Dr VELASQUEZ Evans        3. Mixed hyperlipidemia        4. Essential hypertension            Medical Decision Making: Today's Assessment/Status/Plan:   1.  PVCs  2.  Status post ablation  - Status post PVC ablation with Dr. Evans 2/28/2023 with PVCs from the RVOT.  -Twelve-lead EKG today sinus rhythm with isolated PVCs.  Per his symptom report minimal symptoms of PVCs and continues to do well.  -He is not on antiarrhythmics or beta-blockers.    3.  Hyperlipidemia  -Lipid panel this year at goal.  Continue atorvastatin 40 mg.    4.  Hypertension  - Has been working with vascular medicine for hypertension management.  Just completed ambulatory 24-hour monitor we will follow-up with vascular in regards to this.  - His blood pressure today is controlled at 130/78 on current medication regimen.    Return to clinic in 6 months for review, sooner if clinical condition changes.    PLEASE NOTE: This Note was created using voice recognition Software. I have made every reasonable attempt to correct obvious errors, but I expect that there are errors of grammar and possibly content that I did not discover before finalizing the note

## 2023-05-22 NOTE — PROGRESS NOTES
Ambulatory blood pressure monitor results reviewed  Full report under media tab  Reasonable data acquisition    Mean daytime: 124/76 consistent with well-controlled out of office blood pressure    Nocturnal dip: Well-maintained with 1 episode of relative hypotension in the early a.m. hours.  Unclear whether or not this represents artifact    Clinical correlation needed.  We will discuss with patient at follow-up visit    Michael Bloch, MD  [facility]

## 2023-05-23 ENCOUNTER — DOCUMENTATION (OUTPATIENT)
Dept: VASCULAR LAB | Facility: MEDICAL CENTER | Age: 53
End: 2023-05-23
Payer: COMMERCIAL

## 2023-05-23 NOTE — PROGRESS NOTES
Pt called in regards to ABPM. Per Dr. Bloch , pt was advised the following : I reviewed his blood pressure monitor and it looks pretty good.  Daytime average is 124/76.  He should stay on the same medications until he follows up with Dr. Mcknight.      Verbal understanding provided   Next f/u with Vm on 07/05

## 2023-05-25 LAB — EKG IMPRESSION: NORMAL

## 2023-05-31 ENCOUNTER — HOSPITAL ENCOUNTER (OUTPATIENT)
Dept: RADIOLOGY | Facility: MEDICAL CENTER | Age: 53
End: 2023-05-31
Attending: FAMILY MEDICINE
Payer: COMMERCIAL

## 2023-05-31 DIAGNOSIS — I10 ESSENTIAL HYPERTENSION: ICD-10-CM

## 2023-05-31 PROCEDURE — 93975 VASCULAR STUDY: CPT

## 2023-07-05 ENCOUNTER — TELEMEDICINE (OUTPATIENT)
Dept: VASCULAR LAB | Facility: MEDICAL CENTER | Age: 53
End: 2023-07-05
Attending: NURSE PRACTITIONER
Payer: COMMERCIAL

## 2023-07-05 DIAGNOSIS — I10 ESSENTIAL HYPERTENSION: ICD-10-CM

## 2023-07-05 DIAGNOSIS — E78.2 MIXED HYPERLIPIDEMIA: ICD-10-CM

## 2023-07-05 DIAGNOSIS — E11.8 TYPE 2 DIABETES MELLITUS WITH COMPLICATION, WITHOUT LONG-TERM CURRENT USE OF INSULIN (HCC): ICD-10-CM

## 2023-07-05 PROCEDURE — 99214 OFFICE O/P EST MOD 30 MIN: CPT | Mod: 95 | Performed by: NURSE PRACTITIONER

## 2023-07-05 RX ORDER — LISINOPRIL AND HYDROCHLOROTHIAZIDE 25; 20 MG/1; MG/1
1 TABLET ORAL DAILY
Qty: 90 TABLET | Refills: 3 | Status: ON HOLD
Start: 2023-07-05 | End: 2023-08-25

## 2023-07-05 ASSESSMENT — ENCOUNTER SYMPTOMS
WHEEZING: 0
PND: 0
FEVER: 0
SHORTNESS OF BREATH: 0
SPUTUM PRODUCTION: 0
INSOMNIA: 0
COUGH: 0
CLAUDICATION: 0
PALPITATIONS: 0
TREMORS: 0
HEMOPTYSIS: 0
ORTHOPNEA: 0
HEADACHES: 0
CHILLS: 0
DIZZINESS: 0

## 2023-07-05 NOTE — PROGRESS NOTES
"  This visit was conducted via Zoom video call using secure and encrypted video conferencing technology to reduce spread of and/or potential exposures to COVID.  The patient was in a private location (home) in the state [facility].    The patient's identity was confirmed and verbal consent was obtained for this virtual visit.    [facility] - Follow Up EVALUATION   07/05/2023     Zuhair Jj is a male seen for eval/mgmt of HTN, est 5/2023   Zuhair Jj is initially referred by Eduardo Evans M.D.     Subjective      HTN:  Been feeling very poorly since changing medications to telmisartan and amlodipine    No \"extra beats\" like he was feeling prior to his ablation  Home BP log: variable, 140-150/80's    May 2023: ABPM 124/74 (while taking lisinopril-HCT)  Adherence to current HTN meds: compliant all of the time   Lifestyle factors:  Weight Change: [id number]   BMI Readings from Last 3 Encounters:   05/17/23 36.02 kg/m²   05/05/23 35.15 kg/m²   04/26/23 36.49 kg/m²      Diet pattern: limited eating out, grills/bakes meats, increase veg, yogurt  Daily salt intake estimate:  Low     EtOH: No  Exercise habits:  4-6mi/day most days per weeks  Perceived barriers to care: none   Pertinent HTN hx (reviewed at initial visit):   Age at HTN dx: 33yo, but progressively worsening over last few years   Past HTN medications: as noted   HTN crises:  No prior hospitalization or crises   Interfering substances/contributing factors:  None    Hyperlipidemia:  Stable, no current concerns  Current treatment: lifestyle changes  and High intensity  atorva     Antiplatelet/anticoagulation: Yes, Details: ASA 81mg , no bleeding noted     PreDM:  Lab Results   Component Value Date    HBA1C 5.4 01/06/2023   no prior dx of T2D or meds, no sx     CKD: No     Sleeping disorder/MAGDALENA: Yes, Details: dx, has CPAP  Good adherence and effectiveness      Hypothyroidism: No       Current Outpatient Medications:     " lisinopril-hydrochlorothiazide, 1 Tablet, Oral, DAILY    VITAMIN D PO, Take  by mouth.    atorvastatin, TAKE ONE TABLET BY MOUTH EVERY EVENING    metFORMIN ER, TAKE ONE TABLET BY MOUTH DAILY    ProAir HFA, 1-2 Puff, Inhalation, Q4HRS PRN    Qvar RediHaler, 1 Puff, Inhalation, BID    aspirin, 81 mg, Oral, DAILY AT 1800   Pcn [penicillins]    Social History     Tobacco Use    Smoking status: Never    Smokeless tobacco: Never   Vaping Use    Vaping Use: Never used   Substance Use Topics    Alcohol use: No    Drug use: No      Review of Systems   Constitutional:  Negative for chills, fever and malaise/fatigue.   Respiratory:  Negative for cough, hemoptysis, sputum production, shortness of breath and wheezing.    Cardiovascular:  Negative for chest pain, palpitations, orthopnea, claudication, leg swelling and PND.   Neurological:  Negative for dizziness, tremors and headaches.   Psychiatric/Behavioral:  The patient does not have insomnia.          Objective    There were no vitals filed for this visit.    There is no height or weight on file to calculate BMI.  BP Readings from Last 5 Encounters:   05/17/23 130/78   05/05/23 134/77   04/26/23 130/76   03/23/23 130/84   02/28/23 (!) 140/76      Physical Exam  Vitals reviewed.   Constitutional:       General: He is not in acute distress.     Appearance: He is well-developed. He is not diaphoretic.   HENT:      Head: Normocephalic and atraumatic.   Neck:      Thyroid: No thyromegaly.      Vascular: No JVD.   Cardiovascular:      Rate and Rhythm: Normal rate and regular rhythm.      Pulses: Normal pulses.      Heart sounds: No murmur heard.  Pulmonary:      Effort: No respiratory distress.      Breath sounds: Normal breath sounds. No wheezing or rales.   Musculoskeletal:         General: No swelling or tenderness.   Neurological:      General: No focal deficit present.      Mental Status: He is oriented to person, place, and time.      Cranial Nerves: No cranial nerve  deficit.      Coordination: Coordination normal.   Psychiatric:         Mood and Affect: Mood normal.         Behavior: Behavior normal.        Accessory Clinical Findings:     Lab Results   Component Value Date    CHOLSTRLTOT 190 2023    LDL 87 2023    HDL 74 2023    TRIGLYCERIDE 145 2023      No results found for: LIPOPROTA   No results found for: APOB    Lab Results   Component Value Date    PROTHROMBTM 13.5 2023    INR 1.04 2023       Lab Results   Component Value Date    HBA1C 5.4 2023      Lab Results   Component Value Date    SODIUM 133 (L) 2023    SODIUM 134 (L) 2023    POTASSIUM 3.7 2023    POTASSIUM 3.7 2023    CHLORIDE 101 (L) 2023    CHLORIDE 95 (L) 2023    CO2 23 2023    CO2 23 2023    GLUCOSE 112 (H) 2023    GLUCOSE 102 (H) 2023    BUN 11 2023    BUN 8 2023    CREATININE 0.76 (L) 2023    CREATININE 0.61 2023    GLOMRATE 107 2023          Lab Results   Component Value Date    URCREAT 143.20 2023    MICROALBUR 0.7 2023    MICROALBUR <1.2 2023    MALBCRT see below 2023     Lab Results   Component Value Date/Time    MALBCRT see below 2023 07:55 AM    MICROALBUR 0.7 2023 07:49 AM    MICROALBUR <1.2 2023 07:55 AM    MICROALBTIM 13.7 2023 07:49 AM      VASCULAR IMAGING:     Results for orders placed or performed in visit on 23   EKG   Result Value Ref Range    Report       [facility]    Test Date:  2023  Pt Name:    JERI SIMMONS                Department: [facility]  MRN:        [id number]                      Room:  Gender:     Male                         Technician: MARI  :        1970                   Requested By:JALEN WOLF  Order #:    [id number]                    Reading MD: Eduardo Evans MD    Measurements  Intervals                                Axis  Rate:       82                            P:          43  ID:         [id number]                          QRS:        70  QRSD:       91                           T:          75  QT:         374  QTc:        437    Interpretive Statements  Sinus rhythm  Ventricular premature complex  Compared to ECG 03/23/2023 11:04:19  No significant changes  Electronically Signed On 5- 12:21:53 PDT by Eduardo Evans MD       NM stress tst 3/2020    NUCLEAR IMAGING INTERPRETATION   Negative ECG for signs of ischemia at 8.3 METS of exercise.   Baseline hypertensive with normal blood pressure response to exercise.   Normal heart rate response to exercise.   No perfusion evidence of significant jeopardized viable myocardium or prior    myocardial infarction.   Normal left ventricular size, ejection fraction, and wall motion.   Frequent ventricular ectopy.   Little treadmill score +7.5, low risk.    ECG INTERPRETATION   No ischemic changes with stress compared to baseline ECG. Frequent premature    ventricular contractions (PVC) and single vetricular couplet with stress.    Frequent PVC with rest. PVC burden somewhat increased with stress.     Cardiac monitor 1/2023   Interpretive Statements   *   Monitoring started at 5:06 PM and continued for 24 hours. The overall   rhythm was Sinus. The average heart rate was 90   BPM. The minimum heart rate was 64 BPM, occurring at 9:35:58 AM. The maximum   heart rate was 125 BPM, occurring at   6:52:40 PM. The longest R-R interval was 1.4 seconds occurring at 4:01:28   AM.      Ventricular ectopic activity consisted of 9 triplets, 472 couplets, 11440   single, unifocal PVCs, 123 single endiastolic beats,   57374 in bigeminy, 5748 in trigeminy.   *   The patient's rhythm included 2 hours 20 minutes 17 seconds of   tachycardia. The fastest single episode of tachycardia   occurred at 6:51:29 PM, lasting 1 minute 20 seconds, with maximum heart rate   of 125 BPM.   *   Supraventricular ectopic activity consisted of 9 late beats, 4 single    PACs.   *   Diary Entries- No symptoms listed in diary. Event button used once during   monitoring, corresponding to a heart rate of 89   BPM. Frequent Ventricular ectopy noted.    EP ablation 2/28/23 (Dr. Evans)   1. Baseline rhythm: SR with RVOT PVCs  2. Baseline interval: RR interval 773 msec, AK interval 150 msec QRS duration 90 msec QT interval 325 msec   3. Pace mapping of RVOT with best map below the valve in the free wall.  4. Ablation this location with resolution of PVCs.  Just occasional PVCs seen on Isuprel washout.      MEDICAL DECISION-MAKING:  TODAY'S ASSESSMENT / STATUS / PLAN:  1. Mixed hyperlipidemia        2. Essential hypertension  lisinopril-hydrochlorothiazide (PRINZIDE) 20-25 MG per tablet      3. Type 2 diabetes mellitus with complication, without long-term current use of insulin (HCC)           Patient Type: Primary Prevention    1. BLOOD PRESSURE CONTROL   Qualifies as Resistant HTN (RH):  No, not on optimized, max-dosed or max-tolerated meds from 3 classes   Office BP Goal ACC/AHA (2017) goal <130/80  Home BP at goal:  no  Office BP at goal:  no, white coat effect, slight high SBP only   24h ABPM:  completed 5/2023  Device candidate? Not available at this time   Contributing factors: obesity, early onset    Bring home monitor to next appt to check against our machine   ABPM 124/76 (while taking lisinopril-HCT)  Plan:   Monitoring:   - start/continue home BP monitoring, reviewed correct technique:  Yes   - order 24h ABPM: done 5/2023  - monitor lytes/gfr routinely   - advised that stabilizing BP may require multiple med changes and close monitoring over the next several months, reviewed that initially there will be additional imaging and labs and the possibility of adverse drug rxn's and variability of his BP and HR until we have things stabilized.  Advised to contact our office as needed for questions or concerns.      2. WORK UP FOR SECONDARY CAUSES OF RH:  Obstructive Sleep Apnea: MAGDALENA  on CPAP  Strongly encouraged CPAP adherence to reduce RV strain, improve overall fatigue symptoms, and improve BP in both the short- and long-term as per HIPARCO (2013) and HIPARCO-2 (2021) studies.   - continue CPAP, defer mgmt to sleep MD     Renal parenchymal disease: Excluded  Renovascular HTN: Excluded  Primary Aldosteronism: Excluded  Thyroid Function: Excluded  Pheochromocytoma: Excluded   Cushing's:   Excluded   Coarctation of Aorta:  unlikely   Other: none identified    Recommendations At This Visit: as noted in orders         3. MEDICATION USE / ADHERENCE:   Assessment: Complete  Recommendations: Instructed to Continue Taking All Medications As Prescribed         4. HTN-MEDIATED END-ORGAN DAMAGE:  Left Ventricular Hypertrophy: Absent on  ECG Date: 2023  Urine Albuminuria: Not Yet Assessed on Date: ordered   Renal Function: G1  Ophthalmologic: Absent per patient report and/or eye specialist   Established Cardiovascular Disease: no ASCVD events     # PVCs , s/p ablation - stable, continue with cardiology     5. LIFESTYLE INTERVENTIONS:    SMOKING:    reports that he has never smoked. He has never used smokeless tobacco.   - continued complete avoidance of all tobacco products     PHYSICAL ACTIVITY: continue healthy activity to improve CV fitness.  In general, targeting >150min/week of moderate-level activity or as much as tolerated in light of functional status and co-morbidities     WEIGHT MANAGEMENT AND NUTRITION: DASH style dietary approach , Focus on reduced sodium to <2,000mg per day , Weight reduction approach - reduce caloric intake by 300kcal/day to achieve 1-2lb weight loss per week , and Provide specific dietary approach handouts     6. STANDARD HTN PHARMACOTHERAPY:  - stop telmisartan; restart lisinopril HCT 20-25mg daily    - stop amlodipine 5mg daily for now-- consider restarting in future if BP becomes difficult to control     7. ADDITIONAL AGENTS   None     LIPID MANAGEMENT:   Qualifies for  "Statin Therapy Based on 2018 ACC/AHA Guidelines: yes, Primary Prevention - 40-76yo, LDLc >70, <190 w/o DM  The 10-year ASCVD risk score (Chloe SAUER, et al., 2019) is: 7.6%7.5 - <20% \"intermediate risk\"   Major ASCVD events: None    High-risk conditions: N/A  Risk-enhancers: N/A  Currently on Statin: Yes  Tx goals: LDL-C <100 (if HTG, consider apoB <90, non-HDL-C <130)  At goal? Yes 5/2023  Plan:   - reinforced ongoing TLC measures as noted   - monitor labs   Meds:   - continue atorva 40mg daily     GLYCEMIA MANAGEMENT:  Prediabetic  Goal A1c < 7.0  Lab Results   Component Value Date    HBA1C 5.4 01/06/2023      Lab Results   Component Value Date/Time    MALBCRT see below 01/06/2023 07:55 AM    MICROALBUR 0.7 05/09/2023 07:49 AM    MICROALBUR <1.2 01/06/2023 07:55 AM    MICROALBTIM 13.7 05/09/2023 07:49 AM   Plan:  - continue current medication plan   - recommmend for routine care with PCP (or endocrine) to include regular A1c monitoring, annual albumin/creatinine ratio (ACR), annual diabetic retinopathy screening, foot exams, annual flu vaccine, and updates to pneumonia vaccines as appropriate      ANTIPLATELET/ANTICOAGULANT THERAPY:  continue ASA 81mg daily, no hx of bleeding       Studies Ordered At This Visit: None   Blood Work to Be Obtained Prior to Next Visit: as noted below   Disposition: RTC in 2 months in person to check BP's      KENDALL Sharma  [facility]   [facility]   [phone]    "

## 2023-07-12 NOTE — PROGRESS NOTES
"Pulmonary Clinic Note    Date of Visit: 7/28/2023     Chief Complaint:  No chief complaint on file.    HPI:   Zuhair Jj is a very pleasant 52 y.o. year old male never smoker, with a PMHx of asthma, MAGDALENA, HTN, T2DM, elevated BMI who presented to the Pulmonary Clinic for a regular follow up. Last seen in the office on 4/26/2023 with WILLIAM Malhotra.    Patient is followed by the pulmonary office for asthma.  PFTs in 2021 showed FVC of 3.59 L or 81%, FEV1 2.91 L or 83%, FEV1/FVC 81%, without positive bronchodilator response.  PFTs in 2023 show ***.  There is no recent imaging.  Patient is currently on Qvar 80, albuterol.     Interval events:  7/28/2023-  ***      Exacerbations this year:  1    Current medication regimen: Qvar 80 and albuterol    Oxygen use:    MMRC Grade:   0- Breathless only during strenuous exercise  1- Short of breath when hurrying or going up a small hill  2- Walks slower than friends due to breathlessness, has to stop at own pace  3- Stops to catch breath on level ground after 100m  4- Breathless with ambulating around house or ADLs      Past Medical History:   Diagnosis Date    Allergy     Arrhythmia     Asthma     Chest pain     Close exposure to COVID-19 virus 08/06/2020    Diabetes (HCC)     Dyspnea on exertion 03/24/2020    Heart murmur     High cholesterol 02/24/2023    Hyperlipidemia     Hypertension     Overweight     Rheumatic fever with cardiac involvement     patient cant recall but thinks there was a \"tear\" in his heart    Sleep apnea 02/24/2023    Sprain of medial collateral ligament of left knee 06/05/2019     Past Surgical History:   Procedure Laterality Date    OTHER      jaw reconstruction     Social History     Socioeconomic History    Marital status:      Spouse name: Not on file    Number of children: Not on file    Years of education: Not on file    Highest education level: Not on file   Occupational History    Occupation: Retired   Tobacco Use    Smoking " status: Never    Smokeless tobacco: Never   Vaping Use    Vaping Use: Never used   Substance and Sexual Activity    Alcohol use: No    Drug use: No    Sexual activity: Yes     Partners: Female   Other Topics Concern    Not on file   Social History Narrative    Not on file     Social Determinants of Health     Financial Resource Strain: Not on file   Food Insecurity: Not on file   Transportation Needs: Not on file   Physical Activity: Not on file   Stress: Not on file   Social Connections: Not on file   Intimate Partner Violence: Not on file   Housing Stability: Not on file        Family History   Problem Relation Age of Onset    Heart Failure Mother 68        cause of death    Heart Disease Mother     Hypertension Mother     Kidney cancer Father 52        cause of death    No Known Problems Brother     No Known Problems Brother     Cerebral aneurysm Maternal Grandfather 54        cause of death    Hypertension Maternal Grandfather     No Known Problems Child      Current Outpatient Medications on File Prior to Visit   Medication Sig Dispense Refill    metFORMIN ER (GLUCOPHAGE XR) 750 MG TABLET SR 24 HR TAKE ONE TABLET BY MOUTH DAILY 90 Tablet 3    lisinopril-hydrochlorothiazide (PRINZIDE) 20-25 MG per tablet Take 1 Tablet by mouth every day. 90 Tablet 3    VITAMIN D PO Take  by mouth.      atorvastatin (LIPITOR) 40 MG Tab TAKE ONE TABLET BY MOUTH EVERY EVENING 90 Tablet 3    PROAIR  (90 Base) MCG/ACT Aero Soln inhalation aerosol Inhale 1-2 Puffs every four hours as needed for Shortness of Breath. 8.5 g 6    QVAR REDIHALER 80 MCG/ACT inhaler Inhale 1 Puff 2 times a day. 3 Each 3    aspirin 81 MG EC tablet Take 81 mg by mouth every day at 6 PM.       No current facility-administered medications on file prior to visit.     Allergies: Pcn [penicillins]    ROS:   ROS    Vitals:  There were no vitals taken for this visit.    Physical Exam:  Physical Exam    Laboratory Data:  Multioximetry (Date: ***)-       PFTs:  (Date: 10/6/2021)-    Impression:      CXR: (Date: ***)-  Impression:      CT Chest: (Date: ***)-  Impression:      ECHO: (Date: ***)-  Impression:    Assessment and Plan:    Problem List Items Addressed This Visit    None      Diagnostic studies have been reviewed with the patient.    No follow-ups on file.     This note was generated using voice recognition software which has a chance of producing errors of grammar and possibly content.  I have made every reasonable attempt to find and correct any obvious errors, but it should be expected that some may not be found prior to finalization of this note.    Time spent in record review prior to patient arrival, reviewing results, and in face-to-face encounter totaled *** min.  __________  WILLIAM Mahoney  Pulmonary Medicine  [facility]

## 2023-07-14 ENCOUNTER — OFFICE VISIT (OUTPATIENT)
Dept: MEDICAL GROUP | Facility: CLINIC | Age: 53
End: 2023-07-14
Payer: COMMERCIAL

## 2023-07-14 VITALS
RESPIRATION RATE: 20 BRPM | HEIGHT: 66 IN | OXYGEN SATURATION: 95 % | TEMPERATURE: 98.7 F | BODY MASS INDEX: 35 KG/M2 | HEART RATE: 80 BPM | DIASTOLIC BLOOD PRESSURE: 72 MMHG | SYSTOLIC BLOOD PRESSURE: 128 MMHG | WEIGHT: 217.8 LBS

## 2023-07-14 DIAGNOSIS — M54.2 NECK PAIN, ACUTE: ICD-10-CM

## 2023-07-14 DIAGNOSIS — M54.42 ACUTE BILATERAL LOW BACK PAIN WITH BILATERAL SCIATICA: ICD-10-CM

## 2023-07-14 DIAGNOSIS — M54.41 ACUTE BILATERAL LOW BACK PAIN WITH BILATERAL SCIATICA: ICD-10-CM

## 2023-07-14 PROCEDURE — 3074F SYST BP LT 130 MM HG: CPT | Performed by: PHYSICIAN ASSISTANT

## 2023-07-14 PROCEDURE — 99214 OFFICE O/P EST MOD 30 MIN: CPT | Performed by: PHYSICIAN ASSISTANT

## 2023-07-14 PROCEDURE — 3078F DIAST BP <80 MM HG: CPT | Performed by: PHYSICIAN ASSISTANT

## 2023-07-14 RX ORDER — DICLOFENAC SODIUM 75 MG/1
75 TABLET, DELAYED RELEASE ORAL 2 TIMES DAILY
Qty: 60 TABLET | Refills: 1 | Status: SHIPPED | OUTPATIENT
Start: 2023-07-14 | End: 2023-08-23

## 2023-07-14 RX ORDER — TIZANIDINE HYDROCHLORIDE 2 MG/1
2 CAPSULE, GELATIN COATED ORAL 3 TIMES DAILY
Qty: 90 CAPSULE | Refills: 3 | Status: SHIPPED
Start: 2023-07-14 | End: 2023-07-26

## 2023-07-14 RX ORDER — KETOROLAC TROMETHAMINE 30 MG/ML
30 INJECTION, SOLUTION INTRAMUSCULAR; INTRAVENOUS ONCE
Status: COMPLETED | OUTPATIENT
Start: 2023-07-14 | End: 2023-07-14

## 2023-07-14 RX ADMIN — KETOROLAC TROMETHAMINE 30 MG: 30 INJECTION, SOLUTION INTRAMUSCULAR; INTRAVENOUS at 13:43

## 2023-07-14 ASSESSMENT — FIBROSIS 4 INDEX: FIB4 SCORE: 2.26

## 2023-07-14 NOTE — PROGRESS NOTES
Chief Complaint   Patient presents with    Back Pain     Last week pt was helping a friend move heavy equipment he sts he felt sore after moving those items a few days later a friend needed help moving some logs and Sunday the patient woke up in a lot of pain. Pt saw chiropractor but he is not feeling better pt sts pain is hip, back and neck         HISTORY OF PRESENT ILLNESS: Patient is a 52 y.o. male established patient who presents today to discuss the following issues:    Assessment/Plan  Acute bilateral low back pain with bilateral sciatica  Patient states he was helping a friend move heavy equipment last week and started feeling some discomfort.  He states a few days later he developed his neighbor move some logs and then Sunday morning woke up in significant pain.  He did go to see a chiropractor but states that it did nothing to help the pain.  He is having pain in his low back with bilateral sciatica and he is also having acute neck pain as well.  We will get some x-rays done on suspicious that they will not show us the reason he is having pain.  We will start him on tizanidine 2 mg 3 times daily as needed, Diclofenac 75 mg twice daily, and we will give him a Toradol injection to get things started.  He understands he should not take the diclofenac until this evening due to the Toradol injection.  We will have him get his x-rays and follow-up in about 2 weeks for reevaluation of his pain.      Reviewed risks and benefits of treatment plan. Patient verbally agrees to plan of care.     Patient Active Problem List    Diagnosis Date Noted    Acute bilateral low back pain with bilateral sciatica 07/14/2023    Neck pain, acute 07/14/2023    Elevated liver enzymes 05/05/2023    H/O cardiac radiofrequency ablation, 2/28/23. Dr VELASQUEZ Evans 03/23/2023    Elevated LFTs 01/13/2023    Obesity (BMI 30-39.9) 01/10/2023    Otitis media follow-up, not resolved, right 12/30/2022    Trigger little finger of right hand 12/27/2021  "   PVC's (premature ventricular contractions) 03/24/2020    Chronic left shoulder pain 01/27/2020    Mixed hyperlipidemia 01/27/2020    Type 2 diabetes mellitus with complication, without long-term current use of insulin ([facility]) 11/21/2018    Class 2 severe obesity due to excess calories with serious comorbidity and body mass index (BMI) of 35.0 to 35.9 in adult ([facility]) 06/05/2018    Elevated cortisol level 06/05/2018    Mild intermittent asthma without complication 10/06/2016    MAGDALENA (obstructive sleep apnea) 10/06/2016    Essential hypertension 10/06/2016       Allergies:Pcn [penicillins]    Current Outpatient Medications   Medication Sig Dispense Refill    diclofenac DR (VOLTAREN) 75 MG Tablet Delayed Response Take 1 Tablet by mouth 2 times a day. 60 Tablet 1    metFORMIN ER (GLUCOPHAGE XR) 750 MG TABLET SR 24 HR TAKE ONE TABLET BY MOUTH DAILY 90 Tablet 3    lisinopril-hydrochlorothiazide (PRINZIDE) 20-25 MG per tablet Take 1 Tablet by mouth every day. 90 Tablet 3    VITAMIN D PO Take  by mouth.      atorvastatin (LIPITOR) 40 MG Tab TAKE ONE TABLET BY MOUTH EVERY EVENING 90 Tablet 3    PROAIR  (90 Base) MCG/ACT Aero Soln inhalation aerosol Inhale 1-2 Puffs every four hours as needed for Shortness of Breath. 8.5 g 6    QVAR REDIHALER 80 MCG/ACT inhaler Inhale 1 Puff 2 times a day. 3 Each 3    aspirin 81 MG EC tablet Take 81 mg by mouth every day at 6 PM.      tizanidine (ZANAFLEX) 4 MG Tab Take 1 Tablet by mouth 3 times a day. 90 Tablet 3     No current facility-administered medications for this visit.       Wt Readings from Last 3 Encounters:   07/26/23 98.5 kg (217 lb 3.2 oz)   07/14/23 98.8 kg (217 lb 12.8 oz)   05/17/23 104 kg (230 lb)   ]    Exam:  /72 (BP Location: Left arm, Patient Position: Sitting, BP Cuff Size: Adult)   Pulse 80   Temp 37.1 °C (98.7 °F) (Temporal)   Resp 20   Ht 1.687 m (5' 6.4\")   Wt 98.8 kg (217 lb 12.8 oz)   SpO2 95%  Body mass index is 34.73 kg/m².   General:  Well " nourished, well developed male. No apparent distress. Not ill appearing.  Eyes: EOM intact, PERRL, conjunctiva non-injected, sclera non-icteric.  Neck: Supple with no cervical lymphadenopathy, JVD, palpable thyroid nodules or carotid bruits.  Pulmonary: Clear to ausculation bilaterally. Normal effort. No rales, rhonchi, or wheezing.  Cardiovascular: Regular rate and rhythm without murmur, rub or gallop.   Extremities: Warm and well perfused with no edema.  Skin: Intact with no obvious rashes or lesions.  Neuro: Cranial nerves I-XII grossly intact.  Psych: Alert and oriented x 3.  Appropriately dressed. Mood and affect appropriate.    Return in about 12 days (around 7/26/2023) for f/u back and hip pain.  Please note that this dictation was created using voice recognition software. I have made every reasonable attempt to correct obvious errors, but I expect that there are errors of grammar and possibly content that I did not discover before finalizing the note.

## 2023-07-17 ENCOUNTER — NON-PROVIDER VISIT (OUTPATIENT)
Dept: URGENT CARE | Facility: PHYSICIAN GROUP | Age: 53
End: 2023-07-17
Payer: COMMERCIAL

## 2023-07-17 ENCOUNTER — APPOINTMENT (OUTPATIENT)
Dept: RADIOLOGY | Facility: IMAGING CENTER | Age: 53
End: 2023-07-17
Attending: PHYSICIAN ASSISTANT
Payer: COMMERCIAL

## 2023-07-17 DIAGNOSIS — M54.41 ACUTE BILATERAL LOW BACK PAIN WITH BILATERAL SCIATICA: ICD-10-CM

## 2023-07-17 DIAGNOSIS — M54.2 NECK PAIN, ACUTE: ICD-10-CM

## 2023-07-17 DIAGNOSIS — M54.42 ACUTE BILATERAL LOW BACK PAIN WITH BILATERAL SCIATICA: ICD-10-CM

## 2023-07-17 PROCEDURE — 72040 X-RAY EXAM NECK SPINE 2-3 VW: CPT | Mod: TC,FY | Performed by: RADIOLOGY

## 2023-07-17 PROCEDURE — 72100 X-RAY EXAM L-S SPINE 2/3 VWS: CPT | Mod: TC,FY | Performed by: RADIOLOGY

## 2023-07-26 ENCOUNTER — OFFICE VISIT (OUTPATIENT)
Dept: MEDICAL GROUP | Facility: CLINIC | Age: 53
End: 2023-07-26
Payer: COMMERCIAL

## 2023-07-26 VITALS
HEIGHT: 66 IN | HEART RATE: 78 BPM | DIASTOLIC BLOOD PRESSURE: 72 MMHG | TEMPERATURE: 99.1 F | SYSTOLIC BLOOD PRESSURE: 138 MMHG | BODY MASS INDEX: 34.91 KG/M2 | WEIGHT: 217.2 LBS | RESPIRATION RATE: 20 BRPM | OXYGEN SATURATION: 95 %

## 2023-07-26 DIAGNOSIS — M54.42 ACUTE BILATERAL LOW BACK PAIN WITH BILATERAL SCIATICA: ICD-10-CM

## 2023-07-26 DIAGNOSIS — M54.41 ACUTE BILATERAL LOW BACK PAIN WITH BILATERAL SCIATICA: ICD-10-CM

## 2023-07-26 DIAGNOSIS — G89.29 CHRONIC MID BACK PAIN: ICD-10-CM

## 2023-07-26 DIAGNOSIS — M54.2 NECK PAIN, ACUTE: ICD-10-CM

## 2023-07-26 DIAGNOSIS — M54.9 CHRONIC MID BACK PAIN: ICD-10-CM

## 2023-07-26 PROCEDURE — 99214 OFFICE O/P EST MOD 30 MIN: CPT | Performed by: PHYSICIAN ASSISTANT

## 2023-07-26 PROCEDURE — 3075F SYST BP GE 130 - 139MM HG: CPT | Performed by: PHYSICIAN ASSISTANT

## 2023-07-26 PROCEDURE — 3078F DIAST BP <80 MM HG: CPT | Performed by: PHYSICIAN ASSISTANT

## 2023-07-26 RX ORDER — TIZANIDINE 4 MG/1
4 TABLET ORAL 3 TIMES DAILY
Qty: 90 TABLET | Refills: 3 | Status: SHIPPED | OUTPATIENT
Start: 2023-07-26 | End: 2023-08-23

## 2023-07-26 ASSESSMENT — FIBROSIS 4 INDEX: FIB4 SCORE: 2.26

## 2023-07-26 NOTE — PROGRESS NOTES
Chief Complaint   Patient presents with    Follow-Up     Back pain - pt sts the muscle relaxer's and anti-inflammatories   Pt slipped and fell again        HISTORY OF PRESENT ILLNESS: Patient is a 52 y.o. male established patient who presents today to discuss the following issues:    Assessment/Plan  Acute bilateral low back pain with bilateral sciatica  Patient here today to follow-up on x-rays and medication.  At last visit he was started on tizanidine 2 mg 3 times daily as needed, diclofenac 75 mg twice daily.  His Lumbar spine x-ray shows degenerative disc disease and facet arthropathy present at the level of L5-S1.  The cervical spine x-ray shows normal cervical spine without degenerative disc disease or facet arthropathy.  He is continuing to have a significant amount of pain and states that the muscle relaxers and anti-inflammatories are not helping.  He states he slipped and fell again reinjuring the same areas.  We will increase his tizanidine to 4 mg 3 times daily as needed, he will continue diclofenac as prescribed.  We will order MRI of both lumbar and cervical spine for further evaluation of his continued pain.  He will follow-up in 2 weeks to review his MRI.      Reviewed risks and benefits of treatment plan. Patient verbally agrees to plan of care.     Patient Active Problem List    Diagnosis Date Noted    Acute bilateral low back pain with bilateral sciatica 07/14/2023    Neck pain, acute 07/14/2023    Elevated liver enzymes 05/05/2023    H/O cardiac radiofrequency ablation, 2/28/23. Dr VELASQUEZ Evans 03/23/2023    Elevated LFTs 01/13/2023    Obesity (BMI 30-39.9) 01/10/2023    Otitis media follow-up, not resolved, right 12/30/2022    Trigger little finger of right hand 12/27/2021    PVC's (premature ventricular contractions) 03/24/2020    Chronic left shoulder pain 01/27/2020    Mixed hyperlipidemia 01/27/2020    Type 2 diabetes mellitus with complication, without long-term current use of insulin (HCC)  "11/21/2018    Class 2 severe obesity due to excess calories with serious comorbidity and body mass index (BMI) of 35.0 to 35.9 in adult ([facility]) 06/05/2018    Elevated cortisol level 06/05/2018    Mild intermittent asthma without complication 10/06/2016    MAGDALENA (obstructive sleep apnea) 10/06/2016    Essential hypertension 10/06/2016       Allergies:Pcn [penicillins]    Current Outpatient Medications   Medication Sig Dispense Refill    tizanidine (ZANAFLEX) 4 MG Tab Take 1 Tablet by mouth 3 times a day. 90 Tablet 3    diclofenac DR (VOLTAREN) 75 MG Tablet Delayed Response Take 1 Tablet by mouth 2 times a day. 60 Tablet 1    metFORMIN ER (GLUCOPHAGE XR) 750 MG TABLET SR 24 HR TAKE ONE TABLET BY MOUTH DAILY 90 Tablet 3    lisinopril-hydrochlorothiazide (PRINZIDE) 20-25 MG per tablet Take 1 Tablet by mouth every day. 90 Tablet 3    VITAMIN D PO Take  by mouth.      atorvastatin (LIPITOR) 40 MG Tab TAKE ONE TABLET BY MOUTH EVERY EVENING 90 Tablet 3    PROAIR  (90 Base) MCG/ACT Aero Soln inhalation aerosol Inhale 1-2 Puffs every four hours as needed for Shortness of Breath. 8.5 g 6    QVAR REDIHALER 80 MCG/ACT inhaler Inhale 1 Puff 2 times a day. 3 Each 3    aspirin 81 MG EC tablet Take 81 mg by mouth every day at 6 PM.      traMADol (ULTRAM) 50 MG Tab Take 1 Tablet by mouth every four hours as needed for Severe Pain for up to 10 days. 30 Tablet 0     No current facility-administered medications for this visit.       Wt Readings from Last 3 Encounters:   07/26/23 98.5 kg (217 lb 3.2 oz)   07/14/23 98.8 kg (217 lb 12.8 oz)   05/17/23 104 kg (230 lb)   ]    Exam:  /72 (BP Location: Left arm, Patient Position: Sitting, BP Cuff Size: Adult)   Pulse 78   Temp 37.3 °C (99.1 °F) (Temporal)   Resp 20   Ht 1.676 m (5' 6\")   Wt 98.5 kg (217 lb 3.2 oz)   SpO2 95%  Body mass index is 35.06 kg/m².   General:  Well nourished, well developed male.  Mild distress. Not ill appearing.  Eyes: EOM intact, PERRL, conjunctiva " non-injected, sclera non-icteric.  Neck: Supple with no cervical lymphadenopathy, JVD, palpable thyroid nodules or carotid bruits.  Pulmonary: Clear to ausculation bilaterally. Normal effort. No rales, rhonchi, or wheezing.  Cardiovascular: Regular rate and rhythm without murmur, rub or gallop.   Extremities: Warm and well perfused with no edema.  Difficulty with ambulation and range of motion secondary to back pain.  Skin: Intact with no obvious rashes or lesions.  Neuro: Cranial nerves I-XII grossly intact.  Psych: Alert and oriented x 3.  Appropriately dressed. Mood and affect appropriate.    Return in about 2 weeks (around 8/9/2023) for f/u MRI.  Please note that this dictation was created using voice recognition software. I have made every reasonable attempt to correct obvious errors, but I expect that there are errors of grammar and possibly content that I did not discover before finalizing the note.

## 2023-07-28 ENCOUNTER — APPOINTMENT (OUTPATIENT)
Dept: SLEEP MEDICINE | Facility: MEDICAL CENTER | Age: 53
End: 2023-07-28
Payer: COMMERCIAL

## 2023-07-28 ENCOUNTER — APPOINTMENT (OUTPATIENT)
Dept: SLEEP MEDICINE | Facility: MEDICAL CENTER | Age: 53
End: 2023-07-28
Attending: NURSE PRACTITIONER
Payer: COMMERCIAL

## 2023-08-03 ENCOUNTER — TELEPHONE (OUTPATIENT)
Dept: MEDICAL GROUP | Facility: CLINIC | Age: 53
End: 2023-08-03
Payer: COMMERCIAL

## 2023-08-03 NOTE — TELEPHONE ENCOUNTER
Pt came into office and stated he had discussed doubling the dose on his muscle relaxer's but he doesn't think his stomach can handle it as he hasn't been feeling well on the dose he is taking currently

## 2023-08-13 NOTE — ASSESSMENT & PLAN NOTE
Patient states he was helping a friend move heavy equipment last week and started feeling some discomfort.  He states a few days later he developed his neighbor move some logs and then Sunday morning woke up in significant pain.  He did go to see a chiropractor but states that it did nothing to help the pain.  He is having pain in his low back with bilateral sciatica and he is also having acute neck pain as well.  We will get some x-rays done on suspicious that they will not show us the reason he is having pain.  We will start him on tizanidine 2 mg 3 times daily as needed, Diclofenac 75 mg twice daily, and we will give him a Toradol injection to get things started.  He understands he should not take the diclofenac until this evening due to the Toradol injection.  We will have him get his x-rays and follow-up in about 2 weeks for reevaluation of his pain.

## 2023-08-14 DIAGNOSIS — M54.2 NECK PAIN, ACUTE: ICD-10-CM

## 2023-08-14 DIAGNOSIS — M54.41 ACUTE BILATERAL LOW BACK PAIN WITH BILATERAL SCIATICA: ICD-10-CM

## 2023-08-14 DIAGNOSIS — M54.42 ACUTE BILATERAL LOW BACK PAIN WITH BILATERAL SCIATICA: ICD-10-CM

## 2023-08-14 DIAGNOSIS — G89.29 CHRONIC LEFT SHOULDER PAIN: ICD-10-CM

## 2023-08-14 DIAGNOSIS — M25.512 CHRONIC LEFT SHOULDER PAIN: ICD-10-CM

## 2023-08-14 RX ORDER — TRAMADOL HYDROCHLORIDE 50 MG/1
50 TABLET ORAL EVERY 4 HOURS PRN
Qty: 30 TABLET | Refills: 0 | Status: SHIPPED | OUTPATIENT
Start: 2023-08-14 | End: 2023-08-23

## 2023-08-14 NOTE — TELEPHONE ENCOUNTER
Patient having difficulty tolerating NSAIDs due to GI distress. We will give a 10 day supply of Tramadol. He understands that he cannot get refills through our office. He has a follow up appointment in our office 08/18/2023 and we will discuss recent imaging and discuss next options. This likely will be physical therapy unless imaging shows something unexpected.

## 2023-08-16 ENCOUNTER — HOSPITAL ENCOUNTER (OUTPATIENT)
Dept: RADIOLOGY | Facility: MEDICAL CENTER | Age: 53
End: 2023-08-16
Attending: PHYSICIAN ASSISTANT
Payer: COMMERCIAL

## 2023-08-16 DIAGNOSIS — M54.42 ACUTE BILATERAL LOW BACK PAIN WITH BILATERAL SCIATICA: ICD-10-CM

## 2023-08-16 DIAGNOSIS — M54.41 ACUTE BILATERAL LOW BACK PAIN WITH BILATERAL SCIATICA: ICD-10-CM

## 2023-08-16 DIAGNOSIS — G89.29 CHRONIC MID BACK PAIN: ICD-10-CM

## 2023-08-16 DIAGNOSIS — M54.9 CHRONIC MID BACK PAIN: ICD-10-CM

## 2023-08-16 PROCEDURE — 72146 MRI CHEST SPINE W/O DYE: CPT

## 2023-08-16 PROCEDURE — 72148 MRI LUMBAR SPINE W/O DYE: CPT

## 2023-08-19 NOTE — ASSESSMENT & PLAN NOTE
Patient here today to follow-up on x-rays and medication.  At last visit he was started on tizanidine 2 mg 3 times daily as needed, diclofenac 75 mg twice daily.  His Lumbar spine x-ray shows degenerative disc disease and facet arthropathy present at the level of L5-S1.  The cervical spine x-ray shows normal cervical spine without degenerative disc disease or facet arthropathy.  He is continuing to have a significant amount of pain and states that the muscle relaxers and anti-inflammatories are not helping.  He states he slipped and fell again reinjuring the same areas.  We will increase his tizanidine to 4 mg 3 times daily as needed, he will continue diclofenac as prescribed.  We will order MRI of both lumbar and cervical spine for further evaluation of his continued pain.  He will follow-up in 2 weeks to review his MRI.

## 2023-08-23 ENCOUNTER — APPOINTMENT (OUTPATIENT)
Dept: RADIOLOGY | Facility: MEDICAL CENTER | Age: 53
DRG: 314 | End: 2023-08-23
Attending: EMERGENCY MEDICINE
Payer: COMMERCIAL

## 2023-08-23 ENCOUNTER — HOSPITAL ENCOUNTER (INPATIENT)
Facility: MEDICAL CENTER | Age: 53
LOS: 2 days | DRG: 314 | End: 2023-08-25
Attending: EMERGENCY MEDICINE | Admitting: INTERNAL MEDICINE
Payer: COMMERCIAL

## 2023-08-23 DIAGNOSIS — M54.50 ACUTE MIDLINE LOW BACK PAIN WITHOUT SCIATICA: ICD-10-CM

## 2023-08-23 DIAGNOSIS — R74.8 ELEVATED LIVER ENZYMES: ICD-10-CM

## 2023-08-23 DIAGNOSIS — R74.01 TRANSAMINITIS: ICD-10-CM

## 2023-08-23 DIAGNOSIS — I10 ESSENTIAL HYPERTENSION: ICD-10-CM

## 2023-08-23 DIAGNOSIS — N17.9 SEPSIS WITH ACUTE RENAL FAILURE WITHOUT SEPTIC SHOCK, DUE TO UNSPECIFIED ORGANISM, UNSPECIFIED ACUTE RENAL FAILURE TYPE (HCC): ICD-10-CM

## 2023-08-23 DIAGNOSIS — R53.1 WEAKNESS: ICD-10-CM

## 2023-08-23 DIAGNOSIS — A41.9 SEPSIS WITH ACUTE RENAL FAILURE WITHOUT SEPTIC SHOCK, DUE TO UNSPECIFIED ORGANISM, UNSPECIFIED ACUTE RENAL FAILURE TYPE (HCC): ICD-10-CM

## 2023-08-23 DIAGNOSIS — R65.20 SEPSIS WITH ACUTE RENAL FAILURE WITHOUT SEPTIC SHOCK, DUE TO UNSPECIFIED ORGANISM, UNSPECIFIED ACUTE RENAL FAILURE TYPE (HCC): ICD-10-CM

## 2023-08-23 DIAGNOSIS — J45.21 MILD INTERMITTENT ASTHMA WITH ACUTE EXACERBATION: ICD-10-CM

## 2023-08-23 DIAGNOSIS — N17.9 AKI (ACUTE KIDNEY INJURY) (HCC): ICD-10-CM

## 2023-08-23 PROBLEM — E66.01 SEVERE OBESITY (HCC): Status: ACTIVE | Noted: 2023-08-23

## 2023-08-23 PROBLEM — S22.060A COMPRESSION FRACTURE OF T8 VERTEBRA (HCC): Status: ACTIVE | Noted: 2023-08-23

## 2023-08-23 PROBLEM — E87.29 HIGH ANION GAP METABOLIC ACIDOSIS: Status: ACTIVE | Noted: 2023-08-23

## 2023-08-23 PROBLEM — I95.9 HYPOTENSION: Status: ACTIVE | Noted: 2023-08-23

## 2023-08-23 LAB
ALBUMIN SERPL BCP-MCNC: 4.3 G/DL (ref 3.2–4.9)
ALBUMIN/GLOB SERPL: 1.6 G/DL
ALP SERPL-CCNC: 95 U/L (ref 30–99)
ALT SERPL-CCNC: 536 U/L (ref 2–50)
ANION GAP SERPL CALC-SCNC: 21 MMOL/L (ref 7–16)
APPEARANCE UR: CLEAR
AST SERPL-CCNC: 431 U/L (ref 12–45)
BASOPHILS # BLD AUTO: 0.9 % (ref 0–1.8)
BASOPHILS # BLD: 0.07 K/UL (ref 0–0.12)
BILIRUB SERPL-MCNC: 0.7 MG/DL (ref 0.1–1.5)
BILIRUB UR QL STRIP.AUTO: NEGATIVE
BUN SERPL-MCNC: 16 MG/DL (ref 8–22)
CALCIUM ALBUM COR SERPL-MCNC: 9.1 MG/DL (ref 8.5–10.5)
CALCIUM SERPL-MCNC: 9.3 MG/DL (ref 8.5–10.5)
CHLORIDE SERPL-SCNC: 93 MMOL/L (ref 96–112)
CO2 SERPL-SCNC: 17 MMOL/L (ref 20–33)
COLOR UR: YELLOW
CREAT SERPL-MCNC: 1.87 MG/DL (ref 0.5–1.4)
CRP SERPL HS-MCNC: 1.36 MG/DL (ref 0–0.75)
EOSINOPHIL # BLD AUTO: 0.32 K/UL (ref 0–0.51)
EOSINOPHIL NFR BLD: 4.2 % (ref 0–6.9)
ERYTHROCYTE [DISTWIDTH] IN BLOOD BY AUTOMATED COUNT: 45.8 FL (ref 35.9–50)
ERYTHROCYTE [SEDIMENTATION RATE] IN BLOOD BY WESTERGREN METHOD: 22 MM/HOUR (ref 0–20)
FLUAV RNA SPEC QL NAA+PROBE: NEGATIVE
FLUBV RNA SPEC QL NAA+PROBE: NEGATIVE
GFR SERPLBLD CREATININE-BSD FMLA CKD-EPI: 42 ML/MIN/1.73 M 2
GLOBULIN SER CALC-MCNC: 2.7 G/DL (ref 1.9–3.5)
GLUCOSE BLD STRIP.AUTO-MCNC: 135 MG/DL (ref 65–99)
GLUCOSE SERPL-MCNC: 123 MG/DL (ref 65–99)
GLUCOSE UR STRIP.AUTO-MCNC: 250 MG/DL
HCT VFR BLD AUTO: 38.7 % (ref 42–52)
HGB BLD-MCNC: 13.3 G/DL (ref 14–18)
IMM GRANULOCYTES # BLD AUTO: 0.04 K/UL (ref 0–0.11)
IMM GRANULOCYTES NFR BLD AUTO: 0.5 % (ref 0–0.9)
KETONES UR STRIP.AUTO-MCNC: NEGATIVE MG/DL
LACTATE SERPL-SCNC: 4.1 MMOL/L (ref 0.5–2)
LACTATE SERPL-SCNC: 4.3 MMOL/L (ref 0.5–2)
LEUKOCYTE ESTERASE UR QL STRIP.AUTO: NEGATIVE
LIPASE SERPL-CCNC: 28 U/L (ref 11–82)
LYMPHOCYTES # BLD AUTO: 1.7 K/UL (ref 1–4.8)
LYMPHOCYTES NFR BLD: 22.5 % (ref 22–41)
MCH RBC QN AUTO: 33.7 PG (ref 27–33)
MCHC RBC AUTO-ENTMCNC: 34.4 G/DL (ref 32.3–36.5)
MCV RBC AUTO: 98 FL (ref 81.4–97.8)
MICRO URNS: ABNORMAL
MONOCYTES # BLD AUTO: 1.17 K/UL (ref 0–0.85)
MONOCYTES NFR BLD AUTO: 15.5 % (ref 0–13.4)
NEUTROPHILS # BLD AUTO: 4.25 K/UL (ref 1.82–7.42)
NEUTROPHILS NFR BLD: 56.4 % (ref 44–72)
NITRITE UR QL STRIP.AUTO: NEGATIVE
NRBC # BLD AUTO: 0 K/UL
NRBC BLD-RTO: 0 /100 WBC (ref 0–0.2)
PH UR STRIP.AUTO: 6 [PH] (ref 5–8)
PLATELET # BLD AUTO: 173 K/UL (ref 164–446)
PMV BLD AUTO: 8.7 FL (ref 9–12.9)
POTASSIUM SERPL-SCNC: 4.6 MMOL/L (ref 3.6–5.5)
PROT SERPL-MCNC: 7 G/DL (ref 6–8.2)
PROT UR QL STRIP: NEGATIVE MG/DL
RBC # BLD AUTO: 3.95 M/UL (ref 4.7–6.1)
RBC UR QL AUTO: NEGATIVE
RSV RNA SPEC QL NAA+PROBE: NEGATIVE
SARS-COV-2 RNA RESP QL NAA+PROBE: NOTDETECTED
SODIUM SERPL-SCNC: 131 MMOL/L (ref 135–145)
SP GR UR STRIP.AUTO: 1.01
SPECIMEN SOURCE: NORMAL
UROBILINOGEN UR STRIP.AUTO-MCNC: 0.2 MG/DL
WBC # BLD AUTO: 7.6 K/UL (ref 4.8–10.8)

## 2023-08-23 PROCEDURE — A9579 GAD-BASE MR CONTRAST NOS,1ML: HCPCS | Performed by: EMERGENCY MEDICINE

## 2023-08-23 PROCEDURE — 96365 THER/PROPH/DIAG IV INF INIT: CPT

## 2023-08-23 PROCEDURE — C9803 HOPD COVID-19 SPEC COLLECT: HCPCS | Performed by: EMERGENCY MEDICINE

## 2023-08-23 PROCEDURE — 72149 MRI LUMBAR SPINE W/DYE: CPT

## 2023-08-23 PROCEDURE — 700117 HCHG RX CONTRAST REV CODE 255: Performed by: EMERGENCY MEDICINE

## 2023-08-23 PROCEDURE — A9270 NON-COVERED ITEM OR SERVICE: HCPCS | Performed by: INTERNAL MEDICINE

## 2023-08-23 PROCEDURE — 80053 COMPREHEN METABOLIC PANEL: CPT

## 2023-08-23 PROCEDURE — 700102 HCHG RX REV CODE 250 W/ 637 OVERRIDE(OP): Performed by: INTERNAL MEDICINE

## 2023-08-23 PROCEDURE — 700105 HCHG RX REV CODE 258: Mod: JZ | Performed by: EMERGENCY MEDICINE

## 2023-08-23 PROCEDURE — 86140 C-REACTIVE PROTEIN: CPT

## 2023-08-23 PROCEDURE — 83690 ASSAY OF LIPASE: CPT

## 2023-08-23 PROCEDURE — 36415 COLL VENOUS BLD VENIPUNCTURE: CPT

## 2023-08-23 PROCEDURE — 87086 URINE CULTURE/COLONY COUNT: CPT

## 2023-08-23 PROCEDURE — 82962 GLUCOSE BLOOD TEST: CPT

## 2023-08-23 PROCEDURE — 96361 HYDRATE IV INFUSION ADD-ON: CPT

## 2023-08-23 PROCEDURE — 85025 COMPLETE CBC W/AUTO DIFF WBC: CPT

## 2023-08-23 PROCEDURE — 96375 TX/PRO/DX INJ NEW DRUG ADDON: CPT

## 2023-08-23 PROCEDURE — 0241U HCHG SARS-COV-2 COVID-19 NFCT DS RESP RNA 4 TRGT MIC: CPT

## 2023-08-23 PROCEDURE — 71045 X-RAY EXAM CHEST 1 VIEW: CPT

## 2023-08-23 PROCEDURE — 72147 MRI CHEST SPINE W/DYE: CPT

## 2023-08-23 PROCEDURE — 700111 HCHG RX REV CODE 636 W/ 250 OVERRIDE (IP): Performed by: INTERNAL MEDICINE

## 2023-08-23 PROCEDURE — 99223 1ST HOSP IP/OBS HIGH 75: CPT | Performed by: INTERNAL MEDICINE

## 2023-08-23 PROCEDURE — 74177 CT ABD & PELVIS W/CONTRAST: CPT

## 2023-08-23 PROCEDURE — 770000 HCHG ROOM/CARE - INTERMEDIATE ICU *

## 2023-08-23 PROCEDURE — 87040 BLOOD CULTURE FOR BACTERIA: CPT

## 2023-08-23 PROCEDURE — 99285 EMERGENCY DEPT VISIT HI MDM: CPT

## 2023-08-23 PROCEDURE — 83605 ASSAY OF LACTIC ACID: CPT

## 2023-08-23 PROCEDURE — 85652 RBC SED RATE AUTOMATED: CPT

## 2023-08-23 PROCEDURE — 94760 N-INVAS EAR/PLS OXIMETRY 1: CPT

## 2023-08-23 PROCEDURE — 81003 URINALYSIS AUTO W/O SCOPE: CPT

## 2023-08-23 PROCEDURE — 700111 HCHG RX REV CODE 636 W/ 250 OVERRIDE (IP): Mod: JZ | Performed by: EMERGENCY MEDICINE

## 2023-08-23 PROCEDURE — 72156 MRI NECK SPINE W/O & W/DYE: CPT

## 2023-08-23 PROCEDURE — 76705 ECHO EXAM OF ABDOMEN: CPT

## 2023-08-23 RX ORDER — HYDROMORPHONE HYDROCHLORIDE 1 MG/ML
0.5 INJECTION, SOLUTION INTRAMUSCULAR; INTRAVENOUS; SUBCUTANEOUS
Status: COMPLETED | OUTPATIENT
Start: 2023-08-23 | End: 2023-08-24

## 2023-08-23 RX ORDER — METRONIDAZOLE 500 MG/100ML
500 INJECTION, SOLUTION INTRAVENOUS ONCE
Status: COMPLETED | OUTPATIENT
Start: 2023-08-23 | End: 2023-08-23

## 2023-08-23 RX ORDER — DEXTROSE MONOHYDRATE 25 G/50ML
25 INJECTION, SOLUTION INTRAVENOUS
Status: DISCONTINUED | OUTPATIENT
Start: 2023-08-23 | End: 2023-08-25 | Stop reason: HOSPADM

## 2023-08-23 RX ORDER — ONDANSETRON 2 MG/ML
4 INJECTION INTRAMUSCULAR; INTRAVENOUS EVERY 4 HOURS PRN
Status: DISCONTINUED | OUTPATIENT
Start: 2023-08-23 | End: 2023-08-25 | Stop reason: HOSPADM

## 2023-08-23 RX ORDER — ONDANSETRON 2 MG/ML
4 INJECTION INTRAMUSCULAR; INTRAVENOUS ONCE
Status: COMPLETED | OUTPATIENT
Start: 2023-08-23 | End: 2023-08-23

## 2023-08-23 RX ORDER — HEPARIN SODIUM 5000 [USP'U]/ML
5000 INJECTION, SOLUTION INTRAVENOUS; SUBCUTANEOUS EVERY 8 HOURS
Status: DISCONTINUED | OUTPATIENT
Start: 2023-08-23 | End: 2023-08-24

## 2023-08-23 RX ORDER — ONDANSETRON 4 MG/1
4 TABLET, ORALLY DISINTEGRATING ORAL EVERY 4 HOURS PRN
Status: DISCONTINUED | OUTPATIENT
Start: 2023-08-23 | End: 2023-08-25 | Stop reason: HOSPADM

## 2023-08-23 RX ORDER — PROCHLORPERAZINE EDISYLATE 5 MG/ML
5-10 INJECTION INTRAMUSCULAR; INTRAVENOUS EVERY 4 HOURS PRN
Status: DISCONTINUED | OUTPATIENT
Start: 2023-08-23 | End: 2023-08-25 | Stop reason: HOSPADM

## 2023-08-23 RX ORDER — ATORVASTATIN CALCIUM 40 MG/1
40 TABLET, FILM COATED ORAL EVERY EVENING
Status: DISCONTINUED | OUTPATIENT
Start: 2023-08-23 | End: 2023-08-25 | Stop reason: HOSPADM

## 2023-08-23 RX ORDER — PROMETHAZINE HYDROCHLORIDE 25 MG/1
12.5-25 SUPPOSITORY RECTAL EVERY 4 HOURS PRN
Status: DISCONTINUED | OUTPATIENT
Start: 2023-08-23 | End: 2023-08-25 | Stop reason: HOSPADM

## 2023-08-23 RX ORDER — METRONIDAZOLE 500 MG/100ML
500 INJECTION, SOLUTION INTRAVENOUS EVERY 12 HOURS
Status: DISCONTINUED | OUTPATIENT
Start: 2023-08-24 | End: 2023-08-24

## 2023-08-23 RX ORDER — ASPIRIN 81 MG/1
81 TABLET ORAL DAILY
Status: DISCONTINUED | OUTPATIENT
Start: 2023-08-24 | End: 2023-08-25 | Stop reason: HOSPADM

## 2023-08-23 RX ORDER — BISACODYL 10 MG
10 SUPPOSITORY, RECTAL RECTAL
Status: DISCONTINUED | OUTPATIENT
Start: 2023-08-23 | End: 2023-08-25 | Stop reason: HOSPADM

## 2023-08-23 RX ORDER — ALBUTEROL SULFATE 90 UG/1
1-2 AEROSOL, METERED RESPIRATORY (INHALATION) EVERY 4 HOURS PRN
Status: DISCONTINUED | OUTPATIENT
Start: 2023-08-23 | End: 2023-08-25 | Stop reason: HOSPADM

## 2023-08-23 RX ORDER — ACETAMINOPHEN 325 MG/1
650 TABLET ORAL EVERY 6 HOURS PRN
Status: DISCONTINUED | OUTPATIENT
Start: 2023-08-23 | End: 2023-08-25 | Stop reason: HOSPADM

## 2023-08-23 RX ORDER — SODIUM CHLORIDE 9 MG/ML
1000 INJECTION, SOLUTION INTRAVENOUS ONCE
Status: COMPLETED | OUTPATIENT
Start: 2023-08-23 | End: 2023-08-23

## 2023-08-23 RX ORDER — PROMETHAZINE HYDROCHLORIDE 25 MG/1
12.5-25 TABLET ORAL EVERY 4 HOURS PRN
Status: DISCONTINUED | OUTPATIENT
Start: 2023-08-23 | End: 2023-08-25 | Stop reason: HOSPADM

## 2023-08-23 RX ORDER — SODIUM CHLORIDE, SODIUM LACTATE, POTASSIUM CHLORIDE, AND CALCIUM CHLORIDE .6; .31; .03; .02 G/100ML; G/100ML; G/100ML; G/100ML
30 INJECTION, SOLUTION INTRAVENOUS ONCE
Status: COMPLETED | OUTPATIENT
Start: 2023-08-23 | End: 2023-08-23

## 2023-08-23 RX ORDER — CEFTRIAXONE 2 G/1
2000 INJECTION, POWDER, FOR SOLUTION INTRAMUSCULAR; INTRAVENOUS ONCE
Status: COMPLETED | OUTPATIENT
Start: 2023-08-23 | End: 2023-08-23

## 2023-08-23 RX ORDER — POLYETHYLENE GLYCOL 3350 17 G/17G
1 POWDER, FOR SOLUTION ORAL
Status: DISCONTINUED | OUTPATIENT
Start: 2023-08-23 | End: 2023-08-25 | Stop reason: HOSPADM

## 2023-08-23 RX ORDER — AMOXICILLIN 250 MG
2 CAPSULE ORAL 2 TIMES DAILY
Status: DISCONTINUED | OUTPATIENT
Start: 2023-08-24 | End: 2023-08-25 | Stop reason: HOSPADM

## 2023-08-23 RX ADMIN — PROCHLORPERAZINE EDISYLATE 10 MG: 5 INJECTION INTRAMUSCULAR; INTRAVENOUS at 20:25

## 2023-08-23 RX ADMIN — ONDANSETRON 4 MG: 2 INJECTION INTRAMUSCULAR; INTRAVENOUS at 16:28

## 2023-08-23 RX ADMIN — HYDROMORPHONE HYDROCHLORIDE 0.5 MG: 1 INJECTION, SOLUTION INTRAMUSCULAR; INTRAVENOUS; SUBCUTANEOUS at 16:29

## 2023-08-23 RX ADMIN — METRONIDAZOLE 500 MG: 500 INJECTION, SOLUTION INTRAVENOUS at 16:30

## 2023-08-23 RX ADMIN — HEPARIN SODIUM 5000 UNITS: 5000 INJECTION, SOLUTION INTRAVENOUS; SUBCUTANEOUS at 21:33

## 2023-08-23 RX ADMIN — ATORVASTATIN CALCIUM 40 MG: 40 TABLET, FILM COATED ORAL at 18:47

## 2023-08-23 RX ADMIN — SODIUM CHLORIDE, POTASSIUM CHLORIDE, SODIUM LACTATE AND CALCIUM CHLORIDE 1914 ML: 600; 310; 30; 20 INJECTION, SOLUTION INTRAVENOUS at 14:23

## 2023-08-23 RX ADMIN — SODIUM CHLORIDE 1000 ML: 9 INJECTION, SOLUTION INTRAVENOUS at 14:11

## 2023-08-23 RX ADMIN — GADOTERIDOL 20 ML: 279.3 INJECTION, SOLUTION INTRAVENOUS at 17:56

## 2023-08-23 RX ADMIN — HYDROMORPHONE HYDROCHLORIDE 0.5 MG: 1 INJECTION, SOLUTION INTRAMUSCULAR; INTRAVENOUS; SUBCUTANEOUS at 20:25

## 2023-08-23 RX ADMIN — IOHEXOL 100 ML: 350 INJECTION, SOLUTION INTRAVENOUS at 14:58

## 2023-08-23 RX ADMIN — CEFTRIAXONE SODIUM 2000 MG: 2 INJECTION, POWDER, FOR SOLUTION INTRAMUSCULAR; INTRAVENOUS at 16:29

## 2023-08-23 ASSESSMENT — ENCOUNTER SYMPTOMS
VOMITING: 0
FEVER: 0
COUGH: 0
SHORTNESS OF BREATH: 0
ABDOMINAL PAIN: 0
HALLUCINATIONS: 0
HEADACHES: 0
FALLS: 0
DOUBLE VISION: 0
SENSORY CHANGE: 0
BACK PAIN: 1
DIARRHEA: 1
NAUSEA: 0
SORE THROAT: 0
BLURRED VISION: 0
PALPITATIONS: 0
CHILLS: 0

## 2023-08-23 ASSESSMENT — PAIN DESCRIPTION - PAIN TYPE
TYPE: ACUTE PAIN;CHRONIC PAIN
TYPE: ACUTE PAIN;CHRONIC PAIN

## 2023-08-23 ASSESSMENT — FIBROSIS 4 INDEX
FIB4 SCORE: 2.26
FIB4 SCORE: 5.81

## 2023-08-23 ASSESSMENT — PATIENT HEALTH QUESTIONNAIRE - PHQ9
SUM OF ALL RESPONSES TO PHQ9 QUESTIONS 1 AND 2: 0
1. LITTLE INTEREST OR PLEASURE IN DOING THINGS: NOT AT ALL
2. FEELING DOWN, DEPRESSED, IRRITABLE, OR HOPELESS: NOT AT ALL

## 2023-08-23 ASSESSMENT — LIFESTYLE VARIABLES: SUBSTANCE_ABUSE: 0

## 2023-08-23 NOTE — ED PROVIDER NOTES
"ER Provider Note    Scribed for Reginald Villa M.D. by Pritesh Barkley. 8/23/2023   1:21 PM    Primary Care Provider: Beth eWbber P.A.-C.    CHIEF COMPLAINT  Chief Complaint   Patient presents with    Back Pain    Weakness     Pt BIB ems for weakness to bilateral lower extremities. Pt's last known normal was 2330 last night. Pt woke up this AM at 1100 today feeling like \"jelly.\" Neuro intact. Aox4. Pt speaking in full sentences. Pt able to move all extremities. +CMS to all extremities. Pt describes having neck low back pain for last 6 weeks with movement. Current pain level at 5/10.      EXTERNAL RECORDS REVIEWED  Outpatient Notes shows that the patient had MRIs one week ago on 8/16/23. MRI of the lumbar spine was unremarkable. MRI of the thoracic spine shows Thoracic epidural lipomatosis, Mild chronic compression deformities at T8 and T9 with mild kyphosis, and Mild degenerative disease. There is mild central canal stenosis at T7-8 secondary to disc bulge and epidural lipomatosis. History of diabetes and hypertension. Patient was also seen in clinic on 7/26/23 for back pain. At that time, he was placed on a muscle relaxer and pain medication.      HPI/ROS  LIMITATION TO HISTORY   Select: : None  OUTSIDE HISTORIAN(S):  EMS who helped contribute to the patient's history     Zuhair Jj is a 52 y.o. male who presents to the ED for evaluation of acute lower back pain onset about 6 weeks ago. Patient sought further care for his symptoms with his PCP. He was prescribed muscle relaxers at that time, and had MRIs ordered. Patient completed MRIs on 8/16/23 of his lumbar and thoracic spine. Since onset, he notes no improvement with his pain, despite medications. Today, patient states that when he woke up this morning at around 11 AM, and he felt like \"jelly.\" He \"could not function and couldn't yell or anything.\" He states that he finally got himself up and showered, but still felt overall weak. Patient " "reports that he has never experienced symptoms like this before and was concerned that he may be having a stroke, prompting him to call EMS and present him to the ED today. Currently in the ED, patient rates his pain a 5/10. Patient has associated neck pain, bilateral leg weakness, and generalized weakness. He also reports having abdominal pain with diarrhea for the past few months. Denies any fevers, decreased sensation, nausea, vomiting, runny nose, cough, sore throat, dysuria, hematuria, or blood in his stool. Pain is exacerbated with movement. Drug allergies to Penicillins.    PAST MEDICAL HISTORY  Past Medical History:   Diagnosis Date    Acute bilateral low back pain with bilateral sciatica 7/14/2023    Allergy     Arrhythmia     Asthma     Chest pain     Close exposure to COVID-19 virus 08/06/2020    Diabetes (HCC)     Dyspnea on exertion 03/24/2020    Heart murmur     High cholesterol 02/24/2023    Hyperlipidemia     Hypertension     Overweight     Rheumatic fever with cardiac involvement     patient cant recall but thinks there was a \"tear\" in his heart    Sleep apnea 02/24/2023    Sprain of medial collateral ligament of left knee 06/05/2019       SURGICAL HISTORY  Past Surgical History:   Procedure Laterality Date    OTHER      jaw reconstruction       FAMILY HISTORY  Family History   Problem Relation Age of Onset    Heart Failure Mother 68        cause of death    Heart Disease Mother     Hypertension Mother     Kidney cancer Father 52        cause of death    No Known Problems Brother     No Known Problems Brother     Cerebral aneurysm Maternal Grandfather 54        cause of death    Hypertension Maternal Grandfather     No Known Problems Child        SOCIAL HISTORY   reports that he has never smoked. He has never used smokeless tobacco. He reports that he does not drink alcohol and does not use drugs.    CURRENT MEDICATIONS  Previous Medications    ASPIRIN 81 MG EC TABLET    Take 81 mg by mouth every " "day at 6 PM.    ATORVASTATIN (LIPITOR) 40 MG TAB    TAKE ONE TABLET BY MOUTH EVERY EVENING    DICLOFENAC DR (VOLTAREN) 75 MG TABLET DELAYED RESPONSE    Take 1 Tablet by mouth 2 times a day.    LISINOPRIL-HYDROCHLOROTHIAZIDE (PRINZIDE) 20-25 MG PER TABLET    Take 1 Tablet by mouth every day.    METFORMIN ER (GLUCOPHAGE XR) 750 MG TABLET SR 24 HR    TAKE ONE TABLET BY MOUTH DAILY    PROAIR  (90 BASE) MCG/ACT AERO SOLN INHALATION AEROSOL    Inhale 1-2 Puffs every four hours as needed for Shortness of Breath.    QVAR REDIHALER 80 MCG/ACT INHALER    Inhale 1 Puff 2 times a day.    TIZANIDINE (ZANAFLEX) 4 MG TAB    Take 1 Tablet by mouth 3 times a day.    TRAMADOL (ULTRAM) 50 MG TAB    Take 1 Tablet by mouth every four hours as needed for Severe Pain for up to 10 days.    VITAMIN D PO    Take  by mouth.       ALLERGIES  Allergies   Allergen Reactions    Pcn [Penicillins] Anaphylaxis        PHYSICAL EXAM  /60   Pulse 82   Temp 37.2 °C (99 °F)   Resp 18   Ht 1.676 m (5' 6\")   Wt 99.8 kg (220 lb)   SpO2 91%   BMI 35.51 kg/m²    Constitutional: Well developed, Well nourished, Moderate distress  HENT: Normocephalic, Atraumatic. Dry mucous membranes   Eyes: PERRLA, EOMI, Conjunctiva normal, No discharge.   Neck: No tenderness, Supple, No stridor.   Cardiovascular: Normal heart rate, Normal rhythm.   Thorax & Lungs: Clear to auscultation bilaterally, No respiratory distress, No wheezing, No crackles.   Abdomen: Soft, Mild diffuse abdominal tenderness, No masses, No pulsatile masses.   Skin: Warm, Dry, No erythema, No rash.    Musculoskeletal: No major deformities noted.  Intact distal pulses  Neurologic: Awake, alert. Slightly slow to respond. Moderate, diffuse weakness approximately 3/5 strength throughout, not unilateral.   Psychiatric: Affect normal, Judgment normal, Mood normal.      DIAGNOSTIC STUDIES    Labs:   Results for orders placed or performed during the hospital encounter of 08/23/23   CBC WITH " DIFFERENTIAL   Result Value Ref Range    WBC 7.6 4.8 - 10.8 K/uL    RBC 3.95 (L) 4.70 - 6.10 M/uL    Hemoglobin 13.3 (L) 14.0 - 18.0 g/dL    Hematocrit 38.7 (L) 42.0 - 52.0 %    MCV 98.0 (H) 81.4 - 97.8 fL    MCH 33.7 (H) 27.0 - 33.0 pg    MCHC 34.4 32.3 - 36.5 g/dL    RDW 45.8 35.9 - 50.0 fL    Platelet Count 173 164 - 446 K/uL    MPV 8.7 (L) 9.0 - 12.9 fL    Neutrophils-Polys 56.40 44.00 - 72.00 %    Lymphocytes 22.50 22.00 - 41.00 %    Monocytes 15.50 (H) 0.00 - 13.40 %    Eosinophils 4.20 0.00 - 6.90 %    Basophils 0.90 0.00 - 1.80 %    Immature Granulocytes 0.50 0.00 - 0.90 %    Nucleated RBC 0.00 0.00 - 0.20 /100 WBC    Neutrophils (Absolute) 4.25 1.82 - 7.42 K/uL    Lymphs (Absolute) 1.70 1.00 - 4.80 K/uL    Monos (Absolute) 1.17 (H) 0.00 - 0.85 K/uL    Eos (Absolute) 0.32 0.00 - 0.51 K/uL    Baso (Absolute) 0.07 0.00 - 0.12 K/uL    Immature Granulocytes (abs) 0.04 0.00 - 0.11 K/uL    NRBC (Absolute) 0.00 K/uL   COMP METABOLIC PANEL   Result Value Ref Range    Sodium 131 (L) 135 - 145 mmol/L    Potassium 4.6 3.6 - 5.5 mmol/L    Chloride 93 (L) 96 - 112 mmol/L    Co2 17 (L) 20 - 33 mmol/L    Anion Gap 21.0 (H) 7.0 - 16.0    Glucose 123 (H) 65 - 99 mg/dL    Bun 16 8 - 22 mg/dL    Creatinine 1.87 (H) 0.50 - 1.40 mg/dL    Calcium 9.3 8.5 - 10.5 mg/dL    Correct Calcium 9.1 8.5 - 10.5 mg/dL    AST(SGOT) 431 (H) 12 - 45 U/L    ALT(SGPT) 536 (H) 2 - 50 U/L    Alkaline Phosphatase 95 30 - 99 U/L    Total Bilirubin 0.7 0.1 - 1.5 mg/dL    Albumin 4.3 3.2 - 4.9 g/dL    Total Protein 7.0 6.0 - 8.2 g/dL    Globulin 2.7 1.9 - 3.5 g/dL    A-G Ratio 1.6 g/dL   Sed Rate   Result Value Ref Range    Sed Rate Westergren 22 (H) 0 - 20 mm/hour   CRP QUANTITIVE (NON-CARDIAC)   Result Value Ref Range    Stat C-Reactive Protein 1.36 (H) 0.00 - 0.75 mg/dL   LACTIC ACID   Result Value Ref Range    Lactic Acid 4.1 (HH) 0.5 - 2.0 mmol/L   URINALYSIS    Specimen: Urine   Result Value Ref Range    Color Yellow     Character Clear      Specific Gravity 1.012 <1.035    Ph 6.0 5.0 - 8.0    Glucose 250 (A) Negative mg/dL    Ketones Negative Negative mg/dL    Protein Negative Negative mg/dL    Bilirubin Negative Negative    Urobilinogen, Urine 0.2 Negative    Nitrite Negative Negative    Leukocyte Esterase Negative Negative    Occult Blood Negative Negative    Micro Urine Req see below    CoV-2, FLU A/B, and RSV by PCR (2-4 Hours CEPHEID) : Collect NP swab in VTM    Specimen: Respirate   Result Value Ref Range    Influenza virus A RNA Negative Negative    Influenza virus B, PCR Negative Negative    RSV, PCR Negative Negative    SARS-CoV-2 by PCR NotDetected     SARS-CoV-2 Source NP Swab    ESTIMATED GFR   Result Value Ref Range    GFR (CKD-EPI) 42 (A) >60 mL/min/1.73 m 2       EKG:   I have independently interpreted this EKG as detailed above.     Radiology:   This attending emergency physician has independently interpreted the diagnostic imaging associated with this visit and is awaiting the final reading from the radiologist.   Preliminary interpretation is a follows: Questionable cholecystitis  Radiologist interpretation:   US-RUQ   Final Result      1.  Hepatomegaly.   2.  Hepatic steatosis and/or diffuse hepatocellular disease.   3.  Dilated main portal vein suggesting portal hypertension.   4.  Nonspecific gallbladder wall thickening. This finding can be seen in the setting of liver disease. No discrete sonographic evidence of acute cholecystitis.      INTERPRETING LOCATION: [address] [place] [address], [facility]      CT-ABDOMEN-PELVIS WITH   Final Result      1.  No evidence of acute inflammatory process in the abdomen or pelvis   2.  Colonic diverticulosis   3.  Hepatic steatosis      DX-CHEST-PORTABLE (1 VIEW)   Final Result      No acute cardiac or pulmonary abnormalities are identified.      MR-THORACIC SPINE-WITH    (Results Pending)   MR-LUMBAR SPINE-WITH    (Results Pending)   MR-CERVICAL SPINE-WITH    (Results Pending)        COURSE & MEDICAL  DECISION MAKING     ED Observation Status? No; Patient does not meet criteria for ED Observation.     INITIAL ASSESSMENT, COURSE AND PLAN  Care Narrative: Patient coming in secondary to increasing weakness.  The patient's been having pain and some weakness starting in the lumbar area progressing to the thoracic area in the cervical area.  The patient has also been having abdominal pains for the last month on the left with associated diarrhea.  Abdominal examination shows moderate diffuse abdominal tenderness, neurologic evaluation shows moderate diffuse weakness symmetrical.  Differential diagnosis includes epidural abscess, discitis, intra-abdominal, sepsis, metabolic.  Initial laboratory test show the patient has a increased lactic acid therefore we gave the patient fluid resuscitation with 30 cc/kg of LR.  Patient's liver function tests are elevated along with elevated ESR and CRP therefore give the patient antibiotics.  Originally wrote for MRIs of the cervical thoracic and lumbar area however radiology called me and was concerned about those given the patient's renal function and his recent MRIs at that time I felt the patient likely had cholecystitis.  CT scan was negative, ultrasound was negative.  Now I will order the MRIs to be done to make sure the patient does not have an epidural abscess.  Case has been turned over to my partner to review the MRIs, I will discuss the case with the hospitalist for hospitalization.    1:21 PM - Patient was seen and evaluated at bedside. Patient presents to the ED for back pain that started about six weeks ago. After my exam, I discussed with the patient the plan of care, which includes treating the patient with medication for their symptoms, as well as obtaining lab work and imaging for further evaluation. Patient understands and verbalizes agreement to plan of care. Patient will be treated with Dilaudid 0.5 mg, Zofran 4 mg, and NS infusion bolus 1000 mL for his symptoms.  Ordered CT-abdomen pelvis with, MR-Cervical Spine with and without, DX-chest, MR-Thoracic spine with, MR-lumbar spine with, lactic acid, UA, urine culture, Blood culture, CBC with diff, CMP, Sed rate, and CRP Quantitive to evaluate.      2:17 PM - Patient will be treated with lactated ringers infusion for his symptoms.     HYDRATION: Based on the patient's presentation of Sepsis the patient was given IV fluids. IV Hydration was used because oral hydration was not adequate alone. Upon recheck following hydration, the patient was improved.    DISPOSITION AND DISCUSSIONS    I have discussed management of the patient with the following physicians and VONNIE's:  Hospitalist    CRITICAL CARE  The very real possibilty of a deterioration of this patient's condition required the highest level of my preparedness for sudden, emergent intervention.  I provided critical care services, which included medication orders, frequent reevaluations of the patient's condition and response to treatment, ordering and reviewing test results, and discussing the case with various consultants.  The critical care time associated with the care of the patient was 40 minutes. Review chart for interventions. This time is exclusive of any other billable procedures.     DISPOSITION:  Patient will be hospitalized by hospitalist in guarded condition.      FINAL DIAGNOSIS  1. Sepsis with acute renal failure without septic shock, due to unspecified organism, unspecified acute renal failure type (HCC)    2. Acute midline low back pain without sciatica    3. Weakness         Pritesh PEREZ (Scribe), am scribing for, and in the presence of, Reginald Villa M.D..    Electronically signed by: Pritesh Barkley (Mita), 8/23/2023    Reginald PEREZ M.D. personally performed the services described in this documentation, as scribed by Pritesh Barkley in my presence, and it is both accurate and complete.      The note accurately reflects work and  decisions made by me.  Reginald Villa M.D.  8/23/2023  4:34 PM

## 2023-08-23 NOTE — ED NOTES
Pt provided urine sample. UA sent. Pt denies needing further nursing interventions at this time. Call light within reach.

## 2023-08-23 NOTE — ED TRIAGE NOTES
"Chief Complaint   Patient presents with    Back Pain    Weakness     Pt BIB ems for weakness to bilateral lower extremities. Pt's last known normal was 2330 last night. Pt woke up this AM at 1100 today feeling like \"jelly.\" Neuro intact. Aox4. Pt speaking in full sentences. Pt able to move all extremities. +CMS to all extremities. Pt describes having neck low back pain for last 6 weeks with movement. Current pain level at 5/10.      BP (P) 100/60   Pulse (P) 82   Temp (P) 37.2 °C (99 °F)   Resp (P) 18   Ht (P) 1.676 m (5' 6\")   Wt (P) 99.8 kg (220 lb)   SpO2 (P) 91%   BMI (P) 35.51 kg/m²     Pt changed into gown and placed on monitors. Call light within reach. Blood drawn and sent to lab. Family at bedside. Charted for ERP.   "

## 2023-08-24 PROBLEM — R55 NEAR SYNCOPE: Status: ACTIVE | Noted: 2023-08-24

## 2023-08-24 LAB
ALBUMIN SERPL BCP-MCNC: 4.1 G/DL (ref 3.2–4.9)
ALBUMIN/GLOB SERPL: 1.6 G/DL
ALP SERPL-CCNC: 71 U/L (ref 30–99)
ALT SERPL-CCNC: 472 U/L (ref 2–50)
ANION GAP SERPL CALC-SCNC: 14 MMOL/L (ref 7–16)
AST SERPL-CCNC: 359 U/L (ref 12–45)
BASOPHILS # BLD AUTO: 0.8 % (ref 0–1.8)
BASOPHILS # BLD: 0.05 K/UL (ref 0–0.12)
BILIRUB SERPL-MCNC: 1 MG/DL (ref 0.1–1.5)
BUN SERPL-MCNC: 19 MG/DL (ref 8–22)
CALCIUM ALBUM COR SERPL-MCNC: 9.1 MG/DL (ref 8.5–10.5)
CALCIUM SERPL-MCNC: 9.2 MG/DL (ref 8.5–10.5)
CHLORIDE SERPL-SCNC: 92 MMOL/L (ref 96–112)
CO2 SERPL-SCNC: 25 MMOL/L (ref 20–33)
CORTIS SERPL-MCNC: 13.5 UG/DL (ref 0–23)
CREAT SERPL-MCNC: 1.55 MG/DL (ref 0.5–1.4)
EOSINOPHIL # BLD AUTO: 0.2 K/UL (ref 0–0.51)
EOSINOPHIL NFR BLD: 3.3 % (ref 0–6.9)
ERYTHROCYTE [DISTWIDTH] IN BLOOD BY AUTOMATED COUNT: 46.9 FL (ref 35.9–50)
GFR SERPLBLD CREATININE-BSD FMLA CKD-EPI: 53 ML/MIN/1.73 M 2
GLOBULIN SER CALC-MCNC: 2.6 G/DL (ref 1.9–3.5)
GLUCOSE BLD STRIP.AUTO-MCNC: 126 MG/DL (ref 65–99)
GLUCOSE BLD STRIP.AUTO-MCNC: 126 MG/DL (ref 65–99)
GLUCOSE BLD STRIP.AUTO-MCNC: 127 MG/DL (ref 65–99)
GLUCOSE BLD STRIP.AUTO-MCNC: 131 MG/DL (ref 65–99)
GLUCOSE SERPL-MCNC: 105 MG/DL (ref 65–99)
HAV IGM SERPL QL IA: NORMAL
HBV CORE IGM SER QL: NORMAL
HBV SURFACE AG SER QL: NORMAL
HCT VFR BLD AUTO: 36.1 % (ref 42–52)
HCV AB SER QL: NORMAL
HETEROPH AB SER QL: NEGATIVE
HGB BLD-MCNC: 12.5 G/DL (ref 14–18)
IMM GRANULOCYTES # BLD AUTO: 0.03 K/UL (ref 0–0.11)
IMM GRANULOCYTES NFR BLD AUTO: 0.5 % (ref 0–0.9)
LYMPHOCYTES # BLD AUTO: 0.94 K/UL (ref 1–4.8)
LYMPHOCYTES NFR BLD: 15.3 % (ref 22–41)
MAGNESIUM SERPL-MCNC: 2 MG/DL (ref 1.5–2.5)
MCH RBC QN AUTO: 33.7 PG (ref 27–33)
MCHC RBC AUTO-ENTMCNC: 34.6 G/DL (ref 32.3–36.5)
MCV RBC AUTO: 97.3 FL (ref 81.4–97.8)
MONOCYTES # BLD AUTO: 0.67 K/UL (ref 0–0.85)
MONOCYTES NFR BLD AUTO: 10.9 % (ref 0–13.4)
NEUTROPHILS # BLD AUTO: 4.24 K/UL (ref 1.82–7.42)
NEUTROPHILS NFR BLD: 69.2 % (ref 44–72)
NRBC # BLD AUTO: 0 K/UL
NRBC BLD-RTO: 0 /100 WBC (ref 0–0.2)
NT-PROBNP SERPL IA-MCNC: 63 PG/ML (ref 0–125)
PHOSPHATE SERPL-MCNC: 3.6 MG/DL (ref 2.5–4.5)
PLATELET # BLD AUTO: 135 K/UL (ref 164–446)
PMV BLD AUTO: 8.8 FL (ref 9–12.9)
POTASSIUM SERPL-SCNC: 4.4 MMOL/L (ref 3.6–5.5)
PROT SERPL-MCNC: 6.7 G/DL (ref 6–8.2)
RBC # BLD AUTO: 3.71 M/UL (ref 4.7–6.1)
SODIUM SERPL-SCNC: 131 MMOL/L (ref 135–145)
TROPONIN T SERPL-MCNC: 7 NG/L (ref 6–19)
WBC # BLD AUTO: 6.1 K/UL (ref 4.8–10.8)

## 2023-08-24 PROCEDURE — 99233 SBSQ HOSP IP/OBS HIGH 50: CPT | Performed by: HOSPITALIST

## 2023-08-24 PROCEDURE — 83880 ASSAY OF NATRIURETIC PEPTIDE: CPT

## 2023-08-24 PROCEDURE — 86308 HETEROPHILE ANTIBODY SCREEN: CPT

## 2023-08-24 PROCEDURE — 700105 HCHG RX REV CODE 258: Performed by: HOSPITALIST

## 2023-08-24 PROCEDURE — 700102 HCHG RX REV CODE 250 W/ 637 OVERRIDE(OP): Performed by: INTERNAL MEDICINE

## 2023-08-24 PROCEDURE — 82962 GLUCOSE BLOOD TEST: CPT | Mod: 91

## 2023-08-24 PROCEDURE — 80053 COMPREHEN METABOLIC PANEL: CPT

## 2023-08-24 PROCEDURE — 83735 ASSAY OF MAGNESIUM: CPT

## 2023-08-24 PROCEDURE — 700102 HCHG RX REV CODE 250 W/ 637 OVERRIDE(OP): Performed by: HOSPITALIST

## 2023-08-24 PROCEDURE — 84484 ASSAY OF TROPONIN QUANT: CPT

## 2023-08-24 PROCEDURE — 82533 TOTAL CORTISOL: CPT

## 2023-08-24 PROCEDURE — 84100 ASSAY OF PHOSPHORUS: CPT

## 2023-08-24 PROCEDURE — 80074 ACUTE HEPATITIS PANEL: CPT

## 2023-08-24 PROCEDURE — 94660 CPAP INITIATION&MGMT: CPT

## 2023-08-24 PROCEDURE — A9270 NON-COVERED ITEM OR SERVICE: HCPCS | Performed by: INTERNAL MEDICINE

## 2023-08-24 PROCEDURE — 700111 HCHG RX REV CODE 636 W/ 250 OVERRIDE (IP): Performed by: INTERNAL MEDICINE

## 2023-08-24 PROCEDURE — 770020 HCHG ROOM/CARE - TELE (206)

## 2023-08-24 PROCEDURE — 700111 HCHG RX REV CODE 636 W/ 250 OVERRIDE (IP): Performed by: EMERGENCY MEDICINE

## 2023-08-24 PROCEDURE — 700101 HCHG RX REV CODE 250: Performed by: INTERNAL MEDICINE

## 2023-08-24 PROCEDURE — A9270 NON-COVERED ITEM OR SERVICE: HCPCS | Performed by: HOSPITALIST

## 2023-08-24 PROCEDURE — 85025 COMPLETE CBC W/AUTO DIFF WBC: CPT

## 2023-08-24 RX ORDER — SODIUM CHLORIDE 9 MG/ML
INJECTION, SOLUTION INTRAVENOUS CONTINUOUS
Status: DISCONTINUED | OUTPATIENT
Start: 2023-08-24 | End: 2023-08-25

## 2023-08-24 RX ORDER — OXYCODONE HYDROCHLORIDE 5 MG/1
5 TABLET ORAL EVERY 6 HOURS PRN
Status: DISCONTINUED | OUTPATIENT
Start: 2023-08-24 | End: 2023-08-25 | Stop reason: HOSPADM

## 2023-08-24 RX ORDER — TRAMADOL HYDROCHLORIDE 50 MG/1
50 TABLET ORAL EVERY 6 HOURS PRN
Status: DISCONTINUED | OUTPATIENT
Start: 2023-08-24 | End: 2023-08-25 | Stop reason: HOSPADM

## 2023-08-24 RX ADMIN — METRONIDAZOLE 500 MG: 500 INJECTION, SOLUTION INTRAVENOUS at 05:46

## 2023-08-24 RX ADMIN — ATORVASTATIN CALCIUM 40 MG: 40 TABLET, FILM COATED ORAL at 17:18

## 2023-08-24 RX ADMIN — HYDROMORPHONE HYDROCHLORIDE 0.5 MG: 1 INJECTION, SOLUTION INTRAMUSCULAR; INTRAVENOUS; SUBCUTANEOUS at 01:52

## 2023-08-24 RX ADMIN — RIVAROXABAN 10 MG: 10 TABLET, FILM COATED ORAL at 17:18

## 2023-08-24 RX ADMIN — CEFTRIAXONE SODIUM 2000 MG: 10 INJECTION, POWDER, FOR SOLUTION INTRAVENOUS at 05:15

## 2023-08-24 RX ADMIN — ONDANSETRON 4 MG: 2 INJECTION INTRAMUSCULAR; INTRAVENOUS at 01:51

## 2023-08-24 RX ADMIN — ASPIRIN 81 MG: 81 TABLET, COATED ORAL at 05:14

## 2023-08-24 RX ADMIN — SODIUM CHLORIDE: 9 INJECTION, SOLUTION INTRAVENOUS at 10:33

## 2023-08-24 RX ADMIN — HEPARIN SODIUM 5000 UNITS: 5000 INJECTION, SOLUTION INTRAVENOUS; SUBCUTANEOUS at 05:14

## 2023-08-24 ASSESSMENT — PAIN DESCRIPTION - PAIN TYPE
TYPE: ACUTE PAIN
TYPE: ACUTE PAIN;CHRONIC PAIN
TYPE: ACUTE PAIN
TYPE: ACUTE PAIN;CHRONIC PAIN

## 2023-08-24 ASSESSMENT — ENCOUNTER SYMPTOMS
NAUSEA: 0
BACK PAIN: 1
DIARRHEA: 1
FEVER: 0
HEADACHES: 0
SORE THROAT: 0
SHORTNESS OF BREATH: 0
SENSORY CHANGE: 0
COUGH: 0
PALPITATIONS: 0
NERVOUS/ANXIOUS: 0
EYE DISCHARGE: 0
CHILLS: 0
SPEECH CHANGE: 0
STRIDOR: 0
DIZZINESS: 0
ABDOMINAL PAIN: 0

## 2023-08-24 ASSESSMENT — LIFESTYLE VARIABLES
ALCOHOL_USE: NO
ON A TYPICAL DAY WHEN YOU DRINK ALCOHOL HOW MANY DRINKS DO YOU HAVE: 0
TOTAL SCORE: 0
DOES PATIENT WANT TO STOP DRINKING: NO
TOTAL SCORE: 0
HAVE YOU EVER FELT YOU SHOULD CUT DOWN ON YOUR DRINKING: NO
EVER HAD A DRINK FIRST THING IN THE MORNING TO STEADY YOUR NERVES TO GET RID OF A HANGOVER: NO
HOW MANY TIMES IN THE PAST YEAR HAVE YOU HAD 5 OR MORE DRINKS IN A DAY: 0
AVERAGE NUMBER OF DAYS PER WEEK YOU HAVE A DRINK CONTAINING ALCOHOL: 0
HAVE PEOPLE ANNOYED YOU BY CRITICIZING YOUR DRINKING: NO
CONSUMPTION TOTAL: NEGATIVE
TOTAL SCORE: 0
EVER FELT BAD OR GUILTY ABOUT YOUR DRINKING: NO
SUBSTANCE_ABUSE: 0

## 2023-08-24 ASSESSMENT — COGNITIVE AND FUNCTIONAL STATUS - GENERAL
SUGGESTED CMS G CODE MODIFIER DAILY ACTIVITY: CH
DAILY ACTIVITIY SCORE: 24
SUGGESTED CMS G CODE MODIFIER MOBILITY: CH
MOBILITY SCORE: 24

## 2023-08-24 ASSESSMENT — FIBROSIS 4 INDEX: FIB4 SCORE: 6.61

## 2023-08-24 NOTE — CARE PLAN
The patient is Watcher - Medium risk of patient condition declining or worsening    Shift Goals  Clinical Goals: Stable hemodynamics  Patient Goals: Pain control  Family Goals: Updates    Progress made toward(s) clinical / shift goals:    Problem: Pain - Standard  Goal: Alleviation of pain or a reduction in pain to the patient’s comfort goal  Outcome: Progressing Patient educated to pain scale system. Patient encouraged to verbalize discomfort. Patient taught about non-pharmacological pain management. Patient is comfortable at this time without statements of discomfort or pain.     Problem: Knowledge Deficit - Standard  Goal: Patient and family/care givers will demonstrate understanding of plan of care, disease process/condition, diagnostic tests and medications  Outcome: Progressing Patient is educated of disease process and condition. Treatment team has included patient in plan of care. All medications indications and side effects are explained. Patient is encouraged to ask questions. Patient indicates understanding.     Problem: Hemodynamics  Goal: Patient's hemodynamics, fluid balance and neurologic status will be stable or improve  Outcome: Progressing      Problem: Respiratory  Goal: Patient will achieve/maintain optimum respiratory ventilation and gas exchange  Outcome: Progressing Patient respiratory status assessed. Patient educated on importance of coughing and deep breathing. Deep breaths are encouraged. Educated on how ambulation and movement can affect respiratory status. Patient indicates understanding.         Patient is not progressing towards the following goals:

## 2023-08-24 NOTE — PROGRESS NOTES
4 Eyes Skin Assessment Completed by MOODY Easley and MOODY Farrell.    Head WDL  Ears WDL  Nose WDL  Mouth WDL  Neck WDL  Breast/Chest WDL  Shoulder Blades WDL  Spine WDL  (R) Arm/Elbow/Hand WDL  (L) Arm/Elbow/Hand Bruising on upper arm  Abdomen WDL  Groin WDL  Scrotum/Coccyx/Buttocks Redness and Blanching  (R) Leg small Scabs along leg  (L) Leg WDL  (R) Heel/Foot/Toe WDL  (L) Heel/Foot/Toe WDL          Devices In Places ECG, Blood Pressure Cuff, and Pulse Ox      Interventions In Place Gray Ear Foams, Pillows, and Low Air Loss Mattress    Possible Skin Injury No    Pictures Uploaded Into Epic N/A  Wound Consult Placed N/A  RN Wound Prevention Protocol Ordered No

## 2023-08-24 NOTE — ASSESSMENT & PLAN NOTE
8/24 Cr 1.55 <1.87  Baseline 0.6  No hydro on CT  IV fluids to be continued  I will order for 8/25 am BMP  Esr and crp minimally elevated.

## 2023-08-24 NOTE — ED NOTES
Med Rec complete per discussion with patient and spouse   Allergies reviewed: PCN allergy  No oral antibiotics in the last 30 days    Patient reports recently stopping several medications (diclofenac, tizanidine, tramadol) that were prescribed for pain but not working.     Patient reports only using Proair and Qvar inhalers as needed, reports last doses of both being about a week ago.     Patient reports intermittent use of an OTC store brand decongestant. Patient unaware of name of decongestant.

## 2023-08-24 NOTE — PROGRESS NOTES
Hospital Medicine Daily Progress Note    Date of Service  8/24/2023    Chief Complaint  Back pain and weakness    Hospital Course  Zuhair Jj is a 52 y.o. male  with obesity, HTN, mixed hyperlipidemia, MAGDALENA, ablation (22023), DMII.  He was admitted 8/23/2023 with generalized weakness with a near syncopal episode and visual disturbances.  In the ED his BP was in the 80's.  He received IV fluids with improvement in BP.  A lactic acid:4.1, Na:131, bicar:17, elevated LFT's, and Cr:1.87.  The CT abd/pelvis and MRI spine was without acute finding.    Interval Problem Update  8/24/23: Cr:1.55. LA:4.3.  He denies any great loss of urine nor excessive diarrhea.  He denies any recent illnesses nor family that are sick.  No new meds.  He has not been taking any OTC meds.  He is feeling better today.  On RA @93%.  No abdominal pain. States chronic loose daily stool from metformin.     I have discussed this patient's plan of care and discharge plan at IDT rounds today with Case Management, Nursing, Nursing leadership, and other members of the IDT team.      Code Status  Full Code    Disposition  The patient is not medically cleared for discharge to home or a post-acute facility.  Anticipate discharge to: home with close outpatient follow-up    I have placed the appropriate orders for post-discharge needs.    Review of Systems  Review of Systems   Constitutional:  Negative for chills, fever and malaise/fatigue.   HENT:  Negative for sore throat.    Eyes:  Negative for discharge.   Respiratory:  Negative for cough, shortness of breath and stridor.    Cardiovascular:  Negative for chest pain, palpitations and leg swelling.   Gastrointestinal:  Positive for diarrhea. Negative for abdominal pain and nausea.   Genitourinary:  Negative for dysuria, frequency and hematuria.   Musculoskeletal:  Positive for back pain. Negative for joint pain.   Skin:  Negative for rash.   Neurological:  Negative for dizziness, sensory change, speech  change and headaches.   Psychiatric/Behavioral:  Negative for substance abuse. The patient is not nervous/anxious.         Physical Exam  Temp:  [36.8 °C (98.2 °F)-37.2 °C (99 °F)] 36.9 °C (98.5 °F)  Pulse:  [] 95  Resp:  [16-25] 17  BP: ()/(48-73) 123/63  SpO2:  [89 %-98 %] 93 %    Physical Exam  Vitals reviewed.   Constitutional:       Appearance: Normal appearance. He is obese. He is not diaphoretic.   HENT:      Head: Normocephalic and atraumatic.      Nose: Nose normal.      Mouth/Throat:      Mouth: Mucous membranes are moist.      Pharynx: No oropharyngeal exudate.   Eyes:      General: No scleral icterus.        Right eye: No discharge.         Left eye: No discharge.      Extraocular Movements: Extraocular movements intact.      Conjunctiva/sclera: Conjunctivae normal.   Cardiovascular:      Rate and Rhythm: Normal rate and regular rhythm.      Pulses:           Radial pulses are 2+ on the right side and 2+ on the left side.        Dorsalis pedis pulses are 2+ on the right side and 2+ on the left side.      Heart sounds: No murmur heard.  Pulmonary:      Effort: Pulmonary effort is normal. No respiratory distress.      Breath sounds: Normal breath sounds. No wheezing or rales.   Abdominal:      General: Bowel sounds are normal. There is no distension.      Palpations: Abdomen is soft.      Tenderness: There is no abdominal tenderness.   Musculoskeletal:         General: No swelling or tenderness.      Cervical back: No tenderness. No muscular tenderness.      Right lower leg: No edema.      Left lower leg: No edema.   Lymphadenopathy:      Cervical: No cervical adenopathy.   Skin:     General: Skin is dry.      Coloration: Skin is not jaundiced or pale.   Neurological:      General: No focal deficit present.      Mental Status: He is alert and oriented to person, place, and time. Mental status is at baseline.      Cranial Nerves: No cranial nerve deficit.      Sensory: No sensory deficit.       Coordination: Coordination normal.   Psychiatric:         Mood and Affect: Mood normal.         Behavior: Behavior normal.         Fluids    Intake/Output Summary (Last 24 hours) at 8/24/2023 1216  Last data filed at 8/24/2023 0900  Gross per 24 hour   Intake 3844.82 ml   Output --   Net 3844.82 ml       Laboratory  Recent Labs     08/23/23  1255 08/24/23  0530   WBC 7.6 6.1   RBC 3.95* 3.71*   HEMOGLOBIN 13.3* 12.5*   HEMATOCRIT 38.7* 36.1*   MCV 98.0* 97.3   MCH 33.7* 33.7*   MCHC 34.4 34.6   RDW 45.8 46.9   PLATELETCT 173 135*   MPV 8.7* 8.8*     Recent Labs     08/23/23  1255 08/24/23  0530   SODIUM 131* 131*   POTASSIUM 4.6 4.4   CHLORIDE 93* 92*   CO2 17* 25   GLUCOSE 123* 105*   BUN 16 19   CREATININE 1.87* 1.55*   CALCIUM 9.3 9.2                   Imaging  MR-CERVICAL SPINE-WITH & W/O   Final Result      1.  Normal MRI scan of the cervical cord and spine with and without gadolinium enhancement. No evidence of epidural abscess.      MR-LUMBAR SPINE-WITH   Final Result         MRI with contrast does not demonstrate any abnormal enhancement in the lumbar spine.      MR-THORACIC SPINE-WITH   Final Result         No abnormal enhancement is identified on the postcontrast sequences to suggest an epidural abscess.            US-RUQ   Final Result      1.  Hepatomegaly.   2.  Hepatic steatosis and/or diffuse hepatocellular disease.   3.  Dilated main portal vein suggesting portal hypertension.   4.  Nonspecific gallbladder wall thickening. This finding can be seen in the setting of liver disease. No discrete sonographic evidence of acute cholecystitis.      INTERPRETING LOCATION: [address] [place] [address], [address]      CT-ABDOMEN-PELVIS WITH   Final Result      1.  No evidence of acute inflammatory process in the abdomen or pelvis   2.  Colonic diverticulosis   3.  Hepatic steatosis      DX-CHEST-PORTABLE (1 VIEW)   Final Result      No acute cardiac or pulmonary abnormalities are identified.      EC-ECHOCARDIOGRAM  COMPLETE W/O CONT    (Results Pending)        Assessment/Plan  * Hypotension- (present on admission)  Assessment & Plan  Improved with fluids  Denies overt dehydration.  CHeck BNP, troponin, and echocardiogram  Monitor on telemetry  No leukocytosis, afebrile, no tachycardia. Stopped antibiotics 8/24  SBP as low as 85/51 in ED  Fluid responsive to 3L in ED  8/24 am Cortisol:13  Unknown source  CT abdomen pelvis with acute process, MR of the thoracic, lumbar, cervical spine with no evidence of epidural abscess or discitis.    Near syncope- (present on admission)  Assessment & Plan  Unclear etiology  Check troponin  Check orthostatic BP  Check echocardiogram  Monitor on telemetry for possible arrhythmia as etiology    Transaminitis  Assessment & Plan      Abdominal US no obvious evidence of cholecysistitis  CTAP unremarkable for acute process  Check hepatitis panel  Viral? EBV? Check mono test.  Check BNP and echocardiogram. To eval for passive congestion    Compression fracture of T8 vertebra ([facility])  Assessment & Plan  Appears chronic per MRI 08/16  Tramadol and oxycodone for pain for now. Avoid NSAID due to renal function.    ARACELI (acute kidney injury) ([facility])  Assessment & Plan  8/24 Cr 1.55 <1.87  Baseline 0.6  No hydro on CT  IV fluids to be continued  I will order for 8/25 am BMP  Esr and crp minimally elevated.    Severe obesity ([facility])  Assessment & Plan  Body mass index is 35.19 kg/m².  I have encouraged dietary change with caloric decrease and time restriction eating.  I will encourage future activity as tolerates    High anion gap metabolic acidosis  Assessment & Plan  Bicarb 17  Gap 21  Lactic acid 4.3    Type 2 diabetes mellitus with complication, without long-term current use of insulin ([facility])- (present on admission)  Assessment & Plan  Monitor Accu-Cheks and cover sliding scale insulin  Holding outpatient metformin in the face of lactic acidosis  Last glycohemoglobin: 5.4 in past 8 months  I have  encouraged dietary lifestyle modifications    Essential hypertension- (present on admission)  Assessment & Plan  Hold ACE/HCTZ in setting of hypotension and renal function  Monitoring vitals    MAGDALENA (obstructive sleep apnea)- (present on admission)  Assessment & Plan  BiPAP at night.  States he has been actively attempting to lose weight.         VTE prophylaxis:    Xarelto 10mg daily as prophylaxis      I have performed a physical exam and reviewed and updated ROS and Plan today (8/24/2023). In review of yesterday's note (8/23/2023), there are no changes except as documented above.

## 2023-08-24 NOTE — ASSESSMENT & PLAN NOTE
Improved with fluids  Denies overt dehydration.  CHeck BNP, troponin, and echocardiogram  Monitor on telemetry  No leukocytosis, afebrile, no tachycardia. Stopped antibiotics 8/24  SBP as low as 85/51 in ED  Fluid responsive to 3L in ED  8/24 am Cortisol:13  Unknown source  CT abdomen pelvis with acute process, MR of the thoracic, lumbar, cervical spine with no evidence of epidural abscess or discitis.

## 2023-08-24 NOTE — ASSESSMENT & PLAN NOTE
Body mass index is 35.19 kg/m².  I have encouraged dietary change with caloric decrease and time restriction eating.  I will encourage future activity as tolerates

## 2023-08-24 NOTE — ASSESSMENT & PLAN NOTE
Monitor Accu-Cheks and cover sliding scale insulin  Holding outpatient metformin in the face of lactic acidosis  Last glycohemoglobin: 5.4 in past 8 months  I have encouraged dietary lifestyle modifications

## 2023-08-24 NOTE — H&P
"Hospital Medicine History & Physical Note    Date of Service  8/23/2023    Primary Care Physician  Beth Webber P.A.-C.    Code Status  Full Code    Chief Complaint  Chief Complaint   Patient presents with    Back Pain    Weakness     Pt BIB ems for weakness to bilateral lower extremities. Pt's last known normal was 2330 last night. Pt woke up this AM at 1100 today feeling like \"jelly.\" Neuro intact. Aox4. Pt speaking in full sentences. Pt able to move all extremities. +CMS to all extremities. Pt describes having neck low back pain for last 6 weeks with movement. Current pain level at 5/10.        History of Presenting Illness  Zuhair Jj is a 52 y.o. male who presented 8/23/2023 with generalized weakness.  Medical history is significant for essential hypertension, 2 diabetes.  Presents today after experiencing an episode of generalized weakness, presyncope, visual disturbances earlier this morning.  Patient denies pain with urination, nausea, vomiting.  Patient does endorse headache and diarrhea but this does appear to be more his baseline considering that he is on metformin.  Patient also endorses back pain which started after helping a friend move several weeks ago.  Patient denies fever, chills, cough, shortness of breath, night sweats.  On presentation to the ED.  Initial vitals were noted to have systolic blood pressure in the 80s.  Patient received 3 L of IV fluids with improvement in blood pressure to the low 100s.  Laboratory evaluation [place] for lactic acid of 4.1, odium 131, bicarb 17, anion gap 21, creatinine 1.87.  CT abdomen pelvis with acute process, MR of the thoracic, lumbar, cervical spine with no evidence of epidural abscess or discitis.  Discussed case with ER provider and patient will be admitted for further work-up and monitoring.    I discussed the plan of care with patient, family, and ER provider .    Review of Systems  Review of Systems   Constitutional:  Negative for chills " and fever.        Generalized weakness   HENT:  Negative for congestion and sore throat.    Eyes:  Negative for blurred vision and double vision.   Respiratory:  Negative for cough and shortness of breath.    Cardiovascular:  Negative for chest pain and palpitations.   Gastrointestinal:  Positive for diarrhea. Negative for abdominal pain, nausea and vomiting.   Genitourinary:  Negative for dysuria and frequency.   Musculoskeletal:  Positive for back pain. Negative for falls.   Skin:  Negative for rash.   Neurological:  Negative for sensory change and headaches.   Psychiatric/Behavioral:  Negative for hallucinations and substance abuse.        Past Medical History   has a past medical history of Acute bilateral low back pain with bilateral sciatica (7/14/2023), ARACELI (acute kidney injury) ([facility]) (8/23/2023), Allergy, Arrhythmia, Asthma, Chest pain, Close exposure to COVID-19 virus (08/06/2020), Diabetes ([facility]), Dyspnea on exertion (03/24/2020), Heart murmur, High cholesterol (02/24/2023), Hyperlipidemia, Hypertension, Overweight, Rheumatic fever with cardiac involvement, Sleep apnea (02/24/2023), and Sprain of medial collateral ligament of left knee (06/05/2019).    Surgical History   has a past surgical history that includes other.     Family History  family history includes Cerebral aneurysm (age of onset: 54) in his maternal grandfather; Heart Disease in his mother; Heart Failure (age of onset: 68) in his mother; Hypertension in his maternal grandfather and mother; Kidney cancer (age of onset: 52) in his father; No Known Problems in his brother, brother, and child.   Family history reviewed with patient. There is no family history that is pertinent to the chief complaint.     Social History   reports that he has never smoked. He has never used smokeless tobacco. He reports that he does not drink alcohol and does not use drugs.    Allergies  Allergies   Allergen Reactions    Pcn [Penicillins] Anaphylaxis        Medications  Prior to Admission Medications   Prescriptions Last Dose Informant Patient Reported? Taking?   PROAIR  (90 Base) MCG/ACT Aero Soln inhalation aerosol   No No   Sig: Inhale 1-2 Puffs every four hours as needed for Shortness of Breath.   QVAR REDIHALER 80 MCG/ACT inhaler   No No   Sig: Inhale 1 Puff 2 times a day.   VITAMIN D PO   Yes No   Sig: Take  by mouth.   aspirin 81 MG EC tablet   Yes No   Sig: Take 81 mg by mouth every day at 6 PM.   atorvastatin (LIPITOR) 40 MG Tab   No No   Sig: TAKE ONE TABLET BY MOUTH EVERY EVENING   diclofenac DR (VOLTAREN) 75 MG Tablet Delayed Response   No No   Sig: Take 1 Tablet by mouth 2 times a day.   lisinopril-hydrochlorothiazide (PRINZIDE) 20-25 MG per tablet   No No   Sig: Take 1 Tablet by mouth every day.   metFORMIN ER (GLUCOPHAGE XR) 750 MG TABLET SR 24 HR   No No   Sig: TAKE ONE TABLET BY MOUTH DAILY   tizanidine (ZANAFLEX) 4 MG Tab   No No   Sig: Take 1 Tablet by mouth 3 times a day.   traMADol (ULTRAM) 50 MG Tab   No No   Sig: Take 1 Tablet by mouth every four hours as needed for Severe Pain for up to 10 days.      Facility-Administered Medications: None       Physical Exam  Temp:  [37.2 °C (99 °F)] 37.2 °C (99 °F)  Pulse:  [] 93  Resp:  [18-20] 19  BP: ()/(51-63) 120/62  SpO2:  [89 %-98 %] 89 %  Blood Pressure: 118/63   Temperature: 37.2 °C (99 °F)   Pulse: 100   Respiration: 19   Pulse Oximetry: 94 %       Physical Exam  Vitals and nursing note reviewed.   Constitutional:       General: He is not in acute distress.     Appearance: He is obese. He is not toxic-appearing.   HENT:      Head: Normocephalic.      Right Ear: External ear normal.      Left Ear: External ear normal.      Nose: No congestion.      Mouth/Throat:      Mouth: Mucous membranes are dry.      Pharynx: No oropharyngeal exudate.   Eyes:      General: No scleral icterus.     Pupils: Pupils are equal, round, and reactive to light.   Cardiovascular:      Rate and  "Rhythm: Normal rate and regular rhythm.      Heart sounds: No murmur heard.  Pulmonary:      Breath sounds: No wheezing.   Abdominal:      Palpations: Abdomen is soft.      Tenderness: There is no abdominal tenderness. There is no guarding or rebound.   Musculoskeletal:         General: No swelling or deformity.   Skin:     Coloration: Skin is not jaundiced.      Findings: No bruising.   Neurological:      General: No focal deficit present.      Mental Status: He is oriented to person, place, and time.      Motor: No weakness.   Psychiatric:         Mood and Affect: Mood normal.         Behavior: Behavior normal.         Laboratory:  Recent Labs     08/23/23  1255   WBC 7.6   RBC 3.95*   HEMOGLOBIN 13.3*   HEMATOCRIT 38.7*   MCV 98.0*   MCH 33.7*   MCHC 34.4   RDW 45.8   PLATELETCT 173   MPV 8.7*     Recent Labs     08/23/23  1255   SODIUM 131*   POTASSIUM 4.6   CHLORIDE 93*   CO2 17*   GLUCOSE 123*   BUN 16   CREATININE 1.87*   CALCIUM 9.3     Recent Labs     08/23/23  1255   ALTSGPT 536*   ASTSGOT 431*   ALKPHOSPHAT 95   TBILIRUBIN 0.7   LIPASE 28   GLUCOSE 123*         No results for input(s): \"NTPROBNP\" in the last 72 hours.      No results for input(s): \"TROPONINT\" in the last 72 hours.    Imaging:  US-RUQ   Final Result      1.  Hepatomegaly.   2.  Hepatic steatosis and/or diffuse hepatocellular disease.   3.  Dilated main portal vein suggesting portal hypertension.   4.  Nonspecific gallbladder wall thickening. This finding can be seen in the setting of liver disease. No discrete sonographic evidence of acute cholecystitis.      INTERPRETING LOCATION: [address] [place] [address], [address]      CT-ABDOMEN-PELVIS WITH   Final Result      1.  No evidence of acute inflammatory process in the abdomen or pelvis   2.  Colonic diverticulosis   3.  Hepatic steatosis      DX-CHEST-PORTABLE (1 VIEW)   Final Result      No acute cardiac or pulmonary abnormalities are identified.      MR-THORACIC SPINE-WITH    (Results Pending) "   MR-LUMBAR SPINE-WITH    (Results Pending)   MR-CERVICAL SPINE-WITH & W/O    (Results Pending)       X-Ray:  I have personally reviewed the images and compared with prior images.    Assessment/Plan:  Justification for Admission Status  I anticipate this patient will require at least two midnights for appropriate medical management, necessitating inpatient admission because hypotension with underlying infectious etiology of unknown source requiring fluid bolus and IV antibiotics    Patient will need a Telemetry bed on EMERGENCY service .  The need is secondary to hypotension and suspected infectious source.    * Hypotension- (present on admission)  Assessment & Plan  Secondary to infectious etiology - follow blood cultures  No leukocytosis, afebrile, no tachycardia  SBP as low as 85/51 in ED  Fluid responsive to 3L in ED  Check cortisol in AM  Unknown source  CT abdomen pelvis with acute process, MR of the thoracic, lumbar, cervical spine with no evidence of epidural abscess or discitis.  Continue Rocephin, Flagyl    Transaminitis  Assessment & Plan      Abdominal US no obvious evidence of cholecysistitis  CTAP unremarkable for acute process  Check hepatitis panel    Compression fracture of T8 vertebra ([facility])  Assessment & Plan  Appears chronic per MRI 08/16    ARACELI (acute kidney injury) ([facility])  Assessment & Plan  Cr 1.87  Baseline 0.6  No hydro on CT    Severe obesity ([facility])  Assessment & Plan  Secondary to excess caloric intake    High anion gap metabolic acidosis  Assessment & Plan  Bicarb 17  Gap 21  Lactic acid 4.3    Type 2 diabetes mellitus with complication, without long-term current use of insulin ([facility])- (present on admission)  Assessment & Plan  Sliding scale    Essential hypertension- (present on admission)  Assessment & Plan  Hold ACE/HCTZ in setting of hypotension        VTE prophylaxis: heparin ppx

## 2023-08-24 NOTE — CARE PLAN
The patient is Watcher - Medium risk of patient condition declining or worsening    Shift Goals  Clinical Goals: Stable hemodynamics  Patient Goals: Pain control  Family Goals: Updates    Progress made toward(s) clinical / shift goals:    Problem: Pain - Standard  Goal: Alleviation of pain or a reduction in pain to the patient’s comfort goal  Outcome: Progressing  Flowsheets (Taken 8/24/2023 0000)  Pain Rating Scale (NPRS): 2     Problem: Knowledge Deficit - Standard  Goal: Patient and family/care givers will demonstrate understanding of plan of care, disease process/condition, diagnostic tests and medications  Outcome: Progressing     Problem: Hemodynamics  Goal: Patient's hemodynamics, fluid balance and neurologic status will be stable or improve  Outcome: Progressing     Problem: Respiratory  Goal: Patient will achieve/maintain optimum respiratory ventilation and gas exchange  Outcome: Progressing  Flowsheets  Taken 8/24/2023 0000  O2 Delivery Device: Silicone Nasal Cannula  Taken 8/23/2023 2000  Deep Breathe and Cough: Performs Correctly

## 2023-08-24 NOTE — ASSESSMENT & PLAN NOTE
Appears chronic per MRI 08/16  Tramadol and oxycodone for pain for now. Avoid NSAID due to renal function.

## 2023-08-24 NOTE — ASSESSMENT & PLAN NOTE
Unclear etiology  Check troponin  Check orthostatic BP  Check echocardiogram  Monitor on telemetry for possible arrhythmia as etiology

## 2023-08-25 ENCOUNTER — APPOINTMENT (OUTPATIENT)
Dept: CARDIOLOGY | Facility: MEDICAL CENTER | Age: 53
DRG: 314 | End: 2023-08-25
Attending: HOSPITALIST
Payer: COMMERCIAL

## 2023-08-25 VITALS
HEART RATE: 78 BPM | SYSTOLIC BLOOD PRESSURE: 137 MMHG | BODY MASS INDEX: 34.93 KG/M2 | WEIGHT: 217.37 LBS | TEMPERATURE: 96.9 F | HEIGHT: 66 IN | DIASTOLIC BLOOD PRESSURE: 77 MMHG | RESPIRATION RATE: 18 BRPM | OXYGEN SATURATION: 92 %

## 2023-08-25 PROBLEM — E87.29 HIGH ANION GAP METABOLIC ACIDOSIS: Status: RESOLVED | Noted: 2023-08-23 | Resolved: 2023-08-25

## 2023-08-25 PROBLEM — R55 NEAR SYNCOPE: Status: RESOLVED | Noted: 2023-08-24 | Resolved: 2023-08-25

## 2023-08-25 PROBLEM — N17.9 AKI (ACUTE KIDNEY INJURY) (HCC): Status: RESOLVED | Noted: 2023-08-23 | Resolved: 2023-08-25

## 2023-08-25 PROBLEM — I95.9 HYPOTENSION: Status: RESOLVED | Noted: 2023-08-23 | Resolved: 2023-08-25

## 2023-08-25 LAB
ALBUMIN SERPL BCP-MCNC: 4.1 G/DL (ref 3.2–4.9)
ALBUMIN/GLOB SERPL: 1.5 G/DL
ALP SERPL-CCNC: 78 U/L (ref 30–99)
ALT SERPL-CCNC: 476 U/L (ref 2–50)
ANION GAP SERPL CALC-SCNC: 11 MMOL/L (ref 7–16)
AST SERPL-CCNC: 412 U/L (ref 12–45)
BACTERIA UR CULT: NORMAL
BILIRUB SERPL-MCNC: 1 MG/DL (ref 0.1–1.5)
BUN SERPL-MCNC: 17 MG/DL (ref 8–22)
CALCIUM ALBUM COR SERPL-MCNC: 9.3 MG/DL (ref 8.5–10.5)
CALCIUM SERPL-MCNC: 9.4 MG/DL (ref 8.5–10.5)
CHLORIDE SERPL-SCNC: 98 MMOL/L (ref 96–112)
CO2 SERPL-SCNC: 25 MMOL/L (ref 20–33)
CREAT SERPL-MCNC: 1.17 MG/DL (ref 0.5–1.4)
ERYTHROCYTE [DISTWIDTH] IN BLOOD BY AUTOMATED COUNT: 44.9 FL (ref 35.9–50)
GFR SERPLBLD CREATININE-BSD FMLA CKD-EPI: 74 ML/MIN/1.73 M 2
GLOBULIN SER CALC-MCNC: 2.8 G/DL (ref 1.9–3.5)
GLUCOSE BLD STRIP.AUTO-MCNC: 114 MG/DL (ref 65–99)
GLUCOSE BLD STRIP.AUTO-MCNC: 90 MG/DL (ref 65–99)
GLUCOSE SERPL-MCNC: 99 MG/DL (ref 65–99)
HCT VFR BLD AUTO: 35.3 % (ref 42–52)
HGB BLD-MCNC: 12.3 G/DL (ref 14–18)
LV EJECT FRACT  99904: 65
LV EJECT FRACT MOD 2C 99903: 72.95
LV EJECT FRACT MOD 4C 99902: 44.7
LV EJECT FRACT MOD BP 99901: 60.51
MCH RBC QN AUTO: 33.8 PG (ref 27–33)
MCHC RBC AUTO-ENTMCNC: 34.8 G/DL (ref 32.3–36.5)
MCV RBC AUTO: 97 FL (ref 81.4–97.8)
PLATELET # BLD AUTO: 131 K/UL (ref 164–446)
PMV BLD AUTO: 8.5 FL (ref 9–12.9)
POTASSIUM SERPL-SCNC: 3.9 MMOL/L (ref 3.6–5.5)
PROT SERPL-MCNC: 6.9 G/DL (ref 6–8.2)
RBC # BLD AUTO: 3.64 M/UL (ref 4.7–6.1)
SIGNIFICANT IND 70042: NORMAL
SITE SITE: NORMAL
SODIUM SERPL-SCNC: 134 MMOL/L (ref 135–145)
SOURCE SOURCE: NORMAL
WBC # BLD AUTO: 5.9 K/UL (ref 4.8–10.8)

## 2023-08-25 PROCEDURE — 93306 TTE W/DOPPLER COMPLETE: CPT | Mod: 26 | Performed by: INTERNAL MEDICINE

## 2023-08-25 PROCEDURE — 700102 HCHG RX REV CODE 250 W/ 637 OVERRIDE(OP): Performed by: INTERNAL MEDICINE

## 2023-08-25 PROCEDURE — 80053 COMPREHEN METABOLIC PANEL: CPT

## 2023-08-25 PROCEDURE — 85027 COMPLETE CBC AUTOMATED: CPT

## 2023-08-25 PROCEDURE — A9270 NON-COVERED ITEM OR SERVICE: HCPCS | Performed by: INTERNAL MEDICINE

## 2023-08-25 PROCEDURE — 94660 CPAP INITIATION&MGMT: CPT

## 2023-08-25 PROCEDURE — 93306 TTE W/DOPPLER COMPLETE: CPT

## 2023-08-25 PROCEDURE — A9270 NON-COVERED ITEM OR SERVICE: HCPCS | Performed by: HOSPITALIST

## 2023-08-25 PROCEDURE — 99239 HOSP IP/OBS DSCHRG MGMT >30: CPT | Performed by: HOSPITALIST

## 2023-08-25 PROCEDURE — 82962 GLUCOSE BLOOD TEST: CPT

## 2023-08-25 PROCEDURE — 700117 HCHG RX CONTRAST REV CODE 255: Performed by: HOSPITALIST

## 2023-08-25 PROCEDURE — 700102 HCHG RX REV CODE 250 W/ 637 OVERRIDE(OP): Performed by: HOSPITALIST

## 2023-08-25 RX ORDER — BECLOMETHASONE DIPROPIONATE HFA 80 UG/1
1 AEROSOL, METERED RESPIRATORY (INHALATION) 2 TIMES DAILY
Qty: 1 EACH | Refills: 0 | Status: SHIPPED | OUTPATIENT
Start: 2023-08-25

## 2023-08-25 RX ORDER — LISINOPRIL 20 MG/1
20 TABLET ORAL DAILY
Qty: 60 TABLET | Refills: 2 | Status: SHIPPED | OUTPATIENT
Start: 2023-08-26

## 2023-08-25 RX ORDER — LISINOPRIL 20 MG/1
20 TABLET ORAL
Status: DISCONTINUED | OUTPATIENT
Start: 2023-08-25 | End: 2023-08-25 | Stop reason: HOSPADM

## 2023-08-25 RX ADMIN — HUMAN ALBUMIN MICROSPHERES AND PERFLUTREN 3 ML: 10; .22 INJECTION, SOLUTION INTRAVENOUS at 17:15

## 2023-08-25 RX ADMIN — ASPIRIN 81 MG: 81 TABLET, COATED ORAL at 05:07

## 2023-08-25 RX ADMIN — SENNOSIDES AND DOCUSATE SODIUM 2 TABLET: 50; 8.6 TABLET ORAL at 05:07

## 2023-08-25 RX ADMIN — LISINOPRIL 20 MG: 20 TABLET ORAL at 10:18

## 2023-08-25 ASSESSMENT — PAIN DESCRIPTION - PAIN TYPE
TYPE: ACUTE PAIN
TYPE: ACUTE PAIN

## 2023-08-25 NOTE — DISCHARGE INSTRUCTIONS
Discharge Instructions    Discharged to home by car with relative. Discharged via wheelchair, hospital escort: Yes.  Special equipment needed: Not Applicable    Be sure to schedule a follow-up appointment with your primary care doctor or any specialists as instructed.     Discharge Plan:        I understand that a diet low in cholesterol, fat, and sodium is recommended for good health. Unless I have been given specific instructions below for another diet, I accept this instruction as my diet prescription.   Other diet: Diabetic    Special Instructions: None    -Is this patient being discharged with medication to prevent blood clots?  No    Is patient discharged on Warfarin / Coumadin?   No       Discharge Instructions per Eduardo Parnell D.O.      DIET: healthy balanced diet    ACTIVITY: as tolerates    DIAGNOSIS: Near syncope likely due to dehydration and questionable if due to HCTZ medication.  Arrhythmia possible but not noted while in hospital.    Return to ER if needed

## 2023-08-25 NOTE — CARE PLAN
The patient is Watcher - Medium risk of patient condition declining or worsening    Shift Goals  Clinical Goals: Stable hemodynamics  Patient Goals: Pain control  Family Goals: Updates    Progress made toward(s) clinical / shift goals:    Problem: Pain - Standard  Goal: Alleviation of pain or a reduction in pain to the patient’s comfort goal  Outcome: Progressing     Problem: Knowledge Deficit - Standard  Goal: Patient and family/care givers will demonstrate understanding of plan of care, disease process/condition, diagnostic tests and medications  Outcome: Progressing     Problem: Hemodynamics  Goal: Patient's hemodynamics, fluid balance and neurologic status will be stable or improve  Outcome: Progressing     Problem: Respiratory  Goal: Patient will achieve/maintain optimum respiratory ventilation and gas exchange  Outcome: Progressing       Patient is not progressing towards the following goals:

## 2023-08-26 NOTE — PROGRESS NOTES
Patient discharged home with wife. A&Ox4. IV's taken out, patient taken down via walking - per pt request and without hospital escort per patient request. Monitor taken off; monitor room notified. Patient belongings discharged with patient, including phone, and clothing. Discharge summary done with patient. RN provided education regarding follow up care, appointments, and medications. RN also provided education regarding when to call doctor and when to call 911.

## 2023-08-26 NOTE — DISCHARGE SUMMARY
"Discharge Summary    CHIEF COMPLAINT ON ADMISSION  Chief Complaint   Patient presents with    Back Pain    Weakness     Pt BIB ems for weakness to bilateral lower extremities. Pt's last known normal was 2330 last night. Pt woke up this AM at 1100 today feeling like \"jelly.\" Neuro intact. Aox4. Pt speaking in full sentences. Pt able to move all extremities. +CMS to all extremities. Pt describes having neck low back pain for last 6 weeks with movement. Current pain level at 5/10.        Reason for Admission  Back pain & weakness    Admission Date  8/23/2023    CODE STATUS  Full Code    HPI & HOSPITAL COURSE  Zuhair Jj is a 52 y.o. male  with obesity, HTN, mixed hyperlipidemia, MAGDALENA, ablation (22023), DMII.  He was admitted 8/23/2023 with generalized weakness with a near syncopal episode and visual disturbances.  In the ED his BP was in the 80's.  He received IV fluids with improvement in BP.  A lactic acid:4.1, Na:131, bicar:17, elevated LFT's, and Cr:1.87.  The CT abd/pelvis and MRI spine was without acute finding. The antibiotics were stopped as there were no sign of active infection.  Echocardiogram was normal.  He appears to have shock as possible etiology of elevated liver and renal function vs dehydration.  I have stopped hsi HCTZ as he was hyponatremic and had ARF.  He was discharged with LFT's in the 400s and I have recommended that he stay off his statin until improve LFT's on his labs.  He will follow up with his PCP: Beth Webber and Dr Mcknight in the next few weeks.  I have recommend hydration at home and avoid strenuous activity.  He may need consideration of holter monitor or Zio patch if recurrent.    Therefore, he is discharged in good and stable condition to home with close outpatient follow-up.    The patient met 2-midnight criteria for an inpatient stay at the time of discharge.    Discharge Date  8/25/23    FOLLOW UP ITEMS POST DISCHARGE  None    DISCHARGE DIAGNOSES  Principal Problem " (Resolved):    Hypotension (POA: Yes)  Active Problems:    MAGDALENA (obstructive sleep apnea) (POA: Yes)    Essential hypertension (POA: Yes)    Type 2 diabetes mellitus with complication, without long-term current use of insulin (HCC) (POA: Yes)    Severe obesity (HCC) (POA: Unknown)    Compression fracture of T8 vertebra (HCC) (POA: Unknown)    Transaminitis (POA: Unknown)  Resolved Problems:    High anion gap metabolic acidosis (POA: Unknown)    ARACELI (acute kidney injury) (HCC) (POA: Unknown)    Near syncope (POA: Yes)      FOLLOW UP  Future Appointments   Date Time Provider Department Center   9/11/2023 10:20 AM Beth Webber [facility]   9/13/2023 11:20 AM VASCULAR NURSE PRACTITIONER [facility] None   11/3/2023  3:40 PM Eduardo Evans M.D. [facility] None     [facility]  [address] [address]  [phone]    Follow up in 1 week(s)      [facility]  [address] [address]  [phone]    Follow up in 2 week(s)        MEDICATIONS ON DISCHARGE     Medication List        START taking these medications        Instructions   lisinopril 20 MG Tabs  Start taking on: August 26, 2023  Commonly known as: Prinivil   Take 1 Tablet by mouth every day.  Dose: 20 mg            CONTINUE taking these medications        Instructions   aspirin 81 MG EC tablet   Take 81 mg by mouth every day.  Dose: 81 mg     atorvastatin 40 MG Tabs  Commonly known as: Lipitor   TAKE ONE TABLET BY MOUTH EVERY EVENING     Cholecalciferol Liqd   Take 5,000 Units by mouth every day.  Dose: 5,000 Units     metFORMIN  MG Tb24  Commonly known as: Glucophage XR   TAKE ONE TABLET BY MOUTH DAILY     ProAir  (90 Base) MCG/ACT Aers inhalation aerosol  Generic drug: albuterol   Inhale 1-2 Puffs every four hours as needed for Shortness of Breath.  Dose: 1-2 Puff     Qvar RediHaler 80 MCG/ACT inhaler  Generic drug: beclomethasone HFA    Doctor's comments: 90 day supply  Inhale 1 Puff 2 times a day. Patient reports only using as needed.  Dose: 1 Puff            STOP taking these medications      lisinopril-hydrochlorothiazide 20-25 MG per tablet  Commonly known as: Prinzide              Allergies  Allergies   Allergen Reactions    Pcn [Penicillins] Anaphylaxis       DIET  Orders Placed This Encounter   Procedures    Diet Order Diet: Consistent CHO (Diabetic)     Standing Status:   Standing     Number of Occurrences:   1     Order Specific Question:   Diet:     Answer:   Consistent CHO (Diabetic) [4]       ACTIVITY  As tolerated.  Weight bearing as tolerated    CONSULTATIONS  None    PROCEDURES  none    LABORATORY  Lab Results   Component Value Date    SODIUM 134 (L) 08/25/2023    POTASSIUM 3.9 08/25/2023    CHLORIDE 98 08/25/2023    CO2 25 08/25/2023    GLUCOSE 99 08/25/2023    BUN 17 08/25/2023    CREATININE 1.17 08/25/2023    GLOMRATE 107 05/09/2023        Lab Results   Component Value Date    WBC 5.9 08/25/2023    HEMOGLOBIN 12.3 (L) 08/25/2023    HEMATOCRIT 35.3 (L) 08/25/2023    PLATELETCT 131 (L) 08/25/2023 8/25/2023 Echocardiogram:CONCLUSIONS  No prior study is available for comparison.   The left ventricular ejection fraction is estimated to be 65%.  No significant valvular abnormalities.   EC-ECHOCARDIOGRAM COMPLETE W/ CONT   Final Result      MR-CERVICAL SPINE-WITH & W/O   Final Result      1.  Normal MRI scan of the cervical cord and spine with and without gadolinium enhancement. No evidence of epidural abscess.      MR-LUMBAR SPINE-WITH   Final Result         MRI with contrast does not demonstrate any abnormal enhancement in the lumbar spine.      MR-THORACIC SPINE-WITH   Final Result         No abnormal enhancement is identified on the postcontrast sequences to suggest an epidural abscess.            US-RUQ   Final Result      1.  Hepatomegaly.   2.  Hepatic steatosis and/or diffuse hepatocellular disease.   3.  Dilated main portal  vein suggesting portal hypertension.   4.  Nonspecific gallbladder wall thickening. This finding can be seen in the setting of liver disease. No discrete sonographic evidence of acute cholecystitis.      INTERPRETING LOCATION: [address] [facility], [place], [address]      CT-ABDOMEN-PELVIS WITH   Final Result      1.  No evidence of acute inflammatory process in the abdomen or pelvis   2.  Colonic diverticulosis   3.  Hepatic steatosis      DX-CHEST-PORTABLE (1 VIEW)   Final Result      No acute cardiac or pulmonary abnormalities are identified.             Total time of the discharge process exceeds 38 minutes.

## 2023-08-28 LAB
BACTERIA BLD CULT: NORMAL
BACTERIA BLD CULT: NORMAL
SIGNIFICANT IND 70042: NORMAL
SIGNIFICANT IND 70042: NORMAL
SITE SITE: NORMAL
SITE SITE: NORMAL
SOURCE SOURCE: NORMAL
SOURCE SOURCE: NORMAL

## 2023-08-30 ENCOUNTER — APPOINTMENT (OUTPATIENT)
Dept: MEDICAL GROUP | Facility: CLINIC | Age: 53
End: 2023-08-30
Payer: COMMERCIAL

## 2023-08-30 ENCOUNTER — DOCUMENTATION (OUTPATIENT)
Dept: HEALTH INFORMATION MANAGEMENT | Facility: OTHER | Age: 53
End: 2023-08-30

## 2023-09-03 NOTE — DOCUMENTATION QUERY
[facility]                                                                       Query Response Note      PATIENT:               JERI SIMMONS  ACCT #:                  [id number]  MRN:                     [id number]  :                      1970  ADMIT DATE:       2023 12:46 PM  DISCH DATE:        2023 5:47 PM  RESPONDING  PROVIDER #:        [id number]           QUERY TEXT:    Shock is documented in the Medical Record.  Please specify the type.    NOTE:  If an appropriate response is not listed below, please respond with a new note.    Thank you.      The patient's Clinical Indicators include:   Discharge summary:  In  the ED his BP was in the 80's.  He appears to have shock as possible etiology of elevated liver and renal function vs dehydration.  Lactic acids 4.1    Risk factor: dehydration    Treatment: IVF resuscitation    Thank you,  Live Finney  Clinical   Connect via Help[employer]  Options provided:   -- Hypovolemic shock   -- Other shock   -- Other explanation, Please specify      Query created by: Live Finney on 2023 1:07 PM    RESPONSE TEXT:    Hypovolemic shock          Electronically signed by:  ALANA BROCK DO 9/3/2023 7:38 AM

## 2023-09-11 ENCOUNTER — APPOINTMENT (OUTPATIENT)
Dept: MEDICAL GROUP | Facility: CLINIC | Age: 53
End: 2023-09-11
Payer: COMMERCIAL

## 2023-10-13 ENCOUNTER — OFFICE VISIT (OUTPATIENT)
Dept: MEDICAL GROUP | Facility: PHYSICIAN GROUP | Age: 53
End: 2023-10-13
Payer: COMMERCIAL

## 2023-10-13 VITALS
WEIGHT: 217.59 LBS | TEMPERATURE: 97.5 F | HEART RATE: 96 BPM | HEIGHT: 66 IN | SYSTOLIC BLOOD PRESSURE: 124 MMHG | BODY MASS INDEX: 34.97 KG/M2 | DIASTOLIC BLOOD PRESSURE: 60 MMHG | OXYGEN SATURATION: 95 % | RESPIRATION RATE: 12 BRPM

## 2023-10-13 DIAGNOSIS — I95.89 HYPOTENSION DUE TO HYPOVOLEMIA: ICD-10-CM

## 2023-10-13 DIAGNOSIS — R79.89 ELEVATED LFTS: ICD-10-CM

## 2023-10-13 DIAGNOSIS — K76.0 STEATOSIS OF LIVER: ICD-10-CM

## 2023-10-13 DIAGNOSIS — E11.8 TYPE 2 DIABETES MELLITUS WITH COMPLICATION, WITHOUT LONG-TERM CURRENT USE OF INSULIN (HCC): ICD-10-CM

## 2023-10-13 DIAGNOSIS — E86.1 HYPOTENSION DUE TO HYPOVOLEMIA: ICD-10-CM

## 2023-10-13 DIAGNOSIS — R74.01 TRANSAMINITIS: ICD-10-CM

## 2023-10-13 PROCEDURE — 99214 OFFICE O/P EST MOD 30 MIN: CPT | Performed by: STUDENT IN AN ORGANIZED HEALTH CARE EDUCATION/TRAINING PROGRAM

## 2023-10-13 PROCEDURE — 3074F SYST BP LT 130 MM HG: CPT | Performed by: STUDENT IN AN ORGANIZED HEALTH CARE EDUCATION/TRAINING PROGRAM

## 2023-10-13 PROCEDURE — 3078F DIAST BP <80 MM HG: CPT | Performed by: STUDENT IN AN ORGANIZED HEALTH CARE EDUCATION/TRAINING PROGRAM

## 2023-10-13 ASSESSMENT — ENCOUNTER SYMPTOMS
DIARRHEA: 0
CHILLS: 0
VOMITING: 0
COUGH: 0
DIZZINESS: 0
WHEEZING: 0
FOCAL WEAKNESS: 0
NAUSEA: 0
CONSTIPATION: 0
HEADACHES: 0
BLOOD IN STOOL: 0
FEVER: 0
SHORTNESS OF BREATH: 0
PALPITATIONS: 0
ABDOMINAL PAIN: 0
HEARTBURN: 0
ORTHOPNEA: 0

## 2023-10-13 ASSESSMENT — FIBROSIS 4 INDEX: FIB4 SCORE: 4.79

## 2023-10-13 NOTE — PROGRESS NOTES
Subjective:   HISTORY OF THE PRESENT ILLNESS: Patient is a 53 y.o. male here today to establish care. .    Problem   Hypotension Due to Hypovolemia      She patient went to the hospital on 8/23/2023 for generalized weakness and syncopal episode.  Found to be hypotensive shock and severely high LFTs in the 400s.  He was taken off of statin medication due to LFTs and his hydrochlorothiazide medication for hypertension.      Right upper quadrant ultrasound shows hepatomegaly, hepatic steatosis, diffuse hepatocellular disease    CT abdomen 8/2023 shows hepatic steatosis     Transaminitis          Review of systems:  Review of Systems   Constitutional:  Negative for chills and fever.   Respiratory:  Negative for cough, shortness of breath and wheezing.    Cardiovascular:  Negative for chest pain, palpitations, orthopnea and leg swelling.   Gastrointestinal:  Negative for abdominal pain, blood in stool, constipation, diarrhea, heartburn, melena, nausea and vomiting.   Musculoskeletal:  Negative for joint pain.   Neurological:  Negative for dizziness, focal weakness and headaches.         Patient Active Problem List    Diagnosis Date Noted    Hypotension due to hypovolemia 08/23/2023    Severe obesity (HCC) 08/23/2023    Compression fracture of T8 vertebra (HCC) 08/23/2023    Transaminitis 08/23/2023    Acute bilateral low back pain with bilateral sciatica 07/14/2023    Neck pain, acute 07/14/2023    Elevated liver enzymes 05/05/2023    H/O cardiac radiofrequency ablation, 2/28/23. Dr VELASQUEZ Evans 03/23/2023    Elevated LFTs 01/13/2023    Obesity (BMI 30-39.9) 01/10/2023    Otitis media follow-up, not resolved, right 12/30/2022    Trigger little finger of right hand 12/27/2021    PVC's (premature ventricular contractions) 03/24/2020    Chronic left shoulder pain 01/27/2020    Mixed hyperlipidemia 01/27/2020    Type 2 diabetes mellitus with complication, without long-term current use of insulin (HCC) 11/21/2018    Class 2  severe obesity due to excess calories with serious comorbidity and body mass index (BMI) of 35.0 to 35.9 in adult (HCC) 06/05/2018    Elevated cortisol level 06/05/2018    Mild intermittent asthma without complication 10/06/2016    MAGDALENA (obstructive sleep apnea) 10/06/2016    Essential hypertension 10/06/2016     Past Surgical History:   Procedure Laterality Date    OTHER      jaw reconstruction     Social History     Tobacco Use    Smoking status: Never    Smokeless tobacco: Never   Vaping Use    Vaping Use: Never used   Substance Use Topics    Alcohol use: No    Drug use: No      Family History   Problem Relation Age of Onset    Heart Failure Mother 68        cause of death    Heart Disease Mother     Hypertension Mother     Kidney cancer Father 52        cause of death    No Known Problems Brother     No Known Problems Brother     Cerebral aneurysm Maternal Grandfather 54        cause of death    Hypertension Maternal Grandfather     No Known Problems Child      Current Outpatient Medications   Medication Sig Dispense Refill    lisinopril (PRINIVIL) 20 MG Tab Take 1 Tablet by mouth every day. 60 Tablet 2    QVAR REDIHALER 80 MCG/ACT inhaler Inhale 1 Puff 2 times a day. Patient reports only using as needed. 1 Each 0    Cholecalciferol Liquid Take 5,000 Units by mouth every day.      metFORMIN ER (GLUCOPHAGE XR) 750 MG TABLET SR 24 HR TAKE ONE TABLET BY MOUTH DAILY 90 Tablet 3    atorvastatin (LIPITOR) 40 MG Tab TAKE ONE TABLET BY MOUTH EVERY EVENING 90 Tablet 3    PROAIR  (90 Base) MCG/ACT Aero Soln inhalation aerosol Inhale 1-2 Puffs every four hours as needed for Shortness of Breath. 8.5 g 6    aspirin 81 MG EC tablet Take 81 mg by mouth every day.       No current facility-administered medications for this visit.       Allergies:  Allergies   Allergen Reactions    Pcn [Penicillins] Anaphylaxis       Allergies, past medical history, past surgical history, family history, social history reviewed and  "updated.    Objective:    /60 (BP Location: Left arm, Patient Position: Sitting, BP Cuff Size: Adult)   Pulse 96   Temp 36.4 °C (97.5 °F) (Temporal)   Resp 12   Ht 1.676 m (5' 6\")   Wt 98.7 kg (217 lb 9.5 oz)   SpO2 95%   BMI 35.12 kg/m²    Body mass index is 35.12 kg/m².    Physical exam:  General: Normal appearance, no acute distress, not ill-appearing  HEENT: EOM intact, conjunctiva normal limits, negative right/left eye discharge.  Sclera anicteric  Cardiovascular: Normal rate and rhythm, no murmurs  Pulmonary: No respiratory distress, no wheezing, no rales, breath sounds normal.  Abdomen: Bowel sounds normal, flat, soft.  No tenderness, no masses  Musculoskeletal: No edema bilaterally  Skin: Warm, dry, no lesions  Neurological: No focal deficits, normal gait  Psychiatric: Mood within normal limits    Assessment/Plan:    Problem List Items Addressed This Visit       Type 2 diabetes mellitus with complication, without long-term current use of insulin (HCC)    Relevant Orders    HEMOGLOBIN A1C    Elevated LFTs    Relevant Orders    Comp Metabolic Panel    HEP C VIRUS ANTIBODY    HEP B SURFACE ANTIGEN    Prothrombin Time    CBC WITH DIFFERENTIAL    Referral to Gastroenterology    Hypotension due to hypovolemia     Now resolved, he is feeling well.  Reports his blood pressures are stable.  Denies of lisinopril 20 mg daily.    Continue to monitor blood pressures, return to care 2 weeks for establish care visit         Transaminitis     Elevated transaminitis into the 400 range.  Perhaps it was secondary to hypovolemic shock.  Though his right upper quadrant ultrasound does show diffuse hepatocellular disease, steatosis as well as evidence of portal hypertension.    Referral to gastroenterology placed  Repeat CMP          Other Visit Diagnoses       Steatosis of liver        Relevant Orders    Referral to Gastroenterology           "

## 2023-10-14 NOTE — ASSESSMENT & PLAN NOTE
Now resolved, he is feeling well.  Reports his blood pressures are stable.  Denies of lisinopril 20 mg daily.    Continue to monitor blood pressures, return to care 2 weeks for establish care visit

## 2023-10-14 NOTE — ASSESSMENT & PLAN NOTE
Elevated transaminitis into the 400 range.  Perhaps it was secondary to hypovolemic shock.  Though his right upper quadrant ultrasound does show diffuse hepatocellular disease, steatosis as well as evidence of portal hypertension.    Referral to gastroenterology placed  Repeat CMP

## 2023-10-23 ENCOUNTER — APPOINTMENT (OUTPATIENT)
Dept: MEDICAL GROUP | Facility: CLINIC | Age: 53
End: 2023-10-23
Payer: COMMERCIAL

## 2023-10-26 ENCOUNTER — APPOINTMENT (OUTPATIENT)
Dept: MEDICAL GROUP | Facility: PHYSICIAN GROUP | Age: 53
End: 2023-10-26
Payer: COMMERCIAL

## 2023-11-02 ENCOUNTER — APPOINTMENT (OUTPATIENT)
Dept: MEDICAL GROUP | Facility: PHYSICIAN GROUP | Age: 53
End: 2023-11-02
Payer: COMMERCIAL

## 2023-11-10 ENCOUNTER — OFFICE VISIT (OUTPATIENT)
Dept: MEDICAL GROUP | Facility: PHYSICIAN GROUP | Age: 53
End: 2023-11-10
Payer: COMMERCIAL

## 2023-11-10 VITALS
TEMPERATURE: 97.5 F | DIASTOLIC BLOOD PRESSURE: 58 MMHG | HEART RATE: 78 BPM | SYSTOLIC BLOOD PRESSURE: 120 MMHG | HEIGHT: 66 IN | RESPIRATION RATE: 14 BRPM | BODY MASS INDEX: 34.47 KG/M2 | OXYGEN SATURATION: 97 % | WEIGHT: 214.51 LBS

## 2023-11-10 DIAGNOSIS — R73.09 ELEVATED GLUCOSE LEVEL: ICD-10-CM

## 2023-11-10 DIAGNOSIS — Z12.5 PROSTATE CANCER SCREENING: ICD-10-CM

## 2023-11-10 DIAGNOSIS — I10 ESSENTIAL HYPERTENSION: ICD-10-CM

## 2023-11-10 DIAGNOSIS — Z00.00 HEALTHCARE MAINTENANCE: ICD-10-CM

## 2023-11-10 DIAGNOSIS — E78.2 MIXED HYPERLIPIDEMIA: ICD-10-CM

## 2023-11-10 DIAGNOSIS — R74.8 ELEVATED LIVER ENZYMES: ICD-10-CM

## 2023-11-10 DIAGNOSIS — J45.20 MILD INTERMITTENT REACTIVE AIRWAY DISEASE WITHOUT COMPLICATION: ICD-10-CM

## 2023-11-10 DIAGNOSIS — G47.33 OSA (OBSTRUCTIVE SLEEP APNEA): ICD-10-CM

## 2023-11-10 DIAGNOSIS — Z23 NEED FOR VACCINATION: ICD-10-CM

## 2023-11-10 PROBLEM — R79.89 ELEVATED CORTISOL LEVEL: Status: RESOLVED | Noted: 2018-06-05 | Resolved: 2023-11-10

## 2023-11-10 PROBLEM — I95.89 HYPOTENSION DUE TO HYPOVOLEMIA: Status: RESOLVED | Noted: 2023-08-23 | Resolved: 2023-11-10

## 2023-11-10 PROBLEM — E86.1 HYPOTENSION DUE TO HYPOVOLEMIA: Status: RESOLVED | Noted: 2023-08-23 | Resolved: 2023-11-10

## 2023-11-10 PROBLEM — M65.351 TRIGGER LITTLE FINGER OF RIGHT HAND: Status: RESOLVED | Noted: 2021-12-27 | Resolved: 2023-11-10

## 2023-11-10 PROBLEM — M54.2 NECK PAIN, ACUTE: Status: RESOLVED | Noted: 2023-07-14 | Resolved: 2023-11-10

## 2023-11-10 PROCEDURE — 3074F SYST BP LT 130 MM HG: CPT | Performed by: STUDENT IN AN ORGANIZED HEALTH CARE EDUCATION/TRAINING PROGRAM

## 2023-11-10 PROCEDURE — 3078F DIAST BP <80 MM HG: CPT | Performed by: STUDENT IN AN ORGANIZED HEALTH CARE EDUCATION/TRAINING PROGRAM

## 2023-11-10 PROCEDURE — 90472 IMMUNIZATION ADMIN EACH ADD: CPT | Performed by: STUDENT IN AN ORGANIZED HEALTH CARE EDUCATION/TRAINING PROGRAM

## 2023-11-10 PROCEDURE — 90471 IMMUNIZATION ADMIN: CPT | Performed by: STUDENT IN AN ORGANIZED HEALTH CARE EDUCATION/TRAINING PROGRAM

## 2023-11-10 PROCEDURE — 99396 PREV VISIT EST AGE 40-64: CPT | Mod: 25 | Performed by: STUDENT IN AN ORGANIZED HEALTH CARE EDUCATION/TRAINING PROGRAM

## 2023-11-10 PROCEDURE — 90715 TDAP VACCINE 7 YRS/> IM: CPT | Performed by: STUDENT IN AN ORGANIZED HEALTH CARE EDUCATION/TRAINING PROGRAM

## 2023-11-10 PROCEDURE — 90677 PCV20 VACCINE IM: CPT | Performed by: STUDENT IN AN ORGANIZED HEALTH CARE EDUCATION/TRAINING PROGRAM

## 2023-11-10 RX ORDER — POLYETHYLENE GLYCOL-3350 AND ELECTROLYTES 236; 6.74; 5.86; 2.97; 22.74 G/274.31G; G/274.31G; G/274.31G; G/274.31G; G/274.31G
POWDER, FOR SOLUTION ORAL
COMMUNITY
Start: 2023-11-07 | End: 2023-11-10

## 2023-11-10 RX ORDER — DOXYCYCLINE 100 MG/1
CAPSULE ORAL
COMMUNITY
Start: 2023-10-27 | End: 2023-11-10

## 2023-11-10 ASSESSMENT — ENCOUNTER SYMPTOMS
PALPITATIONS: 0
HEARTBURN: 0
NAUSEA: 0
DIZZINESS: 0
FOCAL WEAKNESS: 0
FEVER: 0
COUGH: 0
ABDOMINAL PAIN: 0
CHILLS: 0
WHEEZING: 0
ORTHOPNEA: 0
HEADACHES: 0
SHORTNESS OF BREATH: 0

## 2023-11-10 ASSESSMENT — FIBROSIS 4 INDEX: FIB4 SCORE: 3.01

## 2023-11-10 NOTE — ASSESSMENT & PLAN NOTE
Following gastroenterology.  Likely secondary to nonalcoholic fatty liver disease.  We will be doing further testing with gastroenterology in 6 months.  Liver enzymes are trending down, hepatitis panel negative.

## 2023-11-10 NOTE — ASSESSMENT & PLAN NOTE
Chronic, stable.  Reactive airway disease most often with smoke in the air.  We will start using Qvar daily when this happens.  Follows with pulmonolgy

## 2023-11-10 NOTE — PROGRESS NOTES
Subjective:   HISTORY OF THE PRESENT ILLNESS: Patient is a 53 y.o. male here today to establish care. His/her prior PCP was Danny.  Patient to finish establishing care.    #Type 2 diabetes  Patient reports he was told he was prediabetic or perhaps even diabetic.  However he has never had an abnormal A1c.  He is currently taking metformin tendinopathy of gram daily.    Problem   Healthcare Maintenance   Elevated Liver Enzymes   Mixed Hyperlipidemia   Reactive Airway Disease Without Complication    Has Qvar inhaler to use as needed if needed, rescue inhaler albuterol.     Maury (Obstructive Sleep Apnea)   Essential Hypertension   Hypotension Due to Hypovolemia (Resolved)      She patient went to the hospital on 8/23/2023 for generalized weakness and syncopal episode.  Found to be hypotensive shock and severely high LFTs in the 400s.  He was taken off of statin medication due to LFTs and his hydrochlorothiazide medication for hypertension.      Right upper quadrant ultrasound shows hepatomegaly, hepatic steatosis, diffuse hepatocellular disease    CT abdomen 8/2023 shows hepatic steatosis     Neck Pain, Acute (Resolved)   Trigger Little Finger of Right Hand (Resolved)   Elevated Cortisol Level (Resolved)          Review of systems:  Review of Systems   Constitutional:  Negative for chills and fever.   Respiratory:  Negative for cough, shortness of breath and wheezing.    Cardiovascular:  Negative for chest pain, palpitations, orthopnea and leg swelling.   Gastrointestinal:  Negative for abdominal pain, heartburn, melena and nausea.   Genitourinary:  Negative for dysuria.   Musculoskeletal:  Negative for joint pain.   Neurological:  Negative for dizziness, focal weakness and headaches.         Patient Active Problem List    Diagnosis Date Noted    Healthcare maintenance 11/10/2023    Acute bilateral low back pain with bilateral sciatica 07/14/2023    Elevated liver enzymes 05/05/2023    H/O cardiac radiofrequency  ablation, 2/28/23. Dr VELASQUEZ Evans 03/23/2023    Obesity (BMI 30-39.9) 01/10/2023    Otitis media follow-up, not resolved, right 12/30/2022    PVC's (premature ventricular contractions) 03/24/2020    Chronic left shoulder pain 01/27/2020    Mixed hyperlipidemia 01/27/2020    Class 2 severe obesity due to excess calories with serious comorbidity and body mass index (BMI) of 35.0 to 35.9 in adult (HCC) 06/05/2018    Reactive airway disease without complication 10/06/2016    MAGDALENA (obstructive sleep apnea) 10/06/2016    Essential hypertension 10/06/2016     Past Surgical History:   Procedure Laterality Date    OTHER      jaw reconstruction     Social History     Tobacco Use    Smoking status: Never    Smokeless tobacco: Never   Vaping Use    Vaping Use: Never used   Substance Use Topics    Alcohol use: No    Drug use: No      Family History   Problem Relation Age of Onset    Heart Failure Mother 68        cause of death    Heart Disease Mother     Hypertension Mother     Kidney cancer Father 52        cause of death    No Known Problems Brother     No Known Problems Brother     Cerebral aneurysm Maternal Grandfather 54        cause of death    Hypertension Maternal Grandfather     No Known Problems Child      Current Outpatient Medications   Medication Sig Dispense Refill    lisinopril (PRINIVIL) 20 MG Tab Take 1 Tablet by mouth every day. 60 Tablet 2    QVAR REDIHALER 80 MCG/ACT inhaler Inhale 1 Puff 2 times a day. Patient reports only using as needed. 1 Each 0    Cholecalciferol Liquid Take 5,000 Units by mouth every day.      PROAIR  (90 Base) MCG/ACT Aero Soln inhalation aerosol Inhale 1-2 Puffs every four hours as needed for Shortness of Breath. 8.5 g 6    aspirin 81 MG EC tablet Take 81 mg by mouth every day.      atorvastatin (LIPITOR) 40 MG Tab TAKE ONE TABLET BY MOUTH EVERY EVENING (Patient not taking: Reported on 11/10/2023) 90 Tablet 3     No current facility-administered medications for this visit.  "      Allergies:  Allergies   Allergen Reactions    Pcn [Penicillins] Anaphylaxis       Allergies, past medical history, past surgical history, family history, social history reviewed and updated.    Objective:    /58 (BP Location: Right arm, Patient Position: Sitting, BP Cuff Size: Adult)   Pulse 78   Temp 36.4 °C (97.5 °F) (Temporal)   Resp 14   Ht 1.676 m (5' 6\")   Wt 97.3 kg (214 lb 8.1 oz)   SpO2 97%   BMI 34.62 kg/m²    Body mass index is 34.62 kg/m².    Physical exam:  General: Normal appearance, no acute distress, not ill-appearing  HEENT: EOM intact, conjunctiva normal limits, negative right/left eye discharge.  Sclera anicteric  Cardiovascular: Normal rate and rhythm, no murmurs  Pulmonary: No respiratory distress, no wheezing, no rales, breath sounds normal.  Musculoskeletal: No edema bilaterally  Skin: Warm, dry, no lesions  Neurological: No focal deficits, normal gait  Psychiatric: Mood within normal limits    Assessment/Plan:        Problem List Items Addressed This Visit       Reactive airway disease without complication     Chronic, stable.  Reactive airway disease most often with smoke in the air.  We will start using Qvar daily when this happens.  Follows with pulmonolgy         MAGDALENA (obstructive sleep apnea)     Chronic, stable.  On CPAP followed by sleep medicine.         Essential hypertension     Chronic, stable condition. Continue current regimen.  Cont lisinopril 20mg daily.          Mixed hyperlipidemia     Holding statin due to significantly elevated liver enzymes. Cont to trend downward and add back. Following with GI         Elevated liver enzymes     Following gastroenterology.  Likely secondary to nonalcoholic fatty liver disease.  We will be doing further testing with gastroenterology in 6 months.  Liver enzymes are trending down, hepatitis panel negative.         Healthcare maintenance     Patient here for a preventive medicine visit today and to establish care.  -Reviewed " all past medical history, family history, social history    -Diet and exercise appropriate counseling given  -Social determinants of health reviewed  -Tobacco, alcohol, recreational drug use: Reviewed no concerns total   -Cholesterol screening: cholesterol 188,   -Diabetes screening: A1c within normal limits      Immunizations  Immunizations: declines shingles     Cancer screenings:  Colorectal cancer screening: Scheduled  Lung Cancer Screening: Not indicated   Prostate Cancer Screening/PSA: Ordered     Patient Counseling:  --Discussed moderation in caloric intake, sufficient fresh fruits/vegetables, fiber, iron  --Discussed brushing, flossing, and dental visits.   --Encouraged regular exercise.   --Discussed tobacco, alcohol, or other drug use.  --Discussed sexually transmitted infections, partner selection  --Injury prevention: Discussed           Other Visit Diagnoses       Elevated glucose level        Relevant Orders    HEMOGLOBIN A1C    Prostate cancer screening        Relevant Orders    PROSTATE SPECIFIC AG SCREENING    Need for vaccination        Relevant Orders    Tdap =>8yo IM (Completed)    Pneumococcal Conjugate Vaccine 20-Valent (6 wks+) (Completed)              Return in about 6 months (around 5/10/2024), or if symptoms worsen or fail to improve, for blood pressure, labs.

## 2023-11-10 NOTE — ASSESSMENT & PLAN NOTE
Patient here for a preventive medicine visit today and to establish care.  -Reviewed all past medical history, family history, social history    -Diet and exercise appropriate counseling given  -Social determinants of health reviewed  -Tobacco, alcohol, recreational drug use: Reviewed no concerns total   -Cholesterol screening: cholesterol 188,   -Diabetes screening: A1c within normal limits      Immunizations  Immunizations: declines shingles     Cancer screenings:  Colorectal cancer screening: Scheduled  Lung Cancer Screening: Not indicated   Prostate Cancer Screening/PSA: Ordered     Patient Counseling:  --Discussed moderation in caloric intake, sufficient fresh fruits/vegetables, fiber, iron  --Discussed brushing, flossing, and dental visits.   --Encouraged regular exercise.   --Discussed tobacco, alcohol, or other drug use.  --Discussed sexually transmitted infections, partner selection  --Injury prevention: Discussed

## 2023-11-10 NOTE — ASSESSMENT & PLAN NOTE
Holding statin due to significantly elevated liver enzymes. Cont to trend downward and add back. Following with GI

## 2023-11-13 ENCOUNTER — DOCUMENTATION (OUTPATIENT)
Dept: HEALTH INFORMATION MANAGEMENT | Facility: OTHER | Age: 53
End: 2023-11-13
Payer: COMMERCIAL

## 2023-12-12 ENCOUNTER — OFFICE VISIT (OUTPATIENT)
Dept: CARDIOLOGY | Facility: MEDICAL CENTER | Age: 53
End: 2023-12-12
Attending: NURSE PRACTITIONER
Payer: COMMERCIAL

## 2023-12-12 VITALS
BODY MASS INDEX: 30.07 KG/M2 | RESPIRATION RATE: 16 BRPM | SYSTOLIC BLOOD PRESSURE: 128 MMHG | DIASTOLIC BLOOD PRESSURE: 76 MMHG | WEIGHT: 222 LBS | HEART RATE: 81 BPM | OXYGEN SATURATION: 97 % | HEIGHT: 72 IN

## 2023-12-12 DIAGNOSIS — I49.3 PVC'S (PREMATURE VENTRICULAR CONTRACTIONS): ICD-10-CM

## 2023-12-12 DIAGNOSIS — E78.2 MIXED HYPERLIPIDEMIA: ICD-10-CM

## 2023-12-12 DIAGNOSIS — I10 ESSENTIAL HYPERTENSION: ICD-10-CM

## 2023-12-12 DIAGNOSIS — Z98.890 H/O CARDIAC RADIOFREQUENCY ABLATION: ICD-10-CM

## 2023-12-12 DIAGNOSIS — R74.8 ELEVATED LIVER ENZYMES: ICD-10-CM

## 2023-12-12 LAB — EKG IMPRESSION: NORMAL

## 2023-12-12 PROCEDURE — 99213 OFFICE O/P EST LOW 20 MIN: CPT | Performed by: NURSE PRACTITIONER

## 2023-12-12 PROCEDURE — 93005 ELECTROCARDIOGRAM TRACING: CPT | Performed by: NURSE PRACTITIONER

## 2023-12-12 PROCEDURE — 3074F SYST BP LT 130 MM HG: CPT | Performed by: NURSE PRACTITIONER

## 2023-12-12 PROCEDURE — 99214 OFFICE O/P EST MOD 30 MIN: CPT | Performed by: NURSE PRACTITIONER

## 2023-12-12 PROCEDURE — 93010 ELECTROCARDIOGRAM REPORT: CPT | Performed by: INTERNAL MEDICINE

## 2023-12-12 PROCEDURE — 3078F DIAST BP <80 MM HG: CPT | Performed by: NURSE PRACTITIONER

## 2023-12-12 PROCEDURE — 99212 OFFICE O/P EST SF 10 MIN: CPT | Performed by: NURSE PRACTITIONER

## 2023-12-12 ASSESSMENT — ENCOUNTER SYMPTOMS
SENSORY CHANGE: 0
CHILLS: 0
DIZZINESS: 0
TREMORS: 0
HEMOPTYSIS: 0
PND: 0
SHORTNESS OF BREATH: 0
HEADACHES: 0
WHEEZING: 0
SPUTUM PRODUCTION: 0
PALPITATIONS: 0
SPEECH CHANGE: 0
FEVER: 0
ORTHOPNEA: 0
HEARTBURN: 0
NAUSEA: 0
TINGLING: 0
VOMITING: 0
WEIGHT LOSS: 0
COUGH: 0
FOCAL WEAKNESS: 0

## 2023-12-12 ASSESSMENT — FIBROSIS 4 INDEX: FIB4 SCORE: 3.01

## 2023-12-12 NOTE — PROGRESS NOTES
"Chief Complaint   Patient presents with    Premature Ventricular Contractions (PVCs)       Subjective     Zuhair Jj is a 53 y.o. male who presents today for follow-up.    He is followed by Dr. Evans for EP.  Arrhythmia history significant for PVCs status post ablation of PVCs from RVOT to 2023.  Noted to have occasional PVCs on post ablation testing with use of Isuprel.  Shall medical history significant for hypertension, and MAGDALENA.    Today in follow-up he reports overall well.  He does not report palpitations or feelings of PVCs.      Reports hospitalization few months ago secondary to weakness, near syncope and visual disturbances.  Lactic acid was found to be elevated as well as LFTs and serum creatinine.  Imaging was without acute finding.  Echocardiogram was completed and normal.  Not to be secondary to elevated liver function and renal function versus dehydration.  No recurrence since then.  Remains off statins for the time being as liver enzymes remain slightly elevated.  He has close follow-up with GI established and repeat blood work in a couple of months.  Past Medical History:   Diagnosis Date    Acute bilateral low back pain with bilateral sciatica 7/14/2023    ARACELI (acute kidney injury) (HCC) 8/23/2023    Allergy     Arrhythmia     Asthma     Chest pain     Close exposure to COVID-19 virus 08/06/2020    Diabetes (HCC)     Dyspnea on exertion 03/24/2020    Heart murmur     High cholesterol 02/24/2023    Hyperlipidemia     Hypertension     Overweight     Rheumatic fever with cardiac involvement     patient cant recall but thinks there was a \"tear\" in his heart    Sleep apnea 02/24/2023    Sprain of medial collateral ligament of left knee 06/05/2019     Past Surgical History:   Procedure Laterality Date    OTHER      jaw reconstruction     Family History   Problem Relation Age of Onset    Heart Failure Mother 68        cause of death    Heart Disease Mother     Hypertension Mother     Kidney cancer " Father 52        cause of death    No Known Problems Brother     No Known Problems Brother     Cerebral aneurysm Maternal Grandfather 54        cause of death    Hypertension Maternal Grandfather     No Known Problems Child      Social History     Socioeconomic History    Marital status:      Spouse name: Not on file    Number of children: Not on file    Years of education: Not on file    Highest education level: Not on file   Occupational History    Occupation: Retired   Tobacco Use    Smoking status: Never    Smokeless tobacco: Never   Vaping Use    Vaping Use: Never used   Substance and Sexual Activity    Alcohol use: No    Drug use: No    Sexual activity: Yes     Partners: Female   Other Topics Concern    Not on file   Social History Narrative    Not on file     Social Determinants of Health     Financial Resource Strain: Not on file   Food Insecurity: Not on file   Transportation Needs: Not on file   Physical Activity: Not on file   Stress: Not on file   Social Connections: Not on file   Intimate Partner Violence: Not on file   Housing Stability: Not on file     Allergies   Allergen Reactions    Pcn [Penicillins] Anaphylaxis     Outpatient Encounter Medications as of 12/12/2023   Medication Sig Dispense Refill    lisinopril (PRINIVIL) 20 MG Tab Take 1 Tablet by mouth every day. 60 Tablet 2    QVAR REDIHALER 80 MCG/ACT inhaler Inhale 1 Puff 2 times a day. Patient reports only using as needed. 1 Each 0    Cholecalciferol Liquid Take 5,000 Units by mouth every day.      atorvastatin (LIPITOR) 40 MG Tab TAKE ONE TABLET BY MOUTH EVERY EVENING (Patient not taking: Reported on 11/10/2023) 90 Tablet 3    PROAIR  (90 Base) MCG/ACT Aero Soln inhalation aerosol Inhale 1-2 Puffs every four hours as needed for Shortness of Breath. 8.5 g 6    aspirin 81 MG EC tablet Take 81 mg by mouth every day.       No facility-administered encounter medications on file as of 12/12/2023.     Review of Systems    Constitutional:  Negative for chills, fever, malaise/fatigue and weight loss.   HENT:  Negative for congestion and tinnitus.    Respiratory:  Negative for cough, hemoptysis, sputum production, shortness of breath and wheezing.    Cardiovascular:  Negative for chest pain, palpitations, orthopnea, leg swelling and PND.   Gastrointestinal:  Negative for heartburn, nausea and vomiting.   Neurological:  Negative for dizziness, tingling, tremors, sensory change, speech change, focal weakness and headaches.   All other systems reviewed and are negative.             Objective     There were no vitals taken for this visit.    Physical Exam  Constitutional:       Appearance: Normal appearance. He is well-developed.   HENT:      Head: Normocephalic and atraumatic.      Mouth/Throat:      Mouth: Mucous membranes are moist.      Pharynx: Oropharynx is clear.   Eyes:      Extraocular Movements: Extraocular movements intact.      Conjunctiva/sclera: Conjunctivae normal.      Pupils: Pupils are equal, round, and reactive to light.   Neck:      Vascular: No JVD.   Cardiovascular:      Rate and Rhythm: Normal rate and regular rhythm.      Pulses: Normal pulses.      Heart sounds: Normal heart sounds. No murmur heard.     No friction rub. No gallop.   Pulmonary:      Effort: Pulmonary effort is normal.      Breath sounds: Normal breath sounds. No wheezing or rales.   Chest:      Chest wall: No tenderness.   Musculoskeletal:         General: Normal range of motion.      Cervical back: Normal range of motion and neck supple.      Right lower leg: No edema.      Left lower leg: No edema.   Skin:     General: Skin is warm and dry.      Capillary Refill: Capillary refill takes 2 to 3 seconds.   Neurological:      Mental Status: He is alert and oriented to person, place, and time.   Psychiatric:         Mood and Affect: Mood normal.         Behavior: Behavior normal.         Thought Content: Thought content normal.         Judgment:  Judgment normal.                Assessment & Plan     1. PVC's (premature ventricular contractions)  EKG      2. H/O cardiac radiofrequency ablation, 2/28/23. Dr VELASQUEZ Evans        3. Essential hypertension        4. Mixed hyperlipidemia        5. Elevated liver enzymes            Medical Decision Making: Today's Assessment/Status/Plan:   1.  PVCs  2.  Status post ablation  -Status post RVOT ablation of PVCs 2/28/2023.  Has had good clinical outcome thus far post ablation.  -Not on antiarrhythmics or beta-blockers.    3.  Hypertension  - Blood pressure is well-controlled in office today.  He is on lisinopril only at this point.  Antihypertensives de-escalated after after admission for suspected shock secondary to dehydration versus liver /renal failure dysfunction.    4.  Hyperlipidemia  - Statin on hold currently secondary to liver elevated liver enzymes.  He will follow-up with his GI.  When cleared to resume statins he will if unable to would recommend consideration of injectables.    Return to clinic in 6 months for review, sooner if clinical condition changes.    PLEASE NOTE: This Note was created using voice recognition Software. I have made every reasonable attempt to correct obvious errors, but I expect that there are errors of grammar and possibly content that I did not discover before finalizing the note
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
## 2024-01-12 ENCOUNTER — APPOINTMENT (RX ONLY)
Dept: URBAN - METROPOLITAN AREA CLINIC 31 | Facility: CLINIC | Age: 54
Setting detail: DERMATOLOGY
End: 2024-01-12

## 2024-01-12 DIAGNOSIS — Z71.89 OTHER SPECIFIED COUNSELING: ICD-10-CM

## 2024-01-12 DIAGNOSIS — L91.0 HYPERTROPHIC SCAR: ICD-10-CM

## 2024-01-12 DIAGNOSIS — D18.0 HEMANGIOMA: ICD-10-CM

## 2024-01-12 DIAGNOSIS — L81.4 OTHER MELANIN HYPERPIGMENTATION: ICD-10-CM

## 2024-01-12 DIAGNOSIS — L82.1 OTHER SEBORRHEIC KERATOSIS: ICD-10-CM

## 2024-01-12 DIAGNOSIS — L57.0 ACTINIC KERATOSIS: ICD-10-CM

## 2024-01-12 DIAGNOSIS — D22 MELANOCYTIC NEVI: ICD-10-CM

## 2024-01-12 DIAGNOSIS — L72.8 OTHER FOLLICULAR CYSTS OF THE SKIN AND SUBCUTANEOUS TISSUE: ICD-10-CM

## 2024-01-12 PROBLEM — D22.5 MELANOCYTIC NEVI OF TRUNK: Status: ACTIVE | Noted: 2024-01-12

## 2024-01-12 PROBLEM — D18.01 HEMANGIOMA OF SKIN AND SUBCUTANEOUS TISSUE: Status: ACTIVE | Noted: 2024-01-12

## 2024-01-12 PROCEDURE — 17003 DESTRUCT PREMALG LES 2-14: CPT

## 2024-01-12 PROCEDURE — 99213 OFFICE O/P EST LOW 20 MIN: CPT | Mod: 25

## 2024-01-12 PROCEDURE — 17000 DESTRUCT PREMALG LESION: CPT

## 2024-01-12 PROCEDURE — ? COUNSELING

## 2024-01-12 PROCEDURE — ? LIQUID NITROGEN

## 2024-01-12 ASSESSMENT — LOCATION SIMPLE DESCRIPTION DERM
LOCATION SIMPLE: RIGHT WRIST
LOCATION SIMPLE: LEFT FOREHEAD
LOCATION SIMPLE: ABDOMEN
LOCATION SIMPLE: SCALP
LOCATION SIMPLE: RIGHT UPPER BACK
LOCATION SIMPLE: RIGHT SCALP

## 2024-01-12 ASSESSMENT — LOCATION DETAILED DESCRIPTION DERM
LOCATION DETAILED: PERIUMBILICAL SKIN
LOCATION DETAILED: RIGHT CENTRAL FRONTAL SCALP
LOCATION DETAILED: RIGHT INFERIOR UPPER BACK
LOCATION DETAILED: RIGHT INFERIOR MEDIAL UPPER BACK
LOCATION DETAILED: RIGHT LATERAL DORSAL WRIST
LOCATION DETAILED: RIGHT SUPERIOR UPPER BACK
LOCATION DETAILED: RIGHT SUPERIOR PARIETAL SCALP
LOCATION DETAILED: LEFT SUPERIOR FOREHEAD

## 2024-01-12 ASSESSMENT — LOCATION ZONE DERM
LOCATION ZONE: TRUNK
LOCATION ZONE: FACE
LOCATION ZONE: SCALP
LOCATION ZONE: ARM

## 2024-02-06 ENCOUNTER — OFFICE VISIT (OUTPATIENT)
Dept: SLEEP MEDICINE | Facility: MEDICAL CENTER | Age: 54
End: 2024-02-06
Attending: INTERNAL MEDICINE
Payer: COMMERCIAL

## 2024-02-06 ENCOUNTER — NON-PROVIDER VISIT (OUTPATIENT)
Dept: SLEEP MEDICINE | Facility: MEDICAL CENTER | Age: 54
End: 2024-02-06
Attending: NURSE PRACTITIONER
Payer: COMMERCIAL

## 2024-02-06 VITALS — HEIGHT: 67 IN | WEIGHT: 220 LBS | BODY MASS INDEX: 34.53 KG/M2

## 2024-02-06 VITALS
SYSTOLIC BLOOD PRESSURE: 124 MMHG | HEIGHT: 67 IN | OXYGEN SATURATION: 97 % | WEIGHT: 220 LBS | DIASTOLIC BLOOD PRESSURE: 70 MMHG | HEART RATE: 93 BPM | BODY MASS INDEX: 34.53 KG/M2

## 2024-02-06 DIAGNOSIS — R06.02 SHORTNESS OF BREATH: ICD-10-CM

## 2024-02-06 DIAGNOSIS — G47.33 OSA (OBSTRUCTIVE SLEEP APNEA): ICD-10-CM

## 2024-02-06 DIAGNOSIS — J45.21 MILD INTERMITTENT ASTHMA WITH ACUTE EXACERBATION: ICD-10-CM

## 2024-02-06 PROCEDURE — 3074F SYST BP LT 130 MM HG: CPT | Performed by: INTERNAL MEDICINE

## 2024-02-06 PROCEDURE — 94729 DIFFUSING CAPACITY: CPT | Mod: 26 | Performed by: INTERNAL MEDICINE

## 2024-02-06 PROCEDURE — 94060 EVALUATION OF WHEEZING: CPT | Performed by: NURSE PRACTITIONER

## 2024-02-06 PROCEDURE — 94726 PLETHYSMOGRAPHY LUNG VOLUMES: CPT | Performed by: NURSE PRACTITIONER

## 2024-02-06 PROCEDURE — 99214 OFFICE O/P EST MOD 30 MIN: CPT | Performed by: INTERNAL MEDICINE

## 2024-02-06 PROCEDURE — 94060 EVALUATION OF WHEEZING: CPT | Mod: 26 | Performed by: INTERNAL MEDICINE

## 2024-02-06 PROCEDURE — 99212 OFFICE O/P EST SF 10 MIN: CPT | Performed by: INTERNAL MEDICINE

## 2024-02-06 PROCEDURE — 94729 DIFFUSING CAPACITY: CPT | Performed by: NURSE PRACTITIONER

## 2024-02-06 PROCEDURE — 3078F DIAST BP <80 MM HG: CPT | Performed by: INTERNAL MEDICINE

## 2024-02-06 PROCEDURE — 94726 PLETHYSMOGRAPHY LUNG VOLUMES: CPT | Mod: 26 | Performed by: INTERNAL MEDICINE

## 2024-02-06 ASSESSMENT — PULMONARY FUNCTION TESTS
FEV1/FVC_PERCENT_PREDICTED: 103
FEV1/FVC_PERCENT_PREDICTED: 95
FEV1/FVC: 81.82
FEV1/FVC_PERCENT_PREDICTED: 79
FVC_PREDICTED: 4.25
FEV1_PREDICTED: 3.35
FEV1: 3.06
FEV1/FVC_PERCENT_LLN: 66
FEV1/FVC_PREDICTED: 79
FEV1/FVC: 75
FEV1: 2.75
FEV1/FVC: 75
FVC: 3.66
FVC: 3.74
FVC_PERCENT_PREDICTED: 87
FEV1/FVC_PERCENT_CHANGE: 8
FEV1_PERCENT_PREDICTED: 91
FEV1_PERCENT_PREDICTED: 82
FEV1/FVC_PERCENT_PREDICTED: 95
FEV1/FVC_PERCENT_PREDICTED: 105
FEV1_LLN: 2.80
FEV1/FVC: 82
FVC_LLN: 3.55
FVC_PERCENT_PREDICTED: 86
FEV1/FVC_PERCENT_CHANGE: 900
FEV1_PERCENT_CHANGE: 9
FEV1_PERCENT_CHANGE: 1

## 2024-02-06 ASSESSMENT — ENCOUNTER SYMPTOMS
COUGH: 0
WEAKNESS: 0
EYE DISCHARGE: 0
PALPITATIONS: 0
SPEECH CHANGE: 0
VOMITING: 0
SORE THROAT: 0
HEARTBURN: 0
EYE PAIN: 0
FOCAL WEAKNESS: 0
BACK PAIN: 0
BLURRED VISION: 0
STRIDOR: 0
FALLS: 0
FEVER: 0
ORTHOPNEA: 0
DIAPHORESIS: 0
CLAUDICATION: 0
PND: 0
DIZZINESS: 0
DIARRHEA: 0
EYE REDNESS: 0
TREMORS: 0
NAUSEA: 0
CHILLS: 0
DOUBLE VISION: 0
MYALGIAS: 0
SHORTNESS OF BREATH: 1
WHEEZING: 0
ABDOMINAL PAIN: 0
PHOTOPHOBIA: 0
WEIGHT LOSS: 0
SPUTUM PRODUCTION: 0
HEMOPTYSIS: 0
DEPRESSION: 0
SINUS PAIN: 0
HEADACHES: 0
NECK PAIN: 0
CONSTIPATION: 0

## 2024-02-06 ASSESSMENT — PATIENT HEALTH QUESTIONNAIRE - PHQ9: CLINICAL INTERPRETATION OF PHQ2 SCORE: 0

## 2024-02-06 ASSESSMENT — FIBROSIS 4 INDEX
FIB4 SCORE: 3.01
FIB4 SCORE: 3.01

## 2024-02-06 NOTE — PROCEDURES
Technician: LAMONT Briceño    Technician Comment:  Good patient effort & cooperation.  The results of this test meet the ATS/ERS standards for acceptability & reproducibility.  Test was performed on the "[employer]" Body Plethysmograph-Elite DX system.  Predicted values were GLI-2012 for spirometry, GLI-2020 for DLCO, ITS for Lung Volumes.  The DLCO was uncorrected for Hgb.  A bronchodilator of Albuterol HFA -2puffs via spacer administered.  DLCO performed during dilation period.    Interpretation:  Baseline spirometry shows normal airflows.  There is significant bronchodilator response.  Lung volumes are within normal limits.  Fusion capacity is elevated at 134% predicted.  Normal pulmonary function testing with normal flow volume loop.  Bronchodilator responsiveness is consistent with stated diagnosis of asthma.

## 2024-02-06 NOTE — PROGRESS NOTES
Chief Complaint   Patient presents with    Follow-Up     LAST SEEN BY WILLIAM DIAMOND 4/26/23, CXR 8/23/23, PFT 2/6/24         HPI: This patient is a 53 y.o. male whom is followed in our clinic for asthma and MAGDALENA last seen by WILLIAM, Mary Sarah, on 4/26/23.  The pt is a life-long non-smoker. He is retired from a career as a . His asthma has been mild controlled with Qvar 80 and prn ADI. He has not been tx with oral steroids for acute exacerbation in the past 4 years but was tx for acute infectious bronchitis in October when seen in [place] at [facility]. Additionally, he was hospitalized in August for weakness at which point he was noted to be in acute renal failure with significant transaminitis and lactic acidosis.  No etiology for his lab abnormalities was found and for the most part these have improved.  He is seeing GI in [facility] and his primary is following renal function which has recovered although transaminases remain elevated although improved.  He presents today for routine follow-up.  He is using his albuterol 1-2 times per week and is complaining of some dull bilateral ear pain but no fevers or chills.  No sinus headaches.  Pulmonary function testing today shows normal airflows with normal lung volumes and normal DLCO.  He does have a bronchodilator response.    Compliance data from his CPAP machine was reviewed today from 1/8/2024 to 2/6/2024 with near 100% compliance at 96.7% for 29 out of 30 days average use of 6 and half hours nightly on CPAP of 13 cm of water with a treatment AHI of 3.6.    Past Medical History:   Diagnosis Date    Acute bilateral low back pain with bilateral sciatica 7/14/2023    ARACELI (acute kidney injury) (HCC) 8/23/2023    Allergy     Arrhythmia     Asthma     Chest pain     Close exposure to COVID-19 virus 08/06/2020    Diabetes (HCC)     Dyspnea on exertion 03/24/2020    Heart murmur     High cholesterol 02/24/2023    Hyperlipidemia     Hypertension      "Overweight     Rheumatic fever with cardiac involvement     patient cant recall but thinks there was a \"tear\" in his heart    Sleep apnea 02/24/2023    Sprain of medial collateral ligament of left knee 06/05/2019       Social History     Socioeconomic History    Marital status:      Spouse name: Not on file    Number of children: Not on file    Years of education: Not on file    Highest education level: Not on file   Occupational History    Occupation: Retired   Tobacco Use    Smoking status: Never    Smokeless tobacco: Never   Vaping Use    Vaping Use: Never used   Substance and Sexual Activity    Alcohol use: No    Drug use: No    Sexual activity: Yes     Partners: Female   Other Topics Concern    Not on file   Social History Narrative    Not on file     Social Determinants of Health     Financial Resource Strain: Not on file   Food Insecurity: Not on file   Transportation Needs: Not on file   Physical Activity: Not on file   Stress: Not on file   Social Connections: Not on file   Intimate Partner Violence: Not on file   Housing Stability: Not on file       Family History   Problem Relation Age of Onset    Heart Failure Mother 68        cause of death    Heart Disease Mother     Hypertension Mother     Kidney cancer Father 52        cause of death    No Known Problems Brother     No Known Problems Brother     Cerebral aneurysm Maternal Grandfather 54        cause of death    Hypertension Maternal Grandfather     No Known Problems Child        Current Outpatient Medications on File Prior to Visit   Medication Sig Dispense Refill    QVAR REDIHALER 80 MCG/ACT inhaler Inhale 1 Puff 2 times a day. Patient reports only using as needed. 1 Each 0    PROAIR  (90 Base) MCG/ACT Aero Soln inhalation aerosol Inhale 1-2 Puffs every four hours as needed for Shortness of Breath. 8.5 g 6    lisinopril (PRINIVIL) 20 MG Tab Take 1 Tablet by mouth every day. 60 Tablet 2    Cholecalciferol Liquid Take 5,000 Units by " "mouth every day. (Patient not taking: Reported on 12/12/2023)      atorvastatin (LIPITOR) 40 MG Tab TAKE ONE TABLET BY MOUTH EVERY EVENING (Patient not taking: Reported on 11/10/2023) 90 Tablet 3    aspirin 81 MG EC tablet Take 81 mg by mouth every day.       No current facility-administered medications on file prior to visit.       Pcn [penicillins]      ROS:   Review of Systems   Constitutional:  Positive for malaise/fatigue. Negative for chills, diaphoresis, fever and weight loss.   HENT:  Positive for ear pain. Negative for congestion, ear discharge, hearing loss, nosebleeds, sinus pain, sore throat and tinnitus.    Eyes:  Negative for blurred vision, double vision, photophobia, pain, discharge and redness.   Respiratory:  Positive for shortness of breath. Negative for cough, hemoptysis, sputum production, wheezing and stridor.    Cardiovascular:  Negative for chest pain, palpitations, orthopnea, claudication, leg swelling and PND.   Gastrointestinal:  Negative for abdominal pain, constipation, diarrhea, heartburn, nausea and vomiting.   Genitourinary:  Negative for dysuria and urgency.   Musculoskeletal:  Negative for back pain, falls, joint pain, myalgias and neck pain.   Skin:  Negative for itching and rash.   Neurological:  Negative for dizziness, tremors, speech change, focal weakness, weakness and headaches.   Endo/Heme/Allergies:  Negative for environmental allergies.   Psychiatric/Behavioral:  Negative for depression.        /70 (BP Location: Right arm, Patient Position: Sitting, BP Cuff Size: Adult)   Pulse 93   Ht 1.689 m (5' 6.5\")   Wt 99.8 kg (220 lb)   SpO2 97%   Physical Exam  Vitals reviewed.   Constitutional:       General: He is not in acute distress.     Appearance: Normal appearance. He is obese.   HENT:      Head: Normocephalic and atraumatic.      Right Ear: External ear normal.      Left Ear: External ear normal.      Nose: Nose normal. No congestion.      Mouth/Throat:      " Mouth: Mucous membranes are moist.      Pharynx: Oropharynx is clear. No oropharyngeal exudate.   Eyes:      General: No scleral icterus.     Extraocular Movements: Extraocular movements intact.      Conjunctiva/sclera: Conjunctivae normal.      Pupils: Pupils are equal, round, and reactive to light.   Cardiovascular:      Rate and Rhythm: Normal rate and regular rhythm.      Heart sounds: Normal heart sounds. No murmur heard.     No gallop.   Pulmonary:      Effort: Pulmonary effort is normal. No respiratory distress.      Breath sounds: No wheezing or rales.      Comments: Faint inspiratory wheezes b/l mid-lung fields  Abdominal:      General: There is no distension.      Palpations: Abdomen is soft.   Musculoskeletal:         General: Normal range of motion.      Cervical back: Normal range of motion and neck supple.      Right lower leg: No edema.      Left lower leg: No edema.   Skin:     General: Skin is warm and dry.      Findings: No rash.   Neurological:      Mental Status: He is alert and oriented to person, place, and time.      Cranial Nerves: No cranial nerve deficit.   Psychiatric:         Mood and Affect: Mood normal.         Behavior: Behavior normal.         PFTs as reviewed by me personally: As per HPI    Imaging as reviewed by me personally: As per HPI    Assessment:  1. Mild intermittent asthma with acute exacerbation        2. MAGDALENA (obstructive sleep apnea)            Plan:  Chronic, mild and symptoms are increased right now but no evidence for acute exacerbation to warrant oral steroids.  Continue Qvar and short acting bronchodilators.  He will reach out to me if symptoms worsen.  Chronic with excellent compliance and excellent clinical efficacy.  Continue CPAP at 13 cm of water.  Return in about 6 months (around 8/6/2024) for asthma, MAGDALENA.

## 2024-06-05 ENCOUNTER — APPOINTMENT (OUTPATIENT)
Dept: MEDICAL GROUP | Facility: PHYSICIAN GROUP | Age: 54
End: 2024-06-05
Payer: COMMERCIAL

## 2024-06-12 ENCOUNTER — OFFICE VISIT (OUTPATIENT)
Dept: CARDIOLOGY | Facility: MEDICAL CENTER | Age: 54
End: 2024-06-12
Attending: INTERNAL MEDICINE
Payer: COMMERCIAL

## 2024-06-12 VITALS
BODY MASS INDEX: 36.48 KG/M2 | SYSTOLIC BLOOD PRESSURE: 128 MMHG | OXYGEN SATURATION: 94 % | HEART RATE: 91 BPM | HEIGHT: 66 IN | DIASTOLIC BLOOD PRESSURE: 80 MMHG | RESPIRATION RATE: 12 BRPM | WEIGHT: 227 LBS

## 2024-06-12 DIAGNOSIS — E78.2 MIXED HYPERLIPIDEMIA: ICD-10-CM

## 2024-06-12 DIAGNOSIS — I10 ESSENTIAL HYPERTENSION: ICD-10-CM

## 2024-06-12 DIAGNOSIS — G47.33 OSA (OBSTRUCTIVE SLEEP APNEA): ICD-10-CM

## 2024-06-12 DIAGNOSIS — I49.3 PVC'S (PREMATURE VENTRICULAR CONTRACTIONS): ICD-10-CM

## 2024-06-12 DIAGNOSIS — Z98.890 H/O CARDIAC RADIOFREQUENCY ABLATION: ICD-10-CM

## 2024-06-12 DIAGNOSIS — E66.9 OBESITY (BMI 30-39.9): ICD-10-CM

## 2024-06-12 LAB — EKG IMPRESSION: NORMAL

## 2024-06-12 PROCEDURE — 93005 ELECTROCARDIOGRAM TRACING: CPT | Performed by: INTERNAL MEDICINE

## 2024-06-12 PROCEDURE — 3079F DIAST BP 80-89 MM HG: CPT | Performed by: INTERNAL MEDICINE

## 2024-06-12 PROCEDURE — 3074F SYST BP LT 130 MM HG: CPT | Performed by: INTERNAL MEDICINE

## 2024-06-12 PROCEDURE — 93010 ELECTROCARDIOGRAM REPORT: CPT | Performed by: INTERNAL MEDICINE

## 2024-06-12 PROCEDURE — 99212 OFFICE O/P EST SF 10 MIN: CPT | Performed by: INTERNAL MEDICINE

## 2024-06-12 PROCEDURE — 99214 OFFICE O/P EST MOD 30 MIN: CPT | Performed by: INTERNAL MEDICINE

## 2024-06-12 ASSESSMENT — FIBROSIS 4 INDEX: FIB4 SCORE: 2.76

## 2024-06-12 NOTE — PROGRESS NOTES
"Chief Complaint   Patient presents with    Premature Ventricular Contractions (PVCs)     F/V DX: PVC's (premature ventricular contractions)       Subjective     Zuhair Jj is a 53 y.o. male who presents today status post ablation for RV outflow tract PVCs.  Feels well being seen by GI.  Off statins at this time.  No palpitations.  Did have some atypical chest pain.    Past Medical History:   Diagnosis Date    Acute bilateral low back pain with bilateral sciatica 7/14/2023    ARACELI (acute kidney injury) ([facility]) 8/23/2023    Allergy     Arrhythmia     Asthma     Chest pain     Close exposure to COVID-19 virus 08/06/2020    Diabetes ([facility])     Dyspnea on exertion 03/24/2020    Heart murmur     High cholesterol 02/24/2023    Hyperlipidemia     Hypertension     Overweight     Rheumatic fever with cardiac involvement     patient cant recall but thinks there was a \"tear\" in his heart    Sleep apnea 02/24/2023    Sprain of medial collateral ligament of left knee 06/05/2019     Past Surgical History:   Procedure Laterality Date    OTHER      jaw reconstruction     Family History   Problem Relation Age of Onset    Heart Failure Mother 68        cause of death    Heart Disease Mother     Hypertension Mother     Kidney cancer Father 52        cause of death    No Known Problems Brother     No Known Problems Brother     Cerebral aneurysm Maternal Grandfather 54        cause of death    Hypertension Maternal Grandfather     No Known Problems Child      Social History     Socioeconomic History    Marital status:      Spouse name: Not on file    Number of children: Not on file    Years of education: Not on file    Highest education level: Not on file   Occupational History    Occupation: Retired   Tobacco Use    Smoking status: Never    Smokeless tobacco: Never   Vaping Use    Vaping status: Never Used   Substance and Sexual Activity    Alcohol use: No    Drug use: No    Sexual activity: Yes     Partners: Female   Other " "Topics Concern    Not on file   Social History Narrative    Not on file     Social Determinants of Health     Financial Resource Strain: Not on file   Food Insecurity: Not on file   Transportation Needs: Not on file   Physical Activity: Not on file   Stress: Not on file   Social Connections: Not on file   Intimate Partner Violence: Not on file   Housing Stability: Not on file     Allergies   Allergen Reactions    Pcn [Penicillins] Anaphylaxis     Outpatient Encounter Medications as of 6/12/2024   Medication Sig Dispense Refill    lisinopril (PRINIVIL) 20 MG Tab Take 1 Tablet by mouth every day. 60 Tablet 2    QVAR REDIHALER 80 MCG/ACT inhaler Inhale 1 Puff 2 times a day. Patient reports only using as needed. 1 Each 0    PROAIR  (90 Base) MCG/ACT Aero Soln inhalation aerosol Inhale 1-2 Puffs every four hours as needed for Shortness of Breath. 8.5 g 6    aspirin 81 MG EC tablet Take 81 mg by mouth every day.      Cholecalciferol Liquid Take 5,000 Units by mouth every day. (Patient not taking: Reported on 12/12/2023)      atorvastatin (LIPITOR) 40 MG Tab TAKE ONE TABLET BY MOUTH EVERY EVENING (Patient not taking: Reported on 11/10/2023) 90 Tablet 3     No facility-administered encounter medications on file as of 6/12/2024.     ROS           Objective     /80 (BP Location: Left arm, Patient Position: Sitting, BP Cuff Size: Adult)   Pulse 91   Resp 12   Ht 1.676 m (5' 6\")   Wt 103 kg (227 lb)   SpO2 94%   BMI 36.64 kg/m²     Physical Exam  Constitutional:       Appearance: He is well-developed.   HENT:      Head: Normocephalic and atraumatic.   Cardiovascular:      Rate and Rhythm: Normal rate and regular rhythm.      Heart sounds: No murmur heard.     No friction rub. No gallop.   Pulmonary:      Effort: Pulmonary effort is normal.      Breath sounds: Normal breath sounds.   Abdominal:      Palpations: Abdomen is soft.   Musculoskeletal:         General: Normal range of motion.      Cervical back: " Normal range of motion and neck supple.   Skin:     General: Skin is warm and dry.   Neurological:      Mental Status: He is alert and oriented to person, place, and time.   Psychiatric:         Behavior: Behavior normal.         Thought Content: Thought content normal.         Judgment: Judgment normal.                Assessment & Plan     1. PVC's (premature ventricular contractions)  EKG      2. H/O cardiac radiofrequency ablation, 2/28/23. Dr VELASQUEZ Evans        3. Essential hypertension        4. Mixed hyperlipidemia        5. Obesity (BMI 30-39.9)        6. MAGDALENA (obstructive sleep apnea)            Medical Decision Making: Today's Assessment/Status/Plan:   1.  Atypical chest pain some reassurance.  2.  Hypertension continue regimen.  3.  PVC ablation no evidence of significant recurrence  4.  Obesity work on weight loss.  5.  Follow-up with me in 1 yr.

## 2024-06-18 ENCOUNTER — OFFICE VISIT (OUTPATIENT)
Dept: MEDICAL GROUP | Facility: PHYSICIAN GROUP | Age: 54
End: 2024-06-18
Payer: COMMERCIAL

## 2024-06-18 VITALS
DIASTOLIC BLOOD PRESSURE: 74 MMHG | SYSTOLIC BLOOD PRESSURE: 116 MMHG | RESPIRATION RATE: 16 BRPM | BODY MASS INDEX: 36.42 KG/M2 | OXYGEN SATURATION: 96 % | WEIGHT: 226.63 LBS | HEIGHT: 66 IN | TEMPERATURE: 97.7 F | HEART RATE: 92 BPM

## 2024-06-18 DIAGNOSIS — K76.0 STEATOSIS OF LIVER: ICD-10-CM

## 2024-06-18 PROCEDURE — 3074F SYST BP LT 130 MM HG: CPT | Performed by: STUDENT IN AN ORGANIZED HEALTH CARE EDUCATION/TRAINING PROGRAM

## 2024-06-18 PROCEDURE — 99213 OFFICE O/P EST LOW 20 MIN: CPT | Performed by: STUDENT IN AN ORGANIZED HEALTH CARE EDUCATION/TRAINING PROGRAM

## 2024-06-18 PROCEDURE — 3078F DIAST BP <80 MM HG: CPT | Performed by: STUDENT IN AN ORGANIZED HEALTH CARE EDUCATION/TRAINING PROGRAM

## 2024-06-18 RX ORDER — TIZANIDINE 4 MG/1
TABLET ORAL
COMMUNITY
Start: 2024-05-25 | End: 2024-06-18

## 2024-06-18 ASSESSMENT — FIBROSIS 4 INDEX: FIB4 SCORE: 2.76

## 2024-06-19 ENCOUNTER — APPOINTMENT (OUTPATIENT)
Dept: MEDICAL GROUP | Facility: PHYSICIAN GROUP | Age: 54
End: 2024-06-19
Payer: COMMERCIAL

## 2024-06-19 NOTE — PROGRESS NOTES
Verbal Consent given for SHALA recording software    HISTORY OF PRESENT ILLNESS: Zuhair is a pleasant 53 y.o. male, established patient who presents today to discuss medical problems as listed below:    History of Present Illness  The patient is a 53-year-old male here for a follow-up. I last saw him in 11/2023.    The patient was previously prescribed metformin, however, he discontinued its use as per his physician's advice.  He has been engaging in physical activity, including walking and yard work, in an 18-hour fasting period, with a 6-hour fasting window. Despite not experiencing weight loss, he believes he has gained some muscle. He denies experiencing chest pain during physical exercise.           Current Outpatient Medications Ordered in Epic   Medication Sig Dispense Refill    lisinopril (PRINIVIL) 20 MG Tab Take 1 Tablet by mouth every day. 60 Tablet 2    PROAIR  (90 Base) MCG/ACT Aero Soln inhalation aerosol Inhale 1-2 Puffs every four hours as needed for Shortness of Breath. 8.5 g 6    aspirin 81 MG EC tablet Take 81 mg by mouth every day.      QVAR REDIHALER 80 MCG/ACT inhaler Inhale 1 Puff 2 times a day. Patient reports only using as needed. 1 Each 0     No current [facility]-ordered facility-administered medications on file.       Review of systems:  Per HPI    Patient Active Problem List    Diagnosis Date Noted    Steatosis of liver 06/18/2024    Healthcare maintenance 11/10/2023    Acute bilateral low back pain with bilateral sciatica 07/14/2023    Elevated liver enzymes 05/05/2023    H/O cardiac radiofrequency ablation, 2/28/23. Dr VELASQUEZ Evans 03/23/2023    Obesity (BMI 30-39.9) 01/10/2023    Otitis media follow-up, not resolved, right 12/30/2022    PVC's (premature ventricular contractions) 03/24/2020    Chronic left shoulder pain 01/27/2020    Mixed hyperlipidemia 01/27/2020    Class 2 severe obesity due to excess calories with serious comorbidity and body mass index (BMI) of 35.0 to 35.9 in adult  "(Formerly [facility]) 06/05/2018    Reactive airway disease without complication 10/06/2016    MAGDALENA (obstructive sleep apnea) 10/06/2016    Essential hypertension 10/06/2016     Past Surgical History:   Procedure Laterality Date    OTHER      jaw reconstruction     Social History     Tobacco Use    Smoking status: Never    Smokeless tobacco: Never   Vaping Use    Vaping status: Never Used   Substance Use Topics    Alcohol use: No    Drug use: No      Family History   Problem Relation Age of Onset    Heart Failure Mother 68        cause of death    Heart Disease Mother     Hypertension Mother     Kidney cancer Father 52        cause of death    No Known Problems Brother     No Known Problems Brother     Cerebral aneurysm Maternal Grandfather 54        cause of death    Hypertension Maternal Grandfather     No Known Problems Child      Current Outpatient Medications   Medication Sig Dispense Refill    lisinopril (PRINIVIL) 20 MG Tab Take 1 Tablet by mouth every day. 60 Tablet 2    PROAIR  (90 Base) MCG/ACT Aero Soln inhalation aerosol Inhale 1-2 Puffs every four hours as needed for Shortness of Breath. 8.5 g 6    aspirin 81 MG EC tablet Take 81 mg by mouth every day.      QVAR REDIHALER 80 MCG/ACT inhaler Inhale 1 Puff 2 times a day. Patient reports only using as needed. 1 Each 0     No current facility-administered medications for this visit.       Allergies:  Allergies   Allergen Reactions    Pcn [Penicillins] Anaphylaxis       Allergies, past medical history, past surgical history, family history, social history reviewed and updated.    Objective:    /74   Pulse 92   Temp 36.5 °C (97.7 °F) (Temporal)   Resp 16   Ht 1.676 m (5' 6\")   Wt 103 kg (226 lb 10.1 oz)   SpO2 96%   BMI 36.58 kg/m²    Body mass index is 36.58 kg/m².    Physical exam:  General: Normal appearance, no acute distress, not ill-appearing  HEENT: EOM intact, conjunctiva normal limits, negative right/left eye discharge.  Sclera " anicteric  Cardiovascular: Normal rate and rhythm, no murmurs  Pulmonary: No respiratory distress, no wheezing, no rales, breath sounds normal.  Musculoskeletal: No edema bilaterally  Skin: Warm, dry, no lesions  Neurological: No focal deficits, normal gait  Psychiatric: Mood within normal limits    Assessment/Plan:    Assessment & Plan  1. Elevated glucose  Normal A1c  On review of his records there has been no elevated A1c records in his chart.  I will delete the diagnosis of diabetes and lead to care gaps recommendations.  Continue with low-carb diet, frequent exercise.    2.  Liver cirrhosis, elevated liver enzymes.  He is following up with a gastroenterologist to assess the etiology of this.  Latest liver enzymes are within normal limits.  Continue follow-up with gastroenterology for monitoring.    Follow-up  A follow-up appointment is scheduled for 6 months from now, with the provision for an earlier visit if necessary.       Problem List Items Addressed This Visit       Steatosis of liver       Return in about 6 months (around 12/18/2024), or if symptoms worsen or fail to improve.

## 2024-07-02 DIAGNOSIS — I10 ESSENTIAL HYPERTENSION: ICD-10-CM

## 2024-07-02 RX ORDER — LISINOPRIL 20 MG/1
20 TABLET ORAL DAILY
Qty: 60 TABLET | Refills: 2 | Status: SHIPPED | OUTPATIENT
Start: 2024-07-02

## 2024-07-02 RX ORDER — CHOLECALCIFEROL (VITAMIN D3) 1250 MCG
CAPSULE ORAL
COMMUNITY
Start: 2024-06-14

## 2024-09-05 ENCOUNTER — OFFICE VISIT (OUTPATIENT)
Dept: SLEEP MEDICINE | Facility: MEDICAL CENTER | Age: 54
End: 2024-09-05
Attending: INTERNAL MEDICINE
Payer: COMMERCIAL

## 2024-09-05 VITALS
WEIGHT: 227 LBS | OXYGEN SATURATION: 93 % | SYSTOLIC BLOOD PRESSURE: 132 MMHG | DIASTOLIC BLOOD PRESSURE: 66 MMHG | HEART RATE: 91 BPM | BODY MASS INDEX: 35.63 KG/M2 | HEIGHT: 67 IN

## 2024-09-05 DIAGNOSIS — J45.20 MILD INTERMITTENT REACTIVE AIRWAY DISEASE WITHOUT COMPLICATION: ICD-10-CM

## 2024-09-05 DIAGNOSIS — J45.21 MILD INTERMITTENT ASTHMA WITH ACUTE EXACERBATION: ICD-10-CM

## 2024-09-05 PROCEDURE — 3075F SYST BP GE 130 - 139MM HG: CPT | Performed by: INTERNAL MEDICINE

## 2024-09-05 PROCEDURE — 99214 OFFICE O/P EST MOD 30 MIN: CPT | Performed by: INTERNAL MEDICINE

## 2024-09-05 PROCEDURE — 3078F DIAST BP <80 MM HG: CPT | Performed by: INTERNAL MEDICINE

## 2024-09-05 PROCEDURE — 99211 OFF/OP EST MAY X REQ PHY/QHP: CPT | Performed by: INTERNAL MEDICINE

## 2024-09-05 RX ORDER — BECLOMETHASONE DIPROPIONATE HFA 80 UG/1
1 AEROSOL, METERED RESPIRATORY (INHALATION) 2 TIMES DAILY
Qty: 1 EACH | Refills: 11 | Status: SHIPPED | OUTPATIENT
Start: 2024-09-05 | End: 2024-10-05

## 2024-09-05 ASSESSMENT — ENCOUNTER SYMPTOMS
NAUSEA: 0
DEPRESSION: 0
COUGH: 0
FEVER: 0
SHORTNESS OF BREATH: 1
HEADACHES: 0
DIZZINESS: 0
SPUTUM PRODUCTION: 0
PALPITATIONS: 0
CHILLS: 0
VOMITING: 0
WHEEZING: 0

## 2024-09-05 ASSESSMENT — FIBROSIS 4 INDEX: FIB4 SCORE: 2.76

## 2024-09-05 NOTE — ASSESSMENT & PLAN NOTE
Patient reports that he is largely stable on Qvar    Will continue with Qvar, patient is currently using it when he needs it  Continue with albuterol, specifically with exercise  Told patient that if he is having any worsening of his symptoms we can try a combination ICS/LABA.
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
## 2024-09-06 ENCOUNTER — OFFICE VISIT (OUTPATIENT)
Dept: MEDICAL GROUP | Facility: PHYSICIAN GROUP | Age: 54
End: 2024-09-06
Payer: COMMERCIAL

## 2024-09-06 VITALS
HEART RATE: 81 BPM | BODY MASS INDEX: 35.36 KG/M2 | TEMPERATURE: 98.6 F | DIASTOLIC BLOOD PRESSURE: 84 MMHG | HEIGHT: 67 IN | WEIGHT: 225.31 LBS | OXYGEN SATURATION: 97 % | SYSTOLIC BLOOD PRESSURE: 138 MMHG | RESPIRATION RATE: 16 BRPM

## 2024-09-06 DIAGNOSIS — M54.6 ACUTE MIDLINE THORACIC BACK PAIN: ICD-10-CM

## 2024-09-06 PROCEDURE — 1125F AMNT PAIN NOTED PAIN PRSNT: CPT | Performed by: STUDENT IN AN ORGANIZED HEALTH CARE EDUCATION/TRAINING PROGRAM

## 2024-09-06 PROCEDURE — 3075F SYST BP GE 130 - 139MM HG: CPT | Performed by: STUDENT IN AN ORGANIZED HEALTH CARE EDUCATION/TRAINING PROGRAM

## 2024-09-06 PROCEDURE — 99214 OFFICE O/P EST MOD 30 MIN: CPT | Performed by: STUDENT IN AN ORGANIZED HEALTH CARE EDUCATION/TRAINING PROGRAM

## 2024-09-06 PROCEDURE — 3079F DIAST BP 80-89 MM HG: CPT | Performed by: STUDENT IN AN ORGANIZED HEALTH CARE EDUCATION/TRAINING PROGRAM

## 2024-09-06 RX ORDER — CYCLOBENZAPRINE HCL 5 MG
5 TABLET ORAL NIGHTLY PRN
Qty: 10 TABLET | Refills: 0 | Status: SHIPPED | OUTPATIENT
Start: 2024-09-06 | End: 2024-09-27

## 2024-09-06 ASSESSMENT — PAIN SCALES - GENERAL: PAINLEVEL: 7=MODERATE-SEVERE PAIN

## 2024-09-06 ASSESSMENT — FIBROSIS 4 INDEX: FIB4 SCORE: 2.76

## 2024-09-06 NOTE — PROGRESS NOTES
Verbal Consent given for SHALA recording software    HISTORY OF PRESENT ILLNESS: Zuhair is a pleasant 53 y.o. male, established patient who presents today to discuss medical problems as listed below:    History of Present Illness  The patient is a 53-year-old male who presents for an acute visit.    He reports experiencing pain in the center of his thoracic spine, which he attributes to moving heavy objects over the past three weeks. The pain intensified a few days ago. Despite trying aspirin and ibuprofen, he found no relief. He has been resting for the past two days due to the severity of the pain, which he describes as being so intense that it prevents him from sleeping at night. He confirms that there was no specific incident of trauma or fall that could have caused this pain. A chiropractor has previously adjusted his back. He reports no numbness or tingling in his arms.    He had shingles about 5 or 6 years ago.       Current Outpatient Medications Ordered in Epic   Medication Sig Dispense Refill    cyclobenzaprine (FLEXERIL) 5 mg tablet Take 1 Tablet by mouth at bedtime as needed for Moderate Pain. 10 Tablet 0    QVAR REDIHALER 80 MCG/ACT inhaler Inhale 1 Puff 2 times a day for 30 days. Patient reports only using as needed. 1 Each 11    Cholecalciferol (VITAMIN D3) 1.25 MG (91066 UT) Cap       lisinopril (PRINIVIL) 20 MG Tab Take 1 Tablet by mouth every day. 60 Tablet 2    PROAIR  (90 Base) MCG/ACT Aero Soln inhalation aerosol Inhale 1-2 Puffs every four hours as needed for Shortness of Breath. 8.5 g 6    aspirin 81 MG EC tablet Take 81 mg by mouth every day.       No current [facility]-ordered facility-administered medications on file.       Review of systems:  Per HPI    Patient Active Problem List    Diagnosis Date Noted    Steatosis of liver 06/18/2024    Healthcare maintenance 11/10/2023    Acute bilateral low back pain with bilateral sciatica 07/14/2023    Elevated liver enzymes 05/05/2023    H/O cardiac  radiofrequency ablation, 2/28/23. Dr VELASQUEZ Evans 03/23/2023    Obesity (BMI 30-39.9) 01/10/2023    Otitis media follow-up, not resolved, right 12/30/2022    PVC's (premature ventricular contractions) 03/24/2020    Chronic left shoulder pain 01/27/2020    Mixed hyperlipidemia 01/27/2020    Class 2 severe obesity due to excess calories with serious comorbidity and body mass index (BMI) of 35.0 to 35.9 in adult (HCC) 06/05/2018    Reactive airway disease without complication 10/06/2016    MAGDALENA (obstructive sleep apnea) 10/06/2016    Essential hypertension 10/06/2016     Past Surgical History:   Procedure Laterality Date    OTHER      jaw reconstruction     Social History     Tobacco Use    Smoking status: Never    Smokeless tobacco: Never   Vaping Use    Vaping status: Never Used   Substance Use Topics    Alcohol use: No    Drug use: No      Family History   Problem Relation Age of Onset    Heart Failure Mother 68        cause of death    Heart Disease Mother     Hypertension Mother     Kidney cancer Father 52        cause of death    No Known Problems Brother     No Known Problems Brother     Cerebral aneurysm Maternal Grandfather 54        cause of death    Hypertension Maternal Grandfather     No Known Problems Child      Current Outpatient Medications   Medication Sig Dispense Refill    cyclobenzaprine (FLEXERIL) 5 mg tablet Take 1 Tablet by mouth at bedtime as needed for Moderate Pain. 10 Tablet 0    QVAR REDIHALER 80 MCG/ACT inhaler Inhale 1 Puff 2 times a day for 30 days. Patient reports only using as needed. 1 Each 11    Cholecalciferol (VITAMIN D3) 1.25 MG (49781 UT) Cap       lisinopril (PRINIVIL) 20 MG Tab Take 1 Tablet by mouth every day. 60 Tablet 2    PROAIR  (90 Base) MCG/ACT Aero Soln inhalation aerosol Inhale 1-2 Puffs every four hours as needed for Shortness of Breath. 8.5 g 6    aspirin 81 MG EC tablet Take 81 mg by mouth every day.       No current facility-administered medications for this  "visit.       Allergies:  Allergies   Allergen Reactions    Pcn [Penicillins] Anaphylaxis       Allergies, past medical history, past surgical history, family history, social history reviewed and updated.    Objective:    /84   Pulse 81   Temp 37 °C (98.6 °F) (Temporal)   Resp 16   Ht 1.702 m (5' 7\")   Wt 102 kg (225 lb 5 oz)   SpO2 97%   BMI 35.29 kg/m²    Body mass index is 35.29 kg/m².    Physical exam:  General: Normal appearance, no acute distress, not ill-appearing  HEENT: EOM intact, conjunctiva normal limits, negative right/left eye discharge.  Sclera anicteric  Cardiovascular: Normal rate and rhythm, no murmurs  Pulmonary: No respiratory distress, no wheezing, no rales, breath sounds normal.  Musculoskeletal: No edema bilaterally, tenderness to upper/mid thoracic spine  Skin: Warm, dry, no lesions  Neurological: No focal deficits, normal gait  Psychiatric: Mood within normal limits    Assessment/Plan:    Assessment & Plan  1. Thoracic spine pain.  The patient reports acute thoracic spine pain following extensive lifting and moving activities over the past three weeks. He has tried aspirin and ibuprofen without significant relief. There is no numbness, tingling, or trauma reported. The pain is localized to the center of the thoracic spine and worsens at night. An x-ray of the thoracic spine will be ordered to rule out a compression fracture. He is advised to take ibuprofen with food once daily for the next few days. Flexeril 10 mg will be provided for use at night in case of severe pain. He is cautioned against driving or consuming alcohol while on this medication. The use of a heating pad is recommended to alleviate muscle tension. Gentle stretching exercises are also suggested.    2. Health Maintenance.  The patient is advised to get the shingles vaccine due to the risk of severe shingles outbreaks. He expresses reluctance but is educated that the current vaccine is not a live vaccine and does " not worsen the condition.           Problem List Items Addressed This Visit    None  Visit Diagnoses       Acute midline thoracic back pain        Relevant Medications    cyclobenzaprine (FLEXERIL) 5 mg tablet    Other Relevant Orders    DX-THORACIC SPINE-2 VIEWS            Return if symptoms worsen or fail to improve.

## 2024-09-20 ENCOUNTER — OFFICE VISIT (OUTPATIENT)
Dept: SLEEP MEDICINE | Facility: MEDICAL CENTER | Age: 54
End: 2024-09-20
Attending: NURSE PRACTITIONER
Payer: COMMERCIAL

## 2024-09-20 VITALS
HEIGHT: 66 IN | RESPIRATION RATE: 16 BRPM | BODY MASS INDEX: 34.55 KG/M2 | OXYGEN SATURATION: 92 % | DIASTOLIC BLOOD PRESSURE: 84 MMHG | WEIGHT: 215 LBS | SYSTOLIC BLOOD PRESSURE: 140 MMHG | HEART RATE: 85 BPM

## 2024-09-20 DIAGNOSIS — G47.33 OSA (OBSTRUCTIVE SLEEP APNEA): ICD-10-CM

## 2024-09-20 PROCEDURE — 99214 OFFICE O/P EST MOD 30 MIN: CPT | Performed by: NURSE PRACTITIONER

## 2024-09-20 PROCEDURE — 99213 OFFICE O/P EST LOW 20 MIN: CPT | Performed by: NURSE PRACTITIONER

## 2024-09-20 PROCEDURE — 3077F SYST BP >= 140 MM HG: CPT | Performed by: NURSE PRACTITIONER

## 2024-09-20 PROCEDURE — 3079F DIAST BP 80-89 MM HG: CPT | Performed by: NURSE PRACTITIONER

## 2024-09-20 ASSESSMENT — FIBROSIS 4 INDEX: FIB4 SCORE: 2.81

## 2024-09-20 NOTE — PROGRESS NOTES
Chief Complaint   Patient presents with    Follow-Up     SLEEP - ESTABLISHED PT CHART PREP COMPLETED ON 09/11/2024     Last Office Visit 04/26/2023 with Mary fernandez Compliance: No     DME: [facility]    Settings: CPAP 13 cmH2O     Wireless Data? NO, If not wireless contact pt to bring in device.     OAT ? : N/A , DDS who provided OAT? N/A       HPI:  Zuhair Jj is a 54 y.o. year old male here today for follow-up on MAGDALENA f/u.  Patient is retired from the [employer].  Patient lives in [place].  Patient is a former [facility] patient. 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     Patient has a Raven Respironics CPAP machine.  Patient has registered the device and received a replacement Raven Respironics DreamStation 2 CPAP in January 2023.  Currently using a fullface mask.  Denies any difficulty with mask fit or pressures.  Averages approximately 4 hours of sleep secondary to back pain.  Does have muscle relaxers which she occasionally takes from time to time.  Overall states cannot sleep for prolonged period of time due to back pain.  Currently on CPAP at 13 cm/H2O.  Denies any excessive daytime sleepiness, morning headaches, palpitations, concentration or memory problems.    30-day compliance shows 100% use with an average time of 4 hours and 12 minutes and a residual AHI of 5.0.      Sleep history:  Overnight oximetry 1/10/2017 on his current pressures indicate a mean 02 saturation of 96.6%. He did have 1 episode of desaturation. However, it appears his mask may have been off at that time.      PSG titration from 12/10/07 indicated successful titration of nasal CPAP to an optimal pressure of 10 cm with an AHI of 1/h, and average O2 saturation of 94%.     PSG from 11/5/07 indicated an AHI of 15.5 with a low oxygen saturation.     ROS: As per HPI and otherwise negative if not stated.    Past Medical  "History:   Diagnosis Date    Acute bilateral low back pain with bilateral sciatica 07/14/2023    ARACELI (acute kidney injury) (HCC) 08/23/2023    Allergy     Arrhythmia     Asthma     Chest pain     Close exposure to COVID-19 virus 08/06/2020    Cough     Diabetes (HCC)     Dyspnea on exertion 03/24/2020    Heart murmur     High cholesterol 02/24/2023    Hyperlipidemia     Hypertension     Overweight     Painful breathing     Rheumatic fever with cardiac involvement     patient cant recall but thinks there was a \"tear\" in his heart    Shortness of breath     Sleep apnea 02/24/2023    Sprain of medial collateral ligament of left knee 06/05/2019    Sputum production     Wheezing        Past Surgical History:   Procedure Laterality Date    OTHER      jaw reconstruction       Family History   Problem Relation Age of Onset    Heart Failure Mother 68        cause of death    Heart Disease Mother     Hypertension Mother     Kidney cancer Father 52        cause of death    No Known Problems Brother     No Known Problems Brother     Cerebral aneurysm Maternal Grandfather 54        cause of death    Hypertension Maternal Grandfather     No Known Problems Child        Allergies as of 09/20/2024 - Reviewed 09/20/2024   Allergen Reaction Noted    Pcn [penicillins] Anaphylaxis 08/30/2016        Vitals:  BP (!) 140/84 (BP Location: Left arm, Patient Position: Sitting, BP Cuff Size: Adult)   Pulse 85   Resp 16   Ht 1.676 m (5' 6\")   Wt 97.5 kg (215 lb)   SpO2 92%     Current medications as of today   Current Outpatient Medications   Medication Sig Dispense Refill    cyclobenzaprine (FLEXERIL) 5 mg tablet Take 1 Tablet by mouth at bedtime as needed for Moderate Pain. 10 Tablet 0    QVAR REDIHALER 80 MCG/ACT inhaler Inhale 1 Puff 2 times a day for 30 days. Patient reports only using as needed. 1 Each 11    Cholecalciferol (VITAMIN D3) 1.25 MG (85250 UT) Cap       lisinopril (PRINIVIL) 20 MG Tab Take 1 Tablet by mouth every day. 60 " Tablet 2    PROAIR  (90 Base) MCG/ACT Aero Soln inhalation aerosol Inhale 1-2 Puffs every four hours as needed for Shortness of Breath. 8.5 g 6    aspirin 81 MG EC tablet Take 81 mg by mouth every day.       No current facility-administered medications for this visit.         Physical Exam:   Gen:           Alert and oriented, No apparent distress. Mood and affect appropriate, normal interaction with examiner.  Eyes:          PERRL, EOM intact, sclere white, conjunctive moist.  Ears:          Not examined.   Hearing:     Grossly intact.  Nose:          Normal, no lesions or deformities.  Dentition:    Good dentition.  Oropharynx:   Tongue normal, posterior pharynx without erythema or exudate.  Neck:        Supple, trachea midline, no masses.  Respiratory Effort: No intercostal retractions or use of accessory muscles.   Lung Auscultation:      Clear to auscultation bilaterally; no rales, rhonchi or wheezing.  CV:            Regular rate and rhythm. No murmurs, rubs or gallops.  Abd:           Not examined.   Lymphadenopathy: Not examined.  Gait and Station: Normal.  Digits and Nails: No clubbing, cyanosis, petechiae, or nodes.   Cranial Nerves: II-XII grossly intact.  Skin:        No rashes, lesions or ulcers noted.               Ext:           No cyanosis or edema.      Assessment:  1. MAGDALENA (obstructive sleep apnea)  DME Mask and Supplies        Plan:  Patient is using and benefiting from CPAP therapy.  Compliance shows residual AHI of 5.0.  Patient feels that the mask pressure is not high enough at times.  Adjustment made to auto CPAP 11 to 16 cm/H2O.  Patient to follow-up in 90 days for compliance recheck.  Order placed for mask and supplies which will be good for 1 year.    Please note that this dictation was created using voice recognition software. I have made every reasonable attempt to correct obvious errors, but it is possible there are errors of grammar and possibly content that I did not discover  before finalizing the note.

## 2024-09-27 ENCOUNTER — OFFICE VISIT (OUTPATIENT)
Dept: MEDICAL GROUP | Facility: PHYSICIAN GROUP | Age: 54
End: 2024-09-27
Payer: COMMERCIAL

## 2024-09-27 VITALS
RESPIRATION RATE: 14 BRPM | HEART RATE: 91 BPM | DIASTOLIC BLOOD PRESSURE: 86 MMHG | BODY MASS INDEX: 34.84 KG/M2 | TEMPERATURE: 97.6 F | WEIGHT: 222 LBS | SYSTOLIC BLOOD PRESSURE: 130 MMHG | HEIGHT: 67 IN | OXYGEN SATURATION: 93 %

## 2024-09-27 DIAGNOSIS — G89.29 CHRONIC MIDLINE THORACIC BACK PAIN: ICD-10-CM

## 2024-09-27 DIAGNOSIS — M54.6 CHRONIC MIDLINE THORACIC BACK PAIN: ICD-10-CM

## 2024-09-27 DIAGNOSIS — M54.6 ACUTE MIDLINE THORACIC BACK PAIN: ICD-10-CM

## 2024-09-27 PROCEDURE — 3075F SYST BP GE 130 - 139MM HG: CPT | Performed by: STUDENT IN AN ORGANIZED HEALTH CARE EDUCATION/TRAINING PROGRAM

## 2024-09-27 PROCEDURE — 3079F DIAST BP 80-89 MM HG: CPT | Performed by: STUDENT IN AN ORGANIZED HEALTH CARE EDUCATION/TRAINING PROGRAM

## 2024-09-27 PROCEDURE — 99214 OFFICE O/P EST MOD 30 MIN: CPT | Performed by: STUDENT IN AN ORGANIZED HEALTH CARE EDUCATION/TRAINING PROGRAM

## 2024-09-27 RX ORDER — TRAMADOL HYDROCHLORIDE 50 MG/1
50 TABLET ORAL EVERY 12 HOURS PRN
Qty: 10 TABLET | Refills: 0 | Status: SHIPPED | OUTPATIENT
Start: 2024-09-27 | End: 2024-10-07

## 2024-09-27 ASSESSMENT — FIBROSIS 4 INDEX: FIB4 SCORE: 2.81

## 2024-09-27 NOTE — PROGRESS NOTES
Verbal Consent given for [employer] recording software    HISTORY OF PRESENT ILLNESS: Zuhair is a pleasant 54 y.o. male, established patient who presents today to discuss medical problems as listed below:    History of Present Illness  The patient is a 54-year-old male here for an acute visit due to significant lower back pain.    He has been experiencing mid-back pain for some time, which was exacerbated five days ago while preparing dinner. Despite his hopes for improvement, the pain persists. He describes the current pain as unbearable, even though he generally has a high pain threshold.    An x-ray conducted two weeks ago revealed no fractures. He has a history of arthritis and bone erosion, as well as disc issues. He has previously taken Flexeril but discontinued it due to stomach upset. Currently, he is managing his pain with aspirin. He has attempted stretching exercises without success and has undergone physical therapy in the past.    He reports no numbness or tingling in his legs and no loss of bladder or bowel control. His sleep is limited to four hours per night. He has lost 2.5 inches in height over recent years. He underwent physical therapy for neck pain a few years ago.    He does not use recreational drugs or alcohol.       Current Outpatient Medications Ordered in Epic   Medication Sig Dispense Refill    traMADol (ULTRAM) 50 MG Tab Take 1 Tablet by mouth every 12 hours as needed for Severe Pain for up to 10 days. 10 Tablet 0    Diclofenac Sodium 1 % Cream Apply 1 Application topically 2 times a day as needed (back pain). 120 g 1    QVAR REDIHALER 80 MCG/ACT inhaler Inhale 1 Puff 2 times a day for 30 days. Patient reports only using as needed. 1 Each 11    Cholecalciferol (VITAMIN D3) 1.25 MG (93176 UT) Cap       lisinopril (PRINIVIL) 20 MG Tab Take 1 Tablet by mouth every day. 60 Tablet 2    PROAIR  (90 Base) MCG/ACT Aero Soln inhalation aerosol Inhale 1-2 Puffs every four hours as needed for  Shortness of Breath. 8.5 g 6    aspirin 81 MG EC tablet Take 81 mg by mouth every day.       No current [facility]-ordered facility-administered medications on file.       Review of systems:  Per HPI    Patient Active Problem List    Diagnosis Date Noted    Steatosis of liver 06/18/2024    Healthcare maintenance 11/10/2023    Acute bilateral low back pain with bilateral sciatica 07/14/2023    Elevated liver enzymes 05/05/2023    H/O cardiac radiofrequency ablation, 2/28/23. Dr VELASQUEZ Evans 03/23/2023    Obesity (BMI 30-39.9) 01/10/2023    Otitis media follow-up, not resolved, right 12/30/2022    PVC's (premature ventricular contractions) 03/24/2020    Chronic left shoulder pain 01/27/2020    Mixed hyperlipidemia 01/27/2020    Class 2 severe obesity due to excess calories with serious comorbidity and body mass index (BMI) of 35.0 to 35.9 in adult (HCC) 06/05/2018    Reactive airway disease without complication 10/06/2016    MAGDALENA (obstructive sleep apnea) 10/06/2016    Essential hypertension 10/06/2016     Past Surgical History:   Procedure Laterality Date    OTHER      jaw reconstruction     Social History     Tobacco Use    Smoking status: Never    Smokeless tobacco: Never   Vaping Use    Vaping status: Never Used   Substance Use Topics    Alcohol use: No    Drug use: No      Family History   Problem Relation Age of Onset    Heart Failure Mother 68        cause of death    Heart Disease Mother     Hypertension Mother     Kidney cancer Father 52        cause of death    No Known Problems Brother     No Known Problems Brother     Cerebral aneurysm Maternal Grandfather 54        cause of death    Hypertension Maternal Grandfather     No Known Problems Child      Current Outpatient Medications   Medication Sig Dispense Refill    traMADol (ULTRAM) 50 MG Tab Take 1 Tablet by mouth every 12 hours as needed for Severe Pain for up to 10 days. 10 Tablet 0    Diclofenac Sodium 1 % Cream Apply 1 Application topically 2 times a day as  "needed (back pain). 120 g 1    QVAR REDIHALER 80 MCG/ACT inhaler Inhale 1 Puff 2 times a day for 30 days. Patient reports only using as needed. 1 Each 11    Cholecalciferol (VITAMIN D3) 1.25 MG (28346 UT) Cap       lisinopril (PRINIVIL) 20 MG Tab Take 1 Tablet by mouth every day. 60 Tablet 2    PROAIR  (90 Base) MCG/ACT Aero Soln inhalation aerosol Inhale 1-2 Puffs every four hours as needed for Shortness of Breath. 8.5 g 6    aspirin 81 MG EC tablet Take 81 mg by mouth every day.       No current facility-administered medications for this visit.       Allergies:  Allergies   Allergen Reactions    Pcn [Penicillins] Anaphylaxis       Allergies, past medical history, past surgical history, family history, social history reviewed and updated.    Objective:    /86   Pulse 91   Temp 36.4 °C (97.6 °F) (Temporal)   Resp 14   Ht 1.702 m (5' 7\")   Wt 101 kg (222 lb 0.1 oz)   SpO2 93%   BMI 34.77 kg/m²    Body mass index is 34.77 kg/m².    Physical exam:  General: Normal appearance, no acute distress, not ill-appearing  HEENT: EOM intact, conjunctiva normal limits, negative right/left eye discharge.  Sclera anicteric  Musculoskeletal: midline thoracic back pain no tenderness or step offs.     Assessment/Plan:    Assessment & Plan  1. Acute on chronic midline thoracic back pain.  The pain started 5 days ago after lifting while making dinner. An x-ray done on 09/06/2024 showed mild degenerative changes but no fractures. An MRI of the thoracic spine done in August 2023 showed no significant findings. He is experiencing severe discomfort and pain but has no loss of bowel or bladder control. He was advised to go to the ER if he experiences any loss of bowel or bladder control. He has tried physical therapy in the past, which did not help much. Tramadol 10 days has been prescribed for pain management temporarily. Diclofenac cream was also recommended as a less invasive option. A referral to an interventional " pain management specialist has been made to explore potential injections for pain relief.         Problem List Items Addressed This Visit    None  Visit Diagnoses       Acute midline thoracic back pain        Relevant Medications    traMADol (ULTRAM) 50 MG Tab    Diclofenac Sodium 1 % Cream    Chronic midline thoracic back pain        Relevant Medications    traMADol (ULTRAM) 50 MG Tab    Other Relevant Orders    Referral to Pain Management            Return in about 3 months (around 12/27/2024), or if symptoms worsen or fail to improve.

## 2024-10-03 ENCOUNTER — OFFICE VISIT (OUTPATIENT)
Dept: PHYSICAL MEDICINE AND REHAB | Facility: MEDICAL CENTER | Age: 54
End: 2024-10-03
Payer: COMMERCIAL

## 2024-10-03 VITALS
DIASTOLIC BLOOD PRESSURE: 78 MMHG | HEIGHT: 66 IN | SYSTOLIC BLOOD PRESSURE: 162 MMHG | WEIGHT: 220 LBS | BODY MASS INDEX: 35.36 KG/M2 | OXYGEN SATURATION: 94 % | TEMPERATURE: 97 F | HEART RATE: 105 BPM

## 2024-10-03 DIAGNOSIS — Z71.82 EXERCISE COUNSELING: ICD-10-CM

## 2024-10-03 DIAGNOSIS — E66.9 OBESITY (BMI 30-39.9): ICD-10-CM

## 2024-10-03 DIAGNOSIS — R03.0 ELEVATED BLOOD PRESSURE READING: ICD-10-CM

## 2024-10-03 DIAGNOSIS — M47.814 FACET ARTHROPATHY, THORACIC: ICD-10-CM

## 2024-10-03 DIAGNOSIS — M47.9 SPONDYLOSIS: ICD-10-CM

## 2024-10-03 PROCEDURE — 3077F SYST BP >= 140 MM HG: CPT | Performed by: GENERAL PRACTICE

## 2024-10-03 PROCEDURE — 1125F AMNT PAIN NOTED PAIN PRSNT: CPT | Performed by: GENERAL PRACTICE

## 2024-10-03 PROCEDURE — 3078F DIAST BP <80 MM HG: CPT | Performed by: GENERAL PRACTICE

## 2024-10-03 PROCEDURE — 99204 OFFICE O/P NEW MOD 45 MIN: CPT | Performed by: GENERAL PRACTICE

## 2024-10-03 ASSESSMENT — PATIENT HEALTH QUESTIONNAIRE - PHQ9
5. POOR APPETITE OR OVEREATING: 3 - NEARLY EVERY DAY
CLINICAL INTERPRETATION OF PHQ2 SCORE: 3
SUM OF ALL RESPONSES TO PHQ QUESTIONS 1-9: 16

## 2024-10-03 ASSESSMENT — PAIN SCALES - GENERAL: PAINLEVEL: 8=MODERATE-SEVERE PAIN

## 2024-10-03 ASSESSMENT — FIBROSIS 4 INDEX: FIB4 SCORE: 2.81

## 2024-10-11 ENCOUNTER — OFFICE VISIT (OUTPATIENT)
Dept: MEDICAL GROUP | Facility: PHYSICIAN GROUP | Age: 54
End: 2024-10-11
Payer: COMMERCIAL

## 2024-10-11 VITALS
HEART RATE: 98 BPM | TEMPERATURE: 98.6 F | OXYGEN SATURATION: 96 % | DIASTOLIC BLOOD PRESSURE: 74 MMHG | HEIGHT: 66 IN | BODY MASS INDEX: 36.28 KG/M2 | SYSTOLIC BLOOD PRESSURE: 122 MMHG | RESPIRATION RATE: 14 BRPM | WEIGHT: 225.75 LBS

## 2024-10-11 DIAGNOSIS — Z23 NEED FOR VACCINATION: ICD-10-CM

## 2024-10-11 DIAGNOSIS — M79.10 MYALGIA: ICD-10-CM

## 2024-10-11 PROCEDURE — 90471 IMMUNIZATION ADMIN: CPT | Performed by: STUDENT IN AN ORGANIZED HEALTH CARE EDUCATION/TRAINING PROGRAM

## 2024-10-11 PROCEDURE — 99213 OFFICE O/P EST LOW 20 MIN: CPT | Mod: 25 | Performed by: STUDENT IN AN ORGANIZED HEALTH CARE EDUCATION/TRAINING PROGRAM

## 2024-10-11 PROCEDURE — 3078F DIAST BP <80 MM HG: CPT | Performed by: STUDENT IN AN ORGANIZED HEALTH CARE EDUCATION/TRAINING PROGRAM

## 2024-10-11 PROCEDURE — 3074F SYST BP LT 130 MM HG: CPT | Performed by: STUDENT IN AN ORGANIZED HEALTH CARE EDUCATION/TRAINING PROGRAM

## 2024-10-11 PROCEDURE — 90656 IIV3 VACC NO PRSV 0.5 ML IM: CPT | Performed by: STUDENT IN AN ORGANIZED HEALTH CARE EDUCATION/TRAINING PROGRAM

## 2024-10-11 ASSESSMENT — FIBROSIS 4 INDEX: FIB4 SCORE: 2.81

## 2024-10-16 ENCOUNTER — OFFICE VISIT (OUTPATIENT)
Dept: PHYSICAL MEDICINE AND REHAB | Facility: MEDICAL CENTER | Age: 54
End: 2024-10-16
Payer: COMMERCIAL

## 2024-10-16 VITALS
BODY MASS INDEX: 36.16 KG/M2 | DIASTOLIC BLOOD PRESSURE: 74 MMHG | WEIGHT: 225 LBS | OXYGEN SATURATION: 94 % | TEMPERATURE: 97.8 F | HEART RATE: 89 BPM | HEIGHT: 66 IN | SYSTOLIC BLOOD PRESSURE: 138 MMHG

## 2024-10-16 DIAGNOSIS — M47.814 FACET ARTHROPATHY, THORACIC: ICD-10-CM

## 2024-10-16 DIAGNOSIS — M79.10 MYALGIA: ICD-10-CM

## 2024-10-16 DIAGNOSIS — Z71.82 EXERCISE COUNSELING: ICD-10-CM

## 2024-10-16 DIAGNOSIS — M47.9 SPONDYLOSIS: ICD-10-CM

## 2024-10-16 DIAGNOSIS — E66.9 OBESITY (BMI 30-39.9): ICD-10-CM

## 2024-10-16 PROCEDURE — 3075F SYST BP GE 130 - 139MM HG: CPT | Performed by: GENERAL PRACTICE

## 2024-10-16 PROCEDURE — 20552 NJX 1/MLT TRIGGER POINT 1/2: CPT | Performed by: GENERAL PRACTICE

## 2024-10-16 PROCEDURE — 1125F AMNT PAIN NOTED PAIN PRSNT: CPT | Performed by: GENERAL PRACTICE

## 2024-10-16 PROCEDURE — 3078F DIAST BP <80 MM HG: CPT | Performed by: GENERAL PRACTICE

## 2024-10-16 PROCEDURE — 76942 ECHO GUIDE FOR BIOPSY: CPT | Performed by: GENERAL PRACTICE

## 2024-10-16 PROCEDURE — 99213 OFFICE O/P EST LOW 20 MIN: CPT | Mod: 25 | Performed by: GENERAL PRACTICE

## 2024-10-16 RX ORDER — DEXAMETHASONE SODIUM PHOSPHATE 4 MG/ML
4 INJECTION, SOLUTION INTRA-ARTICULAR; INTRALESIONAL; INTRAMUSCULAR; INTRAVENOUS; SOFT TISSUE ONCE
Status: COMPLETED | OUTPATIENT
Start: 2024-10-16 | End: 2024-10-16

## 2024-10-16 RX ORDER — BUPIVACAINE HYDROCHLORIDE 5 MG/ML
4 INJECTION, SOLUTION EPIDURAL; INTRACAUDAL ONCE
Status: COMPLETED | OUTPATIENT
Start: 2024-10-16 | End: 2024-10-16

## 2024-10-16 RX ADMIN — Medication 4 ML: at 14:02

## 2024-10-16 RX ADMIN — BUPIVACAINE HYDROCHLORIDE 4 ML: 5 INJECTION, SOLUTION EPIDURAL; INTRACAUDAL at 14:01

## 2024-10-16 RX ADMIN — DEXAMETHASONE SODIUM PHOSPHATE 4 MG: 4 INJECTION, SOLUTION INTRA-ARTICULAR; INTRALESIONAL; INTRAMUSCULAR; INTRAVENOUS; SOFT TISSUE at 14:02

## 2024-10-16 ASSESSMENT — PATIENT HEALTH QUESTIONNAIRE - PHQ9
5. POOR APPETITE OR OVEREATING: 3 - NEARLY EVERY DAY
SUM OF ALL RESPONSES TO PHQ QUESTIONS 1-9: 15
CLINICAL INTERPRETATION OF PHQ2 SCORE: 3

## 2024-10-16 ASSESSMENT — FIBROSIS 4 INDEX: FIB4 SCORE: 2.81

## 2024-10-16 ASSESSMENT — PAIN SCALES - GENERAL: PAINLEVEL: 9=SEVERE PAIN

## 2024-10-30 ENCOUNTER — APPOINTMENT (OUTPATIENT)
Dept: PHYSICAL MEDICINE AND REHAB | Facility: MEDICAL CENTER | Age: 54
End: 2024-10-30
Payer: COMMERCIAL

## 2024-12-03 ENCOUNTER — APPOINTMENT (OUTPATIENT)
Dept: PHYSICAL MEDICINE AND REHAB | Facility: MEDICAL CENTER | Age: 54
End: 2024-12-03
Payer: COMMERCIAL

## 2024-12-03 VITALS
WEIGHT: 221.56 LBS | TEMPERATURE: 98.2 F | HEART RATE: 76 BPM | BODY MASS INDEX: 35.61 KG/M2 | SYSTOLIC BLOOD PRESSURE: 144 MMHG | HEIGHT: 66 IN | OXYGEN SATURATION: 94 % | DIASTOLIC BLOOD PRESSURE: 80 MMHG

## 2024-12-03 DIAGNOSIS — M47.9 SPONDYLOSIS: ICD-10-CM

## 2024-12-03 DIAGNOSIS — Z71.82 EXERCISE COUNSELING: ICD-10-CM

## 2024-12-03 DIAGNOSIS — M79.10 MYALGIA: ICD-10-CM

## 2024-12-03 DIAGNOSIS — M47.814 FACET ARTHROPATHY, THORACIC: ICD-10-CM

## 2024-12-03 DIAGNOSIS — E66.9 OBESITY (BMI 30-39.9): ICD-10-CM

## 2024-12-03 PROCEDURE — 3077F SYST BP >= 140 MM HG: CPT | Performed by: GENERAL PRACTICE

## 2024-12-03 PROCEDURE — 99214 OFFICE O/P EST MOD 30 MIN: CPT | Performed by: GENERAL PRACTICE

## 2024-12-03 PROCEDURE — 3079F DIAST BP 80-89 MM HG: CPT | Performed by: GENERAL PRACTICE

## 2024-12-03 PROCEDURE — 1125F AMNT PAIN NOTED PAIN PRSNT: CPT | Performed by: GENERAL PRACTICE

## 2024-12-03 ASSESSMENT — FIBROSIS 4 INDEX: FIB4 SCORE: 2.81

## 2024-12-03 ASSESSMENT — PATIENT HEALTH QUESTIONNAIRE - PHQ9
SUM OF ALL RESPONSES TO PHQ QUESTIONS 1-9: 7
CLINICAL INTERPRETATION OF PHQ2 SCORE: 1
5. POOR APPETITE OR OVEREATING: 1 - SEVERAL DAYS

## 2024-12-03 ASSESSMENT — PAIN SCALES - GENERAL: PAINLEVEL_OUTOF10: 7=MODERATE-SEVERE PAIN

## 2024-12-03 NOTE — PATIENT INSTRUCTIONS
intercostal muscle exercises  -[email or web address]  -[email or web address]  -[email or web address]

## 2024-12-03 NOTE — PROGRESS NOTES
Physiatry (Physical Medicine and  Rehabilitation)       Patient Name: Zuhair Jj   Patient : 1970  PCP: Ellis Laughlin D.O.  MRN: [id number]     Date of service: See epic    Referring provider: Ellis Laughlin D.O.    CHIEF COMPLAINT  Chief Complaint   Patient presents with    Follow-Up     Back Pain         Zuhair Jj is a 54 y.o. pleasant male  who presents today with Diagnoses of Myalgia, Exercise counseling, Obesity (BMI 30-39.9), Spondylosis, and Facet arthropathy, thoracic were pertinent to this visit.      Medical records review:  I reviewed the note from the referring provider Ellis Laughlin D.O. including the note dated 24, 24.    Prior Relevant MSK/Interventional Procedures:   10/16/24 RIGHT thoracic paraspinal muscles at T5, T6 T7, and T8 trigger point injections w/CS    HPI:     The patient presented for evaluation of dorsalgia which he reported pain relief since the injection.  He has not required additional medications.      However, he is experiencing pain at the right mid axillary of his ribs with referred pain across his chest.  This is a chronic issue but has had an acute flare as he was moving boxes and is leaving for [place] for a couple weeks off of his.  His pain improves with stretching and the use of a lidocaine spray.  He has tried topical Lidoderm and his prior oral medications which have been ineffective.  His pain level is roughly a 5-6 out of 10 on palpation but can worsen to a 6 or 7 out of 10 with torsional movements.  The pain is described as sharp and jagged.  He does have additional NSAIDs and opioid at home but avoids opioids due to the adverse effects.  He does have the meloxicam and ibuprofen available and is aware not to take both concurrently. He reports no dyspnea, cough, or chest pain, except for pain related to bronchial irritation.    Prior Hx  Patient has not had prior surgery.   Patient has tried the following  medications:  tramodol  Ibuprofen  Aspirin  Flexeril  Toradol injection  Voltaren cream     Previous or Current Therapeutic modalities and interventional therapies:  -Prior prednisone: yes, not for back pain   -Home exercises:?yes    -Heat and ice:?yes, ice more effective. Whirpool tub not effetive  -Topicals:?yes   -TENS unit:?yes, did help    -Chiropractic manipulation:?yes   -Acupuncture:?no   -Massage:?no   -massage device some effect    ROS:   Fever, Chills, Sweats: Denies  Involuntary Weight Loss: Denies  Bowel/bladder issues: denies   See Hpi  All other systems reviewed and negative.     OCCUPATIONAL history: retired    HOBBIES/ACTIVITIES:     GOALS OF TREATMENT: Symptom/Pain relief. improve function.      Psychological testing for pain as depression and pain commonly coexist and need to both be treated.     Opioid Risk Score: 0      Interpretation of Opioid Risk Score   Score 0-3 = Low risk of abuse. Do UDS at least once per year.  Score 4-7 = Moderate risk of abuse. Do UDS 1-4 times per year.  Score 8+ = High risk of abuse. Refer to specialist.    PHQ      2/6/2024     1:50 PM 10/3/2024     3:40 PM 10/16/2024     1:20 PM   Depression Screen (PHQ-2/PHQ-9)   PHQ-2 Total Score 0 3 3   PHQ-9 Total Score  16 15       Interpretation of PHQ-9 Total Score   Score Severity   1-4 No Depression   5-9 Mild Depression   10-14 Moderate Depression   15-19 Moderately Severe Depression   20-27 Severe Depression      PMHx:   Past Medical History:   Diagnosis Date    Acute bilateral low back pain with bilateral sciatica 07/14/2023    ARACELI (acute kidney injury) (HCC) 08/23/2023    Allergy     Arrhythmia     Asthma     Chest pain     Close exposure to COVID-19 virus 08/06/2020    Cough     Diabetes (HCC)     Dyspnea on exertion 03/24/2020    Heart murmur     High cholesterol 02/24/2023    Hyperlipidemia     Hypertension     Overweight     Painful breathing     Rheumatic fever with cardiac involvement     patient  "cant recall but thinks there was a \"tear\" in his heart    Shortness of breath     Sleep apnea 02/24/2023    Sprain of medial collateral ligament of left knee 06/05/2019    Sputum production     Wheezing        PSHx:   Past Surgical History:   Procedure Laterality Date    OTHER      jaw reconstruction       Family history   Family History   Problem Relation Age of Onset    Heart Failure Mother 68        cause of death    Heart Disease Mother     Hypertension Mother     Kidney cancer Father 52        cause of death    No Known Problems Brother     No Known Problems Brother     Cerebral aneurysm Maternal Grandfather 54        cause of death    Hypertension Maternal Grandfather     No Known Problems Child        Medications: reviewed on epic.   Outpatient Medications Marked as Taking for the 12/3/24 encounter (Office Visit) with Minh Merritt D.O.   Medication Sig Dispense Refill    Diclofenac Sodium 1 % Cream Apply 1 Application topically 2 times a day as needed (back pain). 120 g 1    Cholecalciferol (VITAMIN D3) 1.25 MG (58707 UT) Cap       lisinopril (PRINIVIL) 20 MG Tab Take 1 Tablet by mouth every day. 60 Tablet 2    PROAIR  (90 Base) MCG/ACT Aero Soln inhalation aerosol Inhale 1-2 Puffs every four hours as needed for Shortness of Breath. 8.5 g 6    aspirin 81 MG EC tablet Take 81 mg by mouth every day.          Allergies:   Allergies   Allergen Reactions    Pcn [Penicillins] Anaphylaxis       Social Hx:   Social History     Socioeconomic History    Marital status:      Spouse name: Not on file    Number of children: Not on file    Years of education: Not on file    Highest education level: Not on file   Occupational History    Occupation: Retired   Tobacco Use    Smoking status: Never    Smokeless tobacco: Never   Vaping Use    Vaping status: Never Used   Substance and Sexual Activity    Alcohol use: No    Drug use: No    Sexual activity: Yes     Partners: Female   Other Topics Concern    Not on file "   Social History Narrative    Not on file     Social Drivers of Health     Financial Resource Strain: Not on file   Food Insecurity: Not on file   Transportation Needs: Not on file   Physical Activity: Not on file   Stress: Not on file   Social Connections: Not on file   Intimate Partner Violence: Not on file   Housing Stability: Not on file         EXAMINATION   Vitals: There were no vitals taken for this visit.  Physical Exam:     Body Habitus: There is no height or weight on file to calculate BMI.  Appearance: Well-groomed, well-nourished, not disheveled  Eyes: No scleral icterus to suggest severe liver disease, no proptosis to suggest severe hyperthyroid  ENT -no obvious auditory deficits, no external lesions, moist mucus membranes   Skin -no rashes or lesions noted. No appreciable skin breakdown on exposed skin areas.    Respiratory-  breathing comfortably on room air, no audible wheezing, full sentences  Cardiovascular- No lower extremity edema noted.   Psychiatric- alert and oriented, calm, comfortable, cooperative     Musculoskeletal and Neuro:  Gait and station - normal gait with reciprocal pattern,  no presence/use of ambulatory device, no arm assistance with sit-to-stand, nonantalgic. no loss of balance during exam.  No change in patient's demeanor with exam.    Grossly normal cranial nerve exam  Coordination grossly intact   Negative for slump test bilaterally  Positive for tenderness to palpation at the right mid axillary in the intercostal space of rib 5-8.  Negative for tenderness to palpation para-midline region in the middle right thoracic levels.    Slump test negative bilaterally  Negative fair test, Patricia test, and thigh thrust test    Key points for the international standards for neurological classification of spinal cord injury (ISNCSCI) to light touch.   Dermatome R L   L2 2 2   L3 2 2   L4 2 2   L5 2 2   S1 2 2     Motor Exam Lower Extremities  ? Myotome R L   Hip flexion L2 5 5   Knee  extension L3 5 5   Ankle dorsiflexion L4 5 5   Toe extension L5 5 5   Ankle plantarflexion S1 5 5     Reflexes  Clonus of the ankle negative bilaterally   ? R L   Patella 2+ 2+   Achilles  2+ 2+     MEDICAL DECISION MAKING    Medical records review: see under HPI section.     DATA    Labs:   Lab Results   Component Value Date/Time    SODIUM 137 10/23/2023 08:03 AM    SODIUM 134 (L) 08/25/2023 02:45 AM    POTASSIUM 3.5 (L) 10/23/2023 08:03 AM    POTASSIUM 3.9 08/25/2023 02:45 AM    CHLORIDE 104 10/23/2023 08:03 AM    CHLORIDE 98 08/25/2023 02:45 AM    CO2 22 10/23/2023 08:03 AM    CO2 25 08/25/2023 02:45 AM    ANION 11 10/23/2023 08:03 AM    ANION 11.0 08/25/2023 02:45 AM    GLUCOSE 93 10/23/2023 08:03 AM    GLUCOSE 99 08/25/2023 02:45 AM    BUN 12 10/23/2023 08:03 AM    BUN 17 08/25/2023 02:45 AM    CREATININE 0.73 (L) 10/23/2023 08:03 AM    CREATININE 1.17 08/25/2023 02:45 AM    CALCIUM 9.4 10/23/2023 08:03 AM    CALCIUM 9.4 08/25/2023 02:45 AM    ASTSGOT 178 (H) 10/23/2023 08:03 AM    ASTSGOT 412 (H) 08/25/2023 02:45 AM    ALTSGPT 249 (H) 10/23/2023 08:03 AM    ALTSGPT 476 (H) 08/25/2023 02:45 AM    TBILIRUBIN 1.6 10/23/2023 08:03 AM    TBILIRUBIN 1.0 08/25/2023 02:45 AM    ALBUMIN 4.3 10/23/2023 08:03 AM    ALBUMIN 4.1 08/25/2023 02:45 AM    TOTPROTEIN 7.4 10/23/2023 08:03 AM    TOTPROTEIN 6.9 08/25/2023 02:45 AM    GLOBULIN 3.1 10/23/2023 08:03 AM    GLOBULIN 2.8 08/25/2023 02:45 AM    AGRATIO 1.4 10/23/2023 08:03 AM    AGRATIO 1.5 08/25/2023 02:45 AM   ]    Lab Results   Component Value Date/Time    PROTHROMBTM 13.5 02/24/2023 01:17 PM    INR 1.04 02/24/2023 01:17 PM        Lab Results   Component Value Date/Time    WBC 5.9 08/25/2023 02:45 AM    RBC 5.05 06/03/2024 08:40 AM    RBC 3.64 (L) 08/25/2023 02:45 AM    HEMOGLOBIN 16.9 (H) 06/03/2024 08:40 AM    HEMOGLOBIN 12.3 (L) 08/25/2023 02:45 AM    HEMATOCRIT 48.6 06/03/2024 08:40 AM    HEMATOCRIT 35.3 (L) 08/25/2023 02:45 AM    MCV 96.2 06/03/2024 08:40 AM     MCV 97.0 08/25/2023 02:45 AM    MCH 33.5 06/03/2024 08:40 AM    MCH 33.8 (H) 08/25/2023 02:45 AM    MCHC 34.8 06/03/2024 08:40 AM    MCHC 34.8 08/25/2023 02:45 AM    MPV 7.2 06/03/2024 08:40 AM    MPV 8.5 (L) 08/25/2023 02:45 AM    NEUTSPOLYS 61.8 06/03/2024 08:40 AM    NEUTSPOLYS 69.20 08/24/2023 05:30 AM    LYMPHOCYTES 25.6 06/03/2024 08:40 AM    LYMPHOCYTES 15.30 (L) 08/24/2023 05:30 AM    MONOCYTES 11.4 06/03/2024 08:40 AM    MONOCYTES 10.90 08/24/2023 05:30 AM    EOSINOPHILS 0.5 06/03/2024 08:40 AM    EOSINOPHILS 3.30 08/24/2023 05:30 AM    BASOPHILS 0.7 06/03/2024 08:40 AM    BASOPHILS 0.80 08/24/2023 05:30 AM        Lab Results   Component Value Date/Time    HBA1C 5.6 06/03/2024 08:40 AM    HBA1C 5.4 01/06/2023 07:55 AM        Imaging:   I personally reviewed following images, these are my reads stomach;  Thoracic MRI 8/16/2023: Degenerative changes, no high-grade central canal stenosis or neuroforaminal narrowing facet arthropathy    IMAGING radiology reads. I reviewed the following radiology reads     DX-SPINE-ENTIRE L/T-3 VIEWS  Order: [id number]  Impression      1.  Mild osteoarthrosis of the thoracic spine.    Electronically Signed by: Brea Bautista MD 9/6/2024 9:03 PM  Narrative    EXAMINATION:  XR THORACIC SPINE 3 VIEWS    REASON FOR EXAM: pain in thoracic spine    COMPARISON:  No relevant studies available for comparison.    COMMENTS:  Alignment: No spondylolisthesis.  Compression fracture: No compression fracture identified.  Degenerative changes: Mild multilevel disc height loss and endplate degenerative  changes. Mild facet arthropathy.  Other: No erosive or destructive changes.                     Results for orders placed during the hospital encounter of 08/23/23    MR-CERVICAL SPINE-WITH & W/O    Impression  1.  Normal MRI scan of the cervical cord and spine with and without gadolinium enhancement. No evidence of epidural abscess.    Results for orders placed during the hospital encounter of  23    MR-LUMBAR SPINE-W/O    Impression  Unremarkable pre and postcontrast MR examination of the lumbar spine.   Results for orders placed during the hospital encounter of 23    MR-LUMBAR SPINE-WITH    Impression  MRI with contrast does not demonstrate any abnormal enhancement in the lumbar spine.     Results for orders placed during the hospital encounter of 23    MR-THORACIC SPINE-W/O    Impression  1.  Thoracic epidural lipomatosis.  2.  Mild chronic compression deformities at T8 and T9 with mild kyphosis.  3.  Mild degenerative disease. There is mild central canal stenosis at T7-8 secondary to disc bulge and epidural lipomatosis.   Results for orders placed in visit on 24    MR-THORACIC SPINE-WITH   Results for orders placed during the hospital encounter of 23    MR-LUMBAR SPINE-W/O    Impression  Unremarkable pre and postcontrast MR examination of the lumbar spine.                                       Results for orders placed in visit on 23    DX-CERVICAL SPINE-2 OR 3 VIEWS    Impression  Normal cervical spine.     Results for orders placed during the hospital encounter of 23    DX-CHEST-2 VIEWS    Impression  No airspace consolidation.                      Results for orders placed in visit on 23    DX-LUMBAR SPINE-2 OR 3 VIEWS    Impression  No compression deformity or acute fracture is identified.  DEGENERATIVE DISC DISEASE AND FACET ARTHROPATHY ARE PRESENT AT L5-S1.                           ASSESSMENT AND PLAN:  Zuhair Jj   : 1970   Past medical history of BMI 34, steatosis of liver, reactive airway disease, PVCs, otitis media, MAGDALENA, history of cardiac radiofrequency ablation    Diagnoses and all orders for this visit:  1. Myalgia        2. Exercise counseling        3. Obesity (BMI 30-39.9)        4. Spondylosis        5. Facet arthropathy, thoracic            Assessment & Plan  Intercostal muscle strain in the right mid-axillary region.  The  pain is likely due to a strain in the intercostal muscles, which are easily irritated and can cause referred pain. The superficial nature of this area makes topical medications effective.  Liver function appears normal. He is advised to continue using lidocaine spray, which has been effective in managing his pain. Ice application is recommended for additional relief. Over-the-counter Tylenol 325 mg, up to 10 tablets per day as needed, or ibuprofen can be used for pain management during his trip. He is instructed to follow the prescription guidelines on the medication bottle. If the pain becomes unbearable, he may combine Tylenol with either meloxicam or ibuprofen. A set of home stretches will be provided for him to perform while on his trip. A rib x-ray will be ordered to rule out any underlying issues if the pain persists or worsens upon his return. If the pain continues, injections may be considered.  Informed patient an x-ray has been ordered and to consider if pain progresses or does not improve by the time he returns from his trip.    -Dorsalgia/myalgia: No reproducible tenderness to palpation status post trigger point injection in October 2024.  Continue conservative management plan with lidocaine spray, ice, and/or over-the-counter medications as needed  -exercising counseling discussed.  Extensive discussion regarding treatment options, at this time recommending the following:    No orders of the defined types were placed in this encounter.      -Medications/Modalities:   No changes in medications, You may also try ice packs or heating pads to help with pain for no more than 15 minutes at a time several times a day and monitor the skin for changes, and Use of topical agents (diclofenac, Lidoderm, lidocaine).   -Therapy (PT/OT/Aquatherapy): Provided home exercise program for intercostal pain.  Will discuss at next appointment    -Home exercise program: encouraged and provided home exercises of regular  strengthening and stretching at the last visit  -Diagnostic workup: reviewed today as above;  pending thoracic mri and rib x-ray  -Interventional program: will consider at a later time  -Referrals: none required at this time  -Outside records requested: The patient signed Outside Records Request Form for his outside records including images. This includes the records from [facility]    Follow-up: Follow-up in 12 weeks or may return sooner for intercostal rib injection    patient expressed understanding of the management plan. Patient (and Family Members) was/were encouraged to call if any worries, issues, problems or concerns prior to the next visit     Please note that this dictation was created using voice recognition software. I have made every reasonable attempt to correct obvious errors but there may be errors of grammar and content that I may have overlooked prior to finalization of this note.    My total time spent caring for the patient on the day of the encounter was 31 minutes.   This does not include time spent on separately billable procedures/tests.        Minh Merritt DO  Physical Medicine and Rehabilitation  [facility]         URI Darnell Aurosis, D.O.

## 2024-12-28 DIAGNOSIS — I10 ESSENTIAL HYPERTENSION: ICD-10-CM

## 2024-12-30 NOTE — TELEPHONE ENCOUNTER
Received request via: Pharmacy    Was the patient seen in the last year in this department? Yes    Does the patient have an active prescription (recently filled or refills available) for medication(s) requested? No    [facility]     Does the patient have alf Plus and need 100-day supply? (This applies to ALL medications) Patient does not have SCP

## 2024-12-31 RX ORDER — LISINOPRIL 20 MG/1
20 TABLET ORAL DAILY
Qty: 90 TABLET | Refills: 1 | Status: SHIPPED | OUTPATIENT
Start: 2024-12-31

## 2025-01-09 ENCOUNTER — HOSPITAL ENCOUNTER (OUTPATIENT)
Dept: RADIOLOGY | Facility: MEDICAL CENTER | Age: 55
End: 2025-01-09
Attending: STUDENT IN AN ORGANIZED HEALTH CARE EDUCATION/TRAINING PROGRAM
Payer: COMMERCIAL

## 2025-01-09 ENCOUNTER — APPOINTMENT (OUTPATIENT)
Dept: RADIOLOGY | Facility: MEDICAL CENTER | Age: 55
End: 2025-01-09
Attending: GENERAL PRACTICE
Payer: COMMERCIAL

## 2025-01-09 DIAGNOSIS — M79.10 MYALGIA: ICD-10-CM

## 2025-01-09 DIAGNOSIS — M54.6 ACUTE MIDLINE THORACIC BACK PAIN: ICD-10-CM

## 2025-01-09 PROCEDURE — 72070 X-RAY EXAM THORAC SPINE 2VWS: CPT

## 2025-01-09 PROCEDURE — 71100 X-RAY EXAM RIBS UNI 2 VIEWS: CPT | Mod: RT

## 2025-01-15 NOTE — RESULT ENCOUNTER NOTE
Dear Zuhair Jj    I reviewed the results of your test.  There are no urgent findings that require urgent intervention.  We will discuss the results in detail at the follow-up visit.    Minh Merritt DO

## 2025-07-14 DIAGNOSIS — I10 ESSENTIAL HYPERTENSION: ICD-10-CM

## 2025-07-14 NOTE — TELEPHONE ENCOUNTER
Received request via: Patient    Was the patient seen in the last year in this department? Yes    Does the patient have an active prescription (recently filled or refills available) for medication(s) requested? No    Pharm[facility] Name: [email or web address]    Does the patient have long term Plus and need 100-day supply? (This applies to ALL medications) Patient does not have SCP

## 2025-07-16 RX ORDER — LISINOPRIL 20 MG/1
20 TABLET ORAL DAILY
Qty: 30 TABLET | Refills: 1 | Status: SHIPPED | OUTPATIENT
Start: 2025-07-16

## 2025-08-05 ENCOUNTER — OFFICE VISIT (OUTPATIENT)
Dept: MEDICAL GROUP | Facility: PHYSICIAN GROUP | Age: 55
End: 2025-08-05
Payer: COMMERCIAL

## 2025-08-05 VITALS
DIASTOLIC BLOOD PRESSURE: 76 MMHG | HEART RATE: 97 BPM | WEIGHT: 227.96 LBS | HEIGHT: 66 IN | TEMPERATURE: 97.6 F | BODY MASS INDEX: 36.64 KG/M2 | OXYGEN SATURATION: 94 % | RESPIRATION RATE: 12 BRPM | SYSTOLIC BLOOD PRESSURE: 142 MMHG

## 2025-08-05 DIAGNOSIS — E53.8 VITAMIN B12 DEFICIENCY: ICD-10-CM

## 2025-08-05 DIAGNOSIS — K76.0 STEATOSIS OF LIVER: ICD-10-CM

## 2025-08-05 DIAGNOSIS — E11.8 TYPE 2 DIABETES MELLITUS WITH COMPLICATION, WITHOUT LONG-TERM CURRENT USE OF INSULIN (HCC): Primary | ICD-10-CM

## 2025-08-05 DIAGNOSIS — M79.671 PAIN OF RIGHT HEEL: ICD-10-CM

## 2025-08-05 DIAGNOSIS — Z12.5 SCREENING PSA (PROSTATE SPECIFIC ANTIGEN): ICD-10-CM

## 2025-08-05 DIAGNOSIS — E55.9 VITAMIN D DEFICIENCY: ICD-10-CM

## 2025-08-05 PROCEDURE — 3077F SYST BP >= 140 MM HG: CPT | Performed by: STUDENT IN AN ORGANIZED HEALTH CARE EDUCATION/TRAINING PROGRAM

## 2025-08-05 PROCEDURE — 99213 OFFICE O/P EST LOW 20 MIN: CPT | Performed by: STUDENT IN AN ORGANIZED HEALTH CARE EDUCATION/TRAINING PROGRAM

## 2025-08-05 PROCEDURE — 3078F DIAST BP <80 MM HG: CPT | Performed by: STUDENT IN AN ORGANIZED HEALTH CARE EDUCATION/TRAINING PROGRAM

## 2025-08-05 RX ORDER — CELECOXIB 100 MG/1
100 CAPSULE ORAL 2 TIMES DAILY PRN
Qty: 60 CAPSULE | Refills: 0 | Status: SHIPPED | OUTPATIENT
Start: 2025-08-05

## 2025-08-05 ASSESSMENT — FIBROSIS 4 INDEX: FIB4 SCORE: 2.81

## 2025-08-05 ASSESSMENT — PATIENT HEALTH QUESTIONNAIRE - PHQ9: CLINICAL INTERPRETATION OF PHQ2 SCORE: 0

## 2025-08-07 ENCOUNTER — APPOINTMENT (OUTPATIENT)
Dept: URBAN - METROPOLITAN AREA CLINIC 31 | Facility: CLINIC | Age: 55
Setting detail: DERMATOLOGY
End: 2025-08-07

## 2025-08-07 DIAGNOSIS — D22 MELANOCYTIC NEVI: ICD-10-CM

## 2025-08-07 DIAGNOSIS — L82.1 OTHER SEBORRHEIC KERATOSIS: ICD-10-CM

## 2025-08-07 DIAGNOSIS — L57.0 ACTINIC KERATOSIS: ICD-10-CM

## 2025-08-07 DIAGNOSIS — L81.4 OTHER MELANIN HYPERPIGMENTATION: ICD-10-CM

## 2025-08-07 DIAGNOSIS — D18.0 HEMANGIOMA: ICD-10-CM

## 2025-08-07 DIAGNOSIS — Z71.89 OTHER SPECIFIED COUNSELING: ICD-10-CM

## 2025-08-07 PROBLEM — D22.71 MELANOCYTIC NEVI OF RIGHT LOWER LIMB, INCLUDING HIP: Status: ACTIVE | Noted: 2025-08-07

## 2025-08-07 PROBLEM — D23.71 OTHER BENIGN NEOPLASM OF SKIN OF RIGHT LOWER LIMB, INCLUDING HIP: Status: ACTIVE | Noted: 2025-08-07

## 2025-08-07 PROBLEM — D22.72 MELANOCYTIC NEVI OF LEFT LOWER LIMB, INCLUDING HIP: Status: ACTIVE | Noted: 2025-08-07

## 2025-08-07 PROBLEM — D22.62 MELANOCYTIC NEVI OF LEFT UPPER LIMB, INCLUDING SHOULDER: Status: ACTIVE | Noted: 2025-08-07

## 2025-08-07 PROBLEM — D22.5 MELANOCYTIC NEVI OF TRUNK: Status: ACTIVE | Noted: 2025-08-07

## 2025-08-07 PROBLEM — D18.01 HEMANGIOMA OF SKIN AND SUBCUTANEOUS TISSUE: Status: ACTIVE | Noted: 2025-08-07

## 2025-08-07 PROBLEM — D22.61 MELANOCYTIC NEVI OF RIGHT UPPER LIMB, INCLUDING SHOULDER: Status: ACTIVE | Noted: 2025-08-07

## 2025-08-07 PROCEDURE — ? LIQUID NITROGEN

## 2025-08-07 PROCEDURE — ? COUNSELING

## 2025-08-07 ASSESSMENT — LOCATION DETAILED DESCRIPTION DERM
LOCATION DETAILED: PERIUMBILICAL SKIN
LOCATION DETAILED: LEFT PROXIMAL CALF
LOCATION DETAILED: RIGHT PROXIMAL DORSAL FOREARM
LOCATION DETAILED: RIGHT ANTERIOR DISTAL UPPER ARM
LOCATION DETAILED: EPIGASTRIC SKIN
LOCATION DETAILED: RIGHT POSTERIOR SHOULDER
LOCATION DETAILED: LEFT VENTRAL DISTAL FOREARM
LOCATION DETAILED: INFERIOR THORACIC SPINE
LOCATION DETAILED: RIGHT ULNAR DORSAL HAND
LOCATION DETAILED: RIGHT PROXIMAL CALF
LOCATION DETAILED: LEFT LATERAL MALAR CHEEK
LOCATION DETAILED: RIGHT ANTERIOR DISTAL THIGH
LOCATION DETAILED: RIGHT CENTRAL MALAR CHEEK
LOCATION DETAILED: RIGHT MEDIAL FRONTAL SCALP
LOCATION DETAILED: LEFT PROXIMAL DORSAL FOREARM
LOCATION DETAILED: LEFT ANTERIOR DISTAL UPPER ARM
LOCATION DETAILED: LEFT POSTERIOR SHOULDER
LOCATION DETAILED: LEFT PROXIMAL POSTERIOR UPPER ARM
LOCATION DETAILED: RIGHT SUPERIOR MEDIAL MIDBACK
LOCATION DETAILED: LEFT RADIAL DORSAL HAND
LOCATION DETAILED: RIGHT INFERIOR MEDIAL UPPER BACK
LOCATION DETAILED: RIGHT VENTRAL DISTAL FOREARM
LOCATION DETAILED: RIGHT PROXIMAL POSTERIOR UPPER ARM
LOCATION DETAILED: LEFT ANTERIOR DISTAL THIGH

## 2025-08-07 ASSESSMENT — LOCATION SIMPLE DESCRIPTION DERM
LOCATION SIMPLE: RIGHT CHEEK
LOCATION SIMPLE: LEFT CALF
LOCATION SIMPLE: LEFT CHEEK
LOCATION SIMPLE: RIGHT UPPER ARM
LOCATION SIMPLE: RIGHT HAND
LOCATION SIMPLE: RIGHT SHOULDER
LOCATION SIMPLE: LEFT UPPER ARM
LOCATION SIMPLE: RIGHT LOWER BACK
LOCATION SIMPLE: LEFT FOREARM
LOCATION SIMPLE: UPPER BACK
LOCATION SIMPLE: ABDOMEN
LOCATION SIMPLE: LEFT HAND
LOCATION SIMPLE: RIGHT FOREARM
LOCATION SIMPLE: LEFT SHOULDER
LOCATION SIMPLE: LEFT THIGH
LOCATION SIMPLE: RIGHT CALF
LOCATION SIMPLE: RIGHT THIGH
LOCATION SIMPLE: RIGHT SCALP
LOCATION SIMPLE: RIGHT UPPER BACK

## 2025-08-07 ASSESSMENT — LOCATION ZONE DERM
LOCATION ZONE: ARM
LOCATION ZONE: FACE
LOCATION ZONE: SCALP
LOCATION ZONE: HAND
LOCATION ZONE: TRUNK
LOCATION ZONE: LEG

## 2025-08-19 ENCOUNTER — TELEPHONE (OUTPATIENT)
Dept: MEDICAL GROUP | Facility: PHYSICIAN GROUP | Age: 55
End: 2025-08-19
Payer: COMMERCIAL